# Patient Record
Sex: FEMALE | Race: WHITE | Employment: OTHER | URBAN - METROPOLITAN AREA
[De-identification: names, ages, dates, MRNs, and addresses within clinical notes are randomized per-mention and may not be internally consistent; named-entity substitution may affect disease eponyms.]

---

## 2017-01-03 ENCOUNTER — HOSPITAL ENCOUNTER (OUTPATIENT)
Dept: PHYSICAL THERAPY | Age: 57
Discharge: HOME | End: 2017-01-03
Payer: COMMERCIAL

## 2017-01-03 PROCEDURE — 97110 THERAPEUTIC EXERCISES: CPT

## 2017-01-03 PROCEDURE — 97033 APP MDLTY 1+IONTPHRSIS EA 15: CPT

## 2017-01-03 NOTE — PROGRESS NOTES
PT DAILY TREATMENT NOTE - [facility] 316    Patient Name: Kaye Market  Date:1/3/2017  : 1960  [x]  Patient  Verified  Payor: Addie Salinas / Plan: [address] PPO / Product Type: PPO /    In time: 9:34 Out time:10:18  Total Treatment Time (min): 38  Visit #: 5 of     Treatment Area: Pain in right hip [M25.551]  Pain in left hip [M25.552]    SUBJECTIVE  Pain Level (0-10 scale): 5  Any medication changes, allergies to medications, adverse drug reactions, diagnosis change, or new procedure performed?: [x] No    [] Yes (see summary sheet for update)  Subjective functional status/changes:   [] No changes reported  I  Took  [place]  Things down.     OBJECTIVE    Modality rationale: decrease edema, decrease inflammation, decrease pain and increase tissue extensibility to improve the patients ability to perform ADL    Min Type Additional Details    [] Estim:  []Unatt       []IFC  []Premod                        []Other:  []w/ice   []w/heat  Position:  Location:    [] Estim: []Att    []TENS instruct  []NMES                    []Other:  []w/US   []w/ice   []w/heat  Position:  Location:    []  Traction: [] Cervical       []Lumbar                       [] Prone          []Supine                       []Intermittent   []Continuous Lbs:  [] before manual  [] after manual    []  Ultrasound: []Continuous   [] Pulsed                           []1MHz   []3MHz Location:  W/cm2:   10 [x]  Iontophoresis with dexamethasone         Location: [x] Take home patch   [] In clinic    []  Ice     []  heat  []  Ice massage  []  Laser   []  Anodyne Position:  Location:    []  Laser with stim  []  Other: Position:  Location:    []  Vasopneumatic Device Pressure:       [] lo [] med [] hi   Temperature: [] lo [] med [] hi   [x] Skin assessment post-treatment:  [x]intact []redness- no adverse reaction    []redness - adverse reaction:      min []Eval                  []Re-Eval       28 min Therapeutic Exercise:  [x] See flow sheet : Rationale: increase ROM, increase strength and improve coordination to improve the patients ability to perform ADL        min Therapeutic Activity:  []  See flow sheet :   Rationale:   to improve the patients ability to       min Neuromuscular Re-education:  []  See flow sheet :   Rationale:   to improve the patients ability to      min Manual Therapy:     Rationale: decrease pain, increase ROM, increase tissue extensibility and decrease edema  to perform ADL     min Gait Training:  ___ feet with ___ device on level surfaces with ___ level of assist   Rationale: With   [x] TE   [] TA   [] neuro   [] other: Patient Education: [x] Review HEP    [] Progressed/Changed HEP based on:   [] positioning   [] body mechanics   [] transfers   [] heat/ice application    [] other:      Other Objective/Functional Measures:  Mild  Skin  Irritation  On  (L) hip  FOTO:44    Pain Level (0-10 scale) post treatment: 4-5    ASSESSMENT/Changes in Function: discussed  With pt  On  Indication  Of  Irritation  With ionto. Pt  Requested  To try  It  Again. Fair  Response  To each there ex. Instruct  Pt  To ice  At home. FOTO  Remained  The same. Patient will continue to benefit from skilled PT services to address functional mobility deficits, address ROM deficits, address strength deficits and analyze and address soft tissue restrictions to attain remaining goals. [x]  See Plan of Care  []  See progress note/recertification  []  See Discharge Summary         Progress towards goals / Updated goals:  Short Term Goals:  To be accomplished in 5 treatments:  1 patient will have established and be independent with HEP to aid with progression of skilled PT program  EVAL issued  CURRENT progressing 12/23/16  2 patient Will have Pain R 2 L 1 to aid with increase tolerance to ADLs and activities  EVAL R 4 L 2  CURRENT L 4 R 5 12/27/16  3 patient will have FOTO improved to 50 to show improved function And tolerance to ADLs  EVAL 44  CURRENT  44 1/3/16  Long Term Goals:  To be accomplished in 12-24 treatments:  1 patient Will have Pain R 1 L 2 to aid with increase tolerance to ADLs and activities  EVAL R 4 L 2  CURRENT L 4 R 5 12/27/16  2 patient will have FOTO improved to 59 to show improved function And tolerance to ADLs  EVAL 44  CURRENT  3 patient will report 50% improvement to aid with carryover to resuming her walking program  EVAL UA to tolerate her walking program  CURRENT    PLAN  []  Upgrade activities as tolerated     [x]  Continue plan of care  []  Update interventions per flow sheet       []  Discharge due to:_  []  Other:_      Maria L Carroll, SANDRA 1/3/2017  9:53 AM

## 2017-01-05 ENCOUNTER — HOSPITAL ENCOUNTER (OUTPATIENT)
Dept: PHYSICAL THERAPY | Age: 57
Discharge: HOME | End: 2017-01-05
Payer: COMMERCIAL

## 2017-01-05 PROCEDURE — 97035 APP MDLTY 1+ULTRASOUND EA 15: CPT

## 2017-01-05 PROCEDURE — 97110 THERAPEUTIC EXERCISES: CPT

## 2017-01-05 NOTE — PROGRESS NOTES
PT DAILY TREATMENT NOTE - [facility] 3-16    Patient Name: Pasha Elder  Date:2017  : 1960  [x]  Patient  Verified  Payor: Miguel Angel Cruz / Plan: Roy Ormond PPO / Product Type: PPO /    In time:4:25  Out time:5:14  Total Treatment Time (min): 41  Visit #: 6 of     Treatment Area: Pain in right hip [M25.551]  Pain in left hip [M25.552]    SUBJECTIVE  Pain Level (0-10 scale): 3  (L)  4  (R)  Any medication changes, allergies to medications, adverse drug reactions, diagnosis change, or new procedure performed?: [x] No    [] Yes (see summary sheet for update)  Subjective functional status/changes:   [] No changes reported  Went  Back to work on  [employer]  Part time.     OBJECTIVE    Modality rationale: decrease edema, decrease inflammation, decrease pain and increase tissue extensibility to improve the patients ability to perform ADL   Min Type Additional Details    [] Estim:  []Unatt       []IFC  []Premod                        []Other:  []w/ice   []w/heat  Position:  Location:    [] Estim: []Att    []TENS instruct  []NMES                    []Other:  []w/US   []w/ice   []w/heat  Position:  Location:    []  Traction: [] Cervical       []Lumbar                       [] Prone          []Supine                       []Intermittent   []Continuous Lbs:  [] before manual  [] after manual    [x]  Ultrasound: [x]Continuous   [] Pulsed             10              [x]1MHz   []3MHz Location:(B) hip  W/cm2:1.3    []  Iontophoresis with dexamethasone         Location: [] Take home patch   [] In clinic    []  Ice     []  heat  []  Ice massage  []  Laser   []  Anodyne Position:  Location:    []  Laser with stim  []  Other: Position:  Location:    []  Vasopneumatic Device Pressure:       [] lo [] med [] hi   Temperature: [] lo [] med [] hi   [x] Skin assessment post-treatment:  [x]intact []redness- no adverse reaction    []redness - adverse reaction:      min []Eval                  []Re-Eval       31 min Therapeutic Exercise:  [x] See flow sheet :   Rationale: increase ROM and increase strength to improve the patients ability to perform ADL     min Therapeutic Activity:  []  See flow sheet :   Rationale:   to improve the patients ability to      min Neuromuscular Re-education:  []  See flow sheet :   Rationale:   to improve the patients ability to      min Manual Therapy:     Rationale: decrease pain, increase ROM, increase tissue extensibility and decrease edema  to perform ADL     min Gait Training:  ___ feet with ___ device on level surfaces with ___ level of assist   Rationale: With   [x] TE   [] TA   [] neuro   [] other: Patient Education: [x] Review HEP    [] Progressed/Changed HEP based on:   [] positioning   [] body mechanics   [] transfers   [] heat/ice application    [] other:      Other Objective/Functional Measures:  Increased  Rep  Per flow  sheet    Pain Level (0-10 scale) post treatment: 0    ASSESSMENT/Changes in Function: Completed  Each there ex  Fairly  Well. Benefited  With US. Patient will continue to benefit from skilled PT services to address functional mobility deficits, address ROM deficits, address strength deficits, analyze and address soft tissue restrictions, analyze and cue movement patterns and instruct in home and community integration to attain remaining goals. [x]  See Plan of Care  []  See progress note/recertification  []  See Discharge Summary         Progress towards goals / Updated goals:  Short Term Goals: To be accomplished in 5 treatments:  1 patient will have established and be independent with HEP to aid with progression of skilled PT program  EVAL issued  CURRENT progressing 12/23/16  2 patient Will have Pain R 2 L 1 to aid with increase tolerance to ADLs and activities  EVAL R 4 L 2  CURRENT L 4 R 5 12/27/16  3 patient will have FOTO improved to 50 to show improved function And tolerance to ADLs  EVAL 44  CURRENT 44 1/3/16  Long Term Goals:  To be accomplished in 12-24 treatments:  1 patient Will have Pain R 1 L 2 to aid with increase tolerance to ADLs and activities  EVAL R 4 L 2  CURRENT L 4 R 5 12/27/16  2 patient will have FOTO improved to 59 to show improved function And tolerance to ADLs  EVAL 44  CURRENT  3 patient will report 50% improvement to aid with carryover to resuming her walking program  EVAL UA to tolerate her walking program  CURRENT  PLAN  []  Upgrade activities as tolerated     [x]  Continue plan of care  []  Update interventions per flow sheet       []  Discharge due to:_  []  Other:_      Maria L Carroll, SANDRA 1/5/2017  4:34 PM

## 2017-01-09 ENCOUNTER — APPOINTMENT (OUTPATIENT)
Dept: PHYSICAL THERAPY | Age: 57
End: 2017-01-09
Payer: COMMERCIAL

## 2017-01-10 ENCOUNTER — APPOINTMENT (OUTPATIENT)
Dept: PHYSICAL THERAPY | Age: 57
End: 2017-01-10
Payer: COMMERCIAL

## 2017-01-11 ENCOUNTER — HOSPITAL ENCOUNTER (OUTPATIENT)
Dept: PHYSICAL THERAPY | Age: 57
Discharge: HOME | End: 2017-01-11
Payer: COMMERCIAL

## 2017-01-11 PROCEDURE — 97035 APP MDLTY 1+ULTRASOUND EA 15: CPT

## 2017-01-11 PROCEDURE — 97110 THERAPEUTIC EXERCISES: CPT

## 2017-01-11 NOTE — PROGRESS NOTES
[facility]  [address] [facility] [facility], [address] [address],[place]  [phone] fax    Physician Update  [x] Progress Note  [] Discharge Summary  Patient name: Jazmine Clark Start of Care: 16   Referral source: Aneesh Tillman MD : 1960   Medical/Treatment Diagnosis: Pain in right hip [M25.551]  Pain in left hip [M25.552] Onset Date:May 2016     Prior Hospitalization: see medical history Provider#: [id number]   Medications: Verified on Patient Summary List    Comorbidities:fibromyalgia, depression ,arthritis, thyroid nodules, HTN, similar hip involvement 10 years ago  Prior Level of Function:I all areas of ADLs and acitivities, no AD use today  Visits from Start of Care: 7    Missed Visits: 0    Status at Evaluation/Last Progress Note: initial evaluation and plan of care  Progress towards Goals: Pain B hips 4/10 today (eval R 4 L 2 ), FOTO has remained at 44. She has been compliant and cooperative with her exercises and HEP. She initially was noting benefit from the iontophoresis intervention however developed an irritation to the patch and we discontinued this. We initiated US and she has been noting benefit from this recent change. We are awaiting a prescription to be called in to her pharmacy for the 10% hydrocortisone cream to initiate phonophoresis. Patient demonstrates the potential to make further gains with improving ROM, strength, endurance/activity tolerance, functional FOTO survey score and all within a reasonable time frame so as to further increase their functional independence with ADLs and activities for carryover to  improved sleeping tolerance , tolerance to household chores. Patient requires skilled Physical Therapy so as to monitor their response to and modify their treatment plan accordingly. Patient appears to be an appropriate candidate for skilled outpatient Physical Therapy.     Goals: to be achieved in 5-7 treatments:    ASSESSMENT/RECOMMENDATIONS:  [x]Continue therapy per initial plan/protocol at a frequency of  2-3 x per week for 5-7 treatments from original plan  []Continue therapy with the following recommended changes:_____________________      _____________________________________________________________________  []Discontinue therapy progressing towards or have reached established goals  []Discontinue therapy due to lack of appreciable progress towards goals  []Discontinue therapy due to lack of attendance or compliance  []Await Physician's recommendations/decisions regarding therapy  []Other:________________________________________________________________    Thank you for this referral. Marisabel Campa, PT 1/11/2017 11:11 AM  NOTE TO PHYSICIAN:  PLEASE COMPLETE THE ORDERS BELOW AND   [facility]: [phone]  If you are unable to process this request in 24 hours please contact our office: [phone]    ? I have read the above report and request that my patient continue as recommended. ? I have read the above report and request that my patient continue therapy with the following changes/special instructions:_____________________________________  ? I have read the above report and request that my patient be discharged from therapy.     Physicians signature: __________________________Date: ________Time:________

## 2017-01-11 NOTE — PROGRESS NOTES
PT DAILY TREATMENT NOTE     Patient Name: Vincent Stringer  Date:2017  : 1960  [x]  Patient  Verified  Payor: Alex Fisher / Plan: Elisa Pabon PPO / Product Type: PPO /    In time:1002  Out time:1103  Total Treatment Time (min): 47  Visit #: 7 of     Treatment Area: Pain in right hip [M25.551]  Pain in left hip [M25.552]    SUBJECTIVE  Pain Level (0-10 scale): B 4  Any medication changes, allergies to medications, adverse drug reactions, diagnosis change, or new procedure performed?: [x] No    [] Yes (see summary sheet for update)  Subjective functional status/changes:   [] No changes reported  I really noticed a difference from the US she did.  I am still trying to get the medicated cream.    OBJECTIVE    Modality rationale: decrease edema, decrease inflammation, decrease pain and increase tissue extensibility to improve the patients ability to aid with increase tolerance to ADLs and activities   Min Type Additional Details    [] Estim:  []Unatt       []IFC  []Premod                        []Other:  []w/ice   []w/heat  Position:  Location:    [] Estim: []Att    []TENS instruct  []NMES                    []Other:  []w/US   []w/ice   []w/heat  Position:  Location:    []  Traction: [] Cervical       []Lumbar                       [] Prone          []Supine                       []Intermittent   []Continuous Lbs:  [] before manual  [] after manual   16 [x]  Ultrasound: [x]Continuous   [] Pulsed                           [x]1MHz   []3MHz W/cm2:1.3  Location:B hips, greater trochanteric regions    []  Iontophoresis with dexamethasone         Location: [] Take home patch   [] In clinic    []  Ice     []  heat  []  Ice massage  []  Laser   []  Anodyne Position:  Location:    []  Laser with stim  []  Other:  Position:  Location:    []  Vasopneumatic Device Pressure:       [] lo [] med [] hi   Temperature: [] lo [] med [] hi   [] Skin assessment post-treatment:  []intact []redness- no adverse reaction []redness - adverse reaction:      min []Eval                  []Re-Eval       31 min Therapeutic Exercise:  [x] See flow sheet :   Rationale: increase ROM, increase strength, improve coordination, improve balance and increase proprioception to improve the patients ability to aid with increase tolerance to ADLs and activities     min Therapeutic Activity:  []  See flow sheet :   Rationale:   to improve the patients ability to       min Neuromuscular Re-education:  []  See flow sheet :   Rationale:   to improve the patients ability to      min Manual Therapy:     Rationale:  to      min Gait Training:  ___ feet with ___ device on level surfaces with ___ level of assist   Rationale: With   [] TE   [] TA   [] neuro   [] other: Patient Education: [x] Review HEP    [] Progressed/Changed HEP based on:   [] positioning   [] body mechanics   [] transfers   [] heat/ice application    [] other:      Other Objective/Functional Measures:VC exercises and stretches    Pain Level (0-10 scale) post treatment: 4    ASSESSMENT/Changes in Function: patient notes benefit from [address] last session to B hips. Patient will continue to benefit from skilled PT services to modify and progress therapeutic interventions, address functional mobility deficits, address ROM deficits, address strength deficits, analyze and address soft tissue restrictions, analyze and cue movement patterns, analyze and modify body mechanics/ergonomics, assess and modify postural abnormalities and instruct in home and community integration to attain remaining goals. [x]  See Plan of Care  []  See progress note/recertification  []  See Discharge Summary         Progress towards goals / Updated goals:  Short Term Goals:  To be accomplished in 5 treatments:  1 patient will have established and be independent with HEP to aid with progression of skilled PT program  EVAL issued  CURRENT progressing 12/23/16, met 1/11/17  2 patient Will have Pain R 2 L 1 to aid with increase tolerance to ADLs and activities  EVAL R 4 L 2  CURRENT B hips 4/10 1/11/17  3 patient will have FOTO improved to 50 to show improved function And tolerance to ADLs  EVAL 44  CURRENT 44 1/3/16  Long Term Goals:  To be accomplished in 12-24 treatments:  1 patient Will have Pain R 1 L 2 to aid with increase tolerance to ADLs and activities  EVAL R 4 L 2  CURRENT B hips 4 1/11/17  2 patient will have FOTO improved to 59 to show improved function And tolerance to ADLs  EVAL 44  CURRENT 44   1/3/17  3 patient will report 50% improvement to aid with carryover to resuming her walking program  EVAL UA to tolerate her walking program  CURRENT    PLAN  [x]  Upgrade activities as tolerated     [x]  Continue plan of care  []  Update interventions per flow sheet       []  Discharge due to:_  []  Other:_      Jeronimo Henry, PT 1/11/2017  10:09 AM    Future Appointments  Date Time Provider Lian Bustamante   1/11/2017 10:30 AM Jeronimo Henry PT [facility]   1/17/2017 9:30 AM Wiliam Shore PTA [facility]   1/19/2017 4:30 PM Maria L Carroll PTA [facility]   10/16/2017 8:30 AM Jordana Valencia MD [facility]

## 2017-01-12 ENCOUNTER — APPOINTMENT (OUTPATIENT)
Dept: PHYSICAL THERAPY | Age: 57
End: 2017-01-12
Payer: COMMERCIAL

## 2017-01-17 ENCOUNTER — HOSPITAL ENCOUNTER (OUTPATIENT)
Dept: PHYSICAL THERAPY | Age: 57
Discharge: HOME | End: 2017-01-17
Payer: COMMERCIAL

## 2017-01-17 PROCEDURE — 97035 APP MDLTY 1+ULTRASOUND EA 15: CPT

## 2017-01-17 PROCEDURE — 97110 THERAPEUTIC EXERCISES: CPT

## 2017-01-17 NOTE — PROGRESS NOTES
PT DAILY TREATMENT NOTE - [facility] 3-16    Patient Name: Heydi Mclean  Date:2017  : 1960  [x]  Patient  Verified  Payor: Yessi Life / Plan: DOROTHYJORDAN JOSIANERENATO PPO / Product Type: PPO /    In time:9:28  Out time:10:12  Total Treatment Time (min):33  Visit #: 8 of     Treatment Area: Pain in right hip [M25.551]  Pain in left hip [M25.552]    SUBJECTIVE  Pain Level (0-10 scale): 3  Any medication changes, allergies to medications, adverse drug reactions, diagnosis change, or new procedure performed?: [x] No    [] Yes (see summary sheet for update)  Subjective functional status/changes:   [] No changes reported  It  Ached  A lot  Ion yesterday  Due  To  That I was on  My  Feet  For  ~ 5 hours  Cooking.     OBJECTIVE    Modality rationale: decrease edema, decrease inflammation, decrease pain and increase tissue extensibility to improve the patients ability to perform ADL   Min Type Additional Details    [] Estim:  []Unatt       []IFC  []Premod                        []Other:  []w/ice   []w/heat  Position:  Location:    [] Estim: []Att    []TENS instruct  []NMES                    []Other:  []w/US   []w/ice   []w/heat  Position:  Location:    []  Traction: [] Cervical       []Lumbar                       [] Prone          []Supine                       []Intermittent   []Continuous Lbs:  [] before manual  [] after manual   1 [x]  Ultrasound: [x]Continuous   [] Pulsed       10     [employer]ono               [x]1MHz   []3MHz Location: (B) hip  W/cm2:1.3    []  Iontophoresis with dexamethasone         Location: [] Take home patch   [] In clinic    []  Ice     []  heat  []  Ice massage  []  Laser   []  Anodyne Position:  Location:    []  Laser with stim  []  Other: Position:  Location:    []  Vasopneumatic Device Pressure:       [] lo [] med [] hi   Temperature: [] lo [] med [] hi   [x] Skin assessment post-treatment:  [x]intact []redness- no adverse reaction    []redness - adverse reaction:      min []Eval []Re-Eval       23 min Therapeutic Exercise:  [x] See flow sheet :   Rationale: increase ROM and increase strength to improve the patients ability to perform ADL     min Therapeutic Activity:  []  See flow sheet :   Rationale:   to improve the patients ability to       min Neuromuscular Re-education:  []  See flow sheet :   Rationale:   to improve the patients ability to      min Manual Therapy:     Rationale: decrease pain, increase ROM, increase tissue extensibility and decrease edema  to perform ADL      min Gait Training:  ___ feet with ___ device on level surfaces with ___ level of assist   Rationale: With   [x] TE   [] TA   [] neuro   [] other: Patient Education: [x] Review HEP    [] Progressed/Changed HEP based on:   [] positioning   [] body mechanics   [] transfers   [] heat/ice application    [] other:      Other Objective/Functional Measures:  Increased  Seconds  For  stretches    Pain Level (0-10 scale) post treatment: <3    ASSESSMENT/Changes in Function: Responded  Fairly  Well  To each there ex. Patient will continue to benefit from skilled PT services to address functional mobility deficits, address ROM deficits, address strength deficits, analyze and address soft tissue restrictions, analyze and cue movement patterns and instruct in home and community integration to attain remaining goals. [x]  See Plan of Care  []  See progress note/recertification  []  See Discharge Summary         Progress towards goals / Updated goals:  Short Term Goals:  To be accomplished in 5 treatments:  1 patient will have established and be independent with HEP to aid with progression of skilled PT program  EVAL issued  CURRENT progressing 12/23/16, met 1/11/17  2 patient Will have Pain R 2 L 1 to aid with increase tolerance to ADLs and activities  EVAL R 4 L 2  CURRENT B hips 4/10 1/11/17  3 patient will have FOTO improved to 50 to show improved function And tolerance to ADLs  EVAL 44  CURRENT 44 1/3/16  Long Term Goals:  To be accomplished in 12-24 treatments:  1 patient Will have Pain R 1 L 2 to aid with increase tolerance to ADLs and activities  EVAL R 4 L 2  CURRENT B hips 4 1/11/17  2 patient will have FOTO improved to 59 to show improved function And tolerance to ADLs  EVAL 44  CURRENT 44 1/3/17  3 patient will report 50% improvement to aid with carryover to resuming her walking program  EVAL UA to tolerate her walking program  CURRENT    PLAN  []  Upgrade activities as tolerated     [x]  Continue plan of care  []  Update interventions per flow sheet       []  Discharge due to:_  []  Other:_      Maria L Carroll, SANDRA 1/17/2017  9:28 AM

## 2017-01-19 ENCOUNTER — HOSPITAL ENCOUNTER (OUTPATIENT)
Dept: PHYSICAL THERAPY | Age: 57
Discharge: HOME | End: 2017-01-19
Payer: COMMERCIAL

## 2017-01-19 PROCEDURE — 97110 THERAPEUTIC EXERCISES: CPT

## 2017-01-19 PROCEDURE — 97035 APP MDLTY 1+ULTRASOUND EA 15: CPT

## 2017-01-19 NOTE — PROGRESS NOTES
PT DAILY TREATMENT NOTE - [facility] 3-16    Patient Name: Heydi Mclean  Date:2017  : 1960  [x]  Patient  Verified  Payor: Yessi Life / Plan: [employer] PPO / Product Type: PPO /    In time:4:26  Out time:5:16  Total Treatment Time (min): 45  Visit #: 9 of     Treatment Area: Pain in right hip [M25.551]  Pain in left hip [M25.552]    SUBJECTIVE  Pain Level (0-10 scale): 3  Any medication changes, allergies to medications, adverse drug reactions, diagnosis change, or new procedure performed?: [x] No    [] Yes (see summary sheet for update)  Subjective functional status/changes:   [] No changes reported  Feeling  Better.     OBJECTIVE    Modality rationale: decrease edema, decrease inflammation, decrease pain and increase tissue extensibility to improve the patients ability to perform ADL   Min Type Additional Details    [] Estim:  []Unatt       []IFC  []Premod                        []Other:  []w/ice   []w/heat  Position:  Location:    [] Estim: []Att    []TENS instruct  []NMES                    []Other:  []w/US   []w/ice   []w/heat  Position:  Location:    []  Traction: [] Cervical       []Lumbar                       [] Prone          []Supine                       []Intermittent   []Continuous Lbs:  [] before manual  [] after manual    [x]  Ultrasound: [x]Continuous   [] Pulsed       10        phono            [x]1MHz   []3MHz Location:(B) hip  W/cm2:1.3    []  Iontophoresis with dexamethasone         Location: [] Take home patch   [] In clinic    []  Ice     []  heat  []  Ice massage  []  Laser   []  Anodyne Position:  Location:    []  Laser with stim  []  Other: Position:  Location:    []  Vasopneumatic Device Pressure:       [] lo [] med [] hi   Temperature: [] lo [] med [] hi   [x] Skin assessment post-treatment:  [x]intact []redness- no adverse reaction    []redness - adverse reaction:      min []Eval                  []Re-Eval       35 min Therapeutic Exercise:  [x] See flow sheet : Rationale: increase ROM and increase strength to improve the patients ability to perform ADL     min Therapeutic Activity:  []  See flow sheet :   Rationale:   to improve the patients ability to       min Neuromuscular Re-education:  []  See flow sheet :   Rationale:   to improve the patients ability to      min Manual Therapy:     Rationale:  to      min Gait Training:  ___ feet with ___ device on level surfaces with ___ level of assist   Rationale: With   [x] TE   [] TA   [] neuro   [] other: Patient Education: [x] Review HEP    [] Progressed/Changed HEP based on:   [] positioning   [] body mechanics   [] transfers   [] heat/ice application    [] other:      Other Objective/Functional Measures:  Completed  Each there ex  Fairly  Well. Pain Level (0-10 scale) post treatment: 0    ASSESSMENT/Changes in Function: Benefited  With treatment. Patient will continue to benefit from skilled PT services to address functional mobility deficits, address ROM deficits, address strength deficits, analyze and address soft tissue restrictions and instruct in home and community integration to attain remaining goals. [x]  See Plan of Care  []  See progress note/recertification  []  See Discharge Summary         Progress towards goals / Updated goals:  Short Term Goals: To be accomplished in 5 treatments:  1 patient will have established and be independent with HEP to aid with progression of skilled PT program  EVAL issued  CURRENT progressing 12/23/16, met 1/11/17  2 patient Will have Pain R 2 L 1 to aid with increase tolerance to ADLs and activities  EVAL R 4 L 2  CURRENT B hips 4/10 1/11/17  3 patient will have FOTO improved to 50 to show improved function And tolerance to ADLs  EVAL 44  CURRENT 44 1/3/16  Long Term Goals:  To be accomplished in 12-24 treatments:  1 patient Will have Pain R 1 L 2 to aid with increase tolerance to ADLs and activities  EVAL R 4 L 2  CURRENT B hips 4 1/11/17  2 patient will have FOTO improved to 59 to show improved function And tolerance to ADLs  EVAL 44  CURRENT 44 1/3/17  3 patient will report 50% improvement to aid with carryover to resuming her walking program  EVAL UA to tolerate her walking program  CURRENT       PLAN  []  Upgrade activities as tolerated     [x]  Continue plan of care  []  Update interventions per flow sheet       []  Discharge due to:_  [x]  Other:_ REASSESS GOALS  MD NOTE  BRIAN Carroll, PTA 1/19/2017  4:59 PM

## 2017-01-23 ENCOUNTER — HOSPITAL ENCOUNTER (OUTPATIENT)
Dept: PHYSICAL THERAPY | Age: 57
Discharge: HOME | End: 2017-01-23
Payer: COMMERCIAL

## 2017-01-23 PROCEDURE — 97110 THERAPEUTIC EXERCISES: CPT

## 2017-01-23 PROCEDURE — 97035 APP MDLTY 1+ULTRASOUND EA 15: CPT

## 2017-01-23 NOTE — PROGRESS NOTES
PT DAILY TREATMENT NOTE     Patient Name: Pasha Elder  Date:2017  : 1960  [x]  Patient  Verified  Payor: Miguel Angel Cruz / Plan: Roy Ormond PPO / Product Type: PPO /    In time:10:06  Out time:11:02  Total Treatment Time (min): 64  Visit #: 10 of     Treatment Area: Pain in right hip [M25.551]  Pain in left hip [M25.552]    SUBJECTIVE  Pain Level (0-10 scale): R-3,L-1  Any medication changes, allergies to medications, adverse drug reactions, diagnosis change, or new procedure performed?: [x] No    [] Yes (see summary sheet for update)  Subjective functional status/changes:   [] No changes reported  This will be the 3rd treatment with the phono. OBJECTIVE    Modality rationale: decrease edema and decrease inflammation to improve the patients ability to improve pts ability to sit for long flights as she enjoys travelling.    Min Type Additional Details    [] Estim:  []Unatt       []IFC  []Premod                        []Other:  []w/ice   []w/heat  Position:  Location:    [] Estim: []Att    []TENS instruct  []NMES                    []Other:  []w/US   []w/ice   []w/heat  Position:  Location:    []  Traction: [] Cervical       []Lumbar                       [] Prone          []Supine                       []Intermittent   []Continuous Lbs:  [] before manual  [] after manual   10 [x]  Ultrasound: [x]Continuous   [] Pulsed                           []1MHz   [x]3MHz W/cm2:1.0  Location:B hips    []  Iontophoresis with dexamethasone         Location: [] Take home patch   [] In clinic    []  Ice     []  heat  []  Ice massage  []  Laser   []  Anodyne Position:  Location:    []  Laser with stim  []  Other:  Position:  Location:    []  Vasopneumatic Device Pressure:       [] lo [] med [] hi   Temperature: [] lo [] med [] hi   [x] Skin assessment post-treatment:  [x]intact [x]redness- no adverse reaction    []redness - adverse reaction:       46 min Therapeutic Exercise:  [] See flow sheet :   Rationale: increase ROM and increase strength to improve the patients ability to return to normal daily activities. With   [x] TE   [] TA   [] neuro   [] other: Patient Education: [x] Review HEP    [x] Progressed/Changed HEP based on:   [] positioning   [] body mechanics   [] transfers   [] heat/ice application    [] other:      Other Objective/Functional Measures: Improvement noted during the phono treatment with decreased pain and after treatment pt reports improved ROM. Pain Level (0-10 scale) post treatment: 0/10    ASSESSMENT/Changes in Function: Pt to continue with current POC to achieve current goals. Patient will continue to benefit from skilled PT services to address functional mobility deficits to attain remaining goals. [x]  See Plan of Care  []  See progress note/recertification  []  See Discharge Summary         Progress towards goals / Updated goals:  Short Term Goals: To be accomplished in 5 treatments:  1 patient will have established and be independent with HEP to aid with progression of skilled PT program  EVAL issued  CURRENT progressing 12/23/16, met 1/11/17  2 patient Will have Pain R 2 L 1 to aid with increase tolerance to ADLs and activities  EVAL R 4 L 2  CURRENT B hips 4/10 1/11/17  3 patient will have FOTO improved to 50 to show improved function And tolerance to ADLs  EVAL 44  CURRENT 44 1/3/16  Long Term Goals:  To be accomplished in 12-24 treatments:  1 patient Will have Pain R 1 L 2 to aid with increase tolerance to ADLs and activities  EVAL R 4 L 2  CURRENT B hips 4 1/11/17  2 patient will have FOTO improved to 59 to show improved function And tolerance to ADLs  EVAL 44  CURRENT 44 1/3/17  3 patient will report 50% improvement to aid with carryover to resuming her walking program  EVAL UA to tolerate her walking program  CURRENT    PLAN  [x]  Upgrade activities as tolerated     []  Continue plan of care  []  Update interventions per flow sheet       []  Discharge due to:_  [] Other:_      Nayeli Sharp 1/23/2017  11:44 AM    Future Appointments  Date Time Provider Lian Vegaisti   1/25/2017 10:30 AM Rosalind Edmond [facility]   1/30/2017 9:30 AM Chun Ramos PT [facility]   2/1/2017 4:30 PM Maria L Carroll PTA [facility]   10/16/2017 8:30 AM Robby Okeefe MD [facility]

## 2017-01-25 ENCOUNTER — HOSPITAL ENCOUNTER (OUTPATIENT)
Dept: PHYSICAL THERAPY | Age: 57
Discharge: HOME | End: 2017-01-25
Payer: COMMERCIAL

## 2017-01-25 PROCEDURE — 97035 APP MDLTY 1+ULTRASOUND EA 15: CPT

## 2017-01-25 PROCEDURE — 97110 THERAPEUTIC EXERCISES: CPT

## 2017-01-25 NOTE — PROGRESS NOTES
PT DAILY TREATMENT NOTE - [facility] 3-16    Patient Name: Vincent Stringer  Date:2017  : 1960  [x]  Patient  Verified  Payor: Alex Fisher / Plan: Elisa Pabon PPO / Product Type: PPO /    In time:10:31  Out time:11:22  Total Treatment Time (min): 41  Visit #: 11 of -    Treatment Area: Pain in right hip [M25.551]  Pain in left hip [M25.552]    SUBJECTIVE  Pain Level (0-10 scale): (L)  1  (R)  2  Any medication changes, allergies to medications, adverse drug reactions, diagnosis change, or new procedure performed?: [x] No    [] Yes (see summary sheet for update)  Subjective functional status/changes:   [] No changes reported  Getting  Better.     OBJECTIVE    Modality rationale: decrease edema, decrease inflammation, decrease pain and increase tissue extensibility to improve the patients ability to perform  ADL   Min Type Additional Details    [] Estim:  []Unatt       []IFC  []Premod                        []Other:  []w/ice   []w/heat  Position:  Location:    [] Estim: []Att    []TENS instruct  []NMES                    []Other:  []w/US   []w/ice   []w/heat  Position:  Location:    []  Traction: [] Cervical       []Lumbar                       [] Prone          []Supine                       []Intermittent   []Continuous Lbs:  [] before manual  [] after manual    [x]  Ultrasound: [x]Continuous   [] Pulsed         10      PHONO            [x]1MHz   []3MHz Location: (B)  hip  W/cm2:1.3    []  Iontophoresis with dexamethasone         Location: [] Take home patch   [] In clinic    []  Ice     []  heat  []  Ice massage  []  Laser   []  Anodyne Position:  Location:    []  Laser with stim  []  Other: Position:  Location:    []  Vasopneumatic Device Pressure:       [] lo [] med [] hi   Temperature: [] lo [] med [] hi   [x] Skin assessment post-treatment:  [x]intact []redness- no adverse reaction    []redness - adverse reaction:      min []Eval                  []Re-Eval       31 min Therapeutic Exercise:  [x] See flow sheet :   Rationale: increase ROM and increase strength to improve the patients ability to perform ADL     min Therapeutic Activity:  []  See flow sheet :   Rationale:   to improve the patients ability to       min Neuromuscular Re-education:  []  See flow sheet :   Rationale:   to improve the patients ability to     min Manual Therapy:     Rationale:  to      min Gait Training:  ___ feet with ___ device on level surfaces with ___ level of assist   Rationale: With   [x] TE   [] TA   [] neuro   [] other: Patient Education: [x] Review HEP    [] Progressed/Changed HEP based on:   [] positioning   [] body mechanics   [] transfers   [] heat/ice application    [] other:      Other Objective/Functional Measures:  Completed  Each there ex  Fairly  Well. Pain Level (0-10 scale) post treatment: 0    ASSESSMENT/Changes in Function: Benefited  With treatment  Today. Patient will continue to benefit from skilled PT services to address functional mobility deficits, address ROM deficits, address strength deficits, analyze and address soft tissue restrictions and instruct in home and community integration to attain remaining goals. [x]  See Plan of Care  []  See progress note/recertification  []  See Discharge Summary         Progress towards goals / Updated goals:  Short Term Goals: To be accomplished in 5 treatments:  1 patient will have established and be independent with HEP to aid with progression of skilled PT program  EVAL issued  CURRENT progressing 12/23/16, met 1/11/17  2 patient Will have Pain R 2 L 1 to aid with increase tolerance to ADLs and activities  EVAL R 4 L 2  CURRENT B hips 4/10 1/11/17  3 patient will have FOTO improved to 50 to show improved function And tolerance to ADLs  EVAL 44  CURRENT 44 1/3/16  Long Term Goals:  To be accomplished in 12-24 treatments:  1 patient Will have Pain R 1 L 2 to aid with increase tolerance to ADLs and activities  EVAL R 4 L 2  CURRENT B hips 4 1/11/17  2 patient will have FOTO improved to 59 to show improved function And tolerance to ADLs  EVAL 44  CURRENT 44 1/3/17  3 patient will report 50% improvement to aid with carryover to resuming her walking program  EVAL UA to tolerate her walking program  CURRENT       PLAN  []  Upgrade activities as tolerated     [x]  Continue plan of care  []  Update interventions per flow sheet       []  Discharge due to:_  [x]  Other:_ Trial  TM  BRIAN Carroll, SANDRA 1/25/2017  11:02 AM

## 2017-01-30 ENCOUNTER — APPOINTMENT (OUTPATIENT)
Dept: PHYSICAL THERAPY | Age: 57
End: 2017-01-30
Payer: COMMERCIAL

## 2017-02-01 ENCOUNTER — APPOINTMENT (OUTPATIENT)
Dept: PHYSICAL THERAPY | Age: 57
End: 2017-02-01

## 2017-03-27 RX ORDER — ESCITALOPRAM OXALATE 20 MG/1
TABLET ORAL
Qty: 90 TAB | Refills: 3 | Status: SHIPPED | OUTPATIENT
Start: 2017-03-27 | End: 2017-03-28 | Stop reason: SDUPTHER

## 2017-03-29 RX ORDER — ESCITALOPRAM OXALATE 20 MG/1
TABLET ORAL
Qty: 90 TAB | Refills: 3 | Status: SHIPPED | OUTPATIENT
Start: 2017-03-29 | End: 2018-03-17 | Stop reason: SDUPTHER

## 2017-05-02 RX ORDER — CYCLOBENZAPRINE HCL 10 MG
TABLET ORAL
Qty: 270 TAB | Refills: 3 | Status: SHIPPED | OUTPATIENT
Start: 2017-05-02 | End: 2019-02-25

## 2017-06-28 ENCOUNTER — APPOINTMENT (OUTPATIENT)
Dept: GENERAL RADIOLOGY | Age: 57
End: 2017-06-28
Attending: EMERGENCY MEDICINE
Payer: COMMERCIAL

## 2017-06-28 ENCOUNTER — HOSPITAL ENCOUNTER (EMERGENCY)
Age: 57
Discharge: HOME | End: 2017-06-28
Attending: EMERGENCY MEDICINE
Payer: COMMERCIAL

## 2017-06-28 VITALS
OXYGEN SATURATION: 95 % | BODY MASS INDEX: 28.47 KG/M2 | HEIGHT: 60 IN | WEIGHT: 145 LBS | TEMPERATURE: 98.2 F | SYSTOLIC BLOOD PRESSURE: 115 MMHG | HEART RATE: 96 BPM | DIASTOLIC BLOOD PRESSURE: 73 MMHG | RESPIRATION RATE: 16 BRPM

## 2017-06-28 DIAGNOSIS — S93.401A SPRAIN OF RIGHT ANKLE, UNSPECIFIED LIGAMENT, INITIAL ENCOUNTER: Primary | ICD-10-CM

## 2017-06-28 PROCEDURE — 73610 X-RAY EXAM OF ANKLE: CPT

## 2017-06-28 PROCEDURE — 99283 EMERGENCY DEPT VISIT LOW MDM: CPT

## 2017-06-28 NOTE — ED PROVIDER NOTES
HPI 61 YOF here for traumatic right ankle injury. She says about 30 min ago, she twisted her right ankle walking down steps. She denies numbness, tingling, or other complaints.       Past Medical History:   Diagnosis Date    Anxiety 4/14    KIMBERLEY-7 was 19/21    Calculus of kidney 1980s    Depression     has tried paxil, zoloft, wellbutrin, cymbalta, trazodone    Dyslipidemia     calculated 10 year risk score was 2.5% (4/15)    Endometriosis     Fibromyalgia     Dr. Radha Mohan in past    GERD (gastroesophageal reflux disease)     Hypertension     Multiple thyroid nodules 7/09    neg FNA Dr. Demarcus Early Osteopenia     DEXA t score -0.8 spine, -1.6 hip (10/07);  -0.4 spine, -1.1 hip w FRAX 5.2/0.5 (10/12); -1.1 spine, -1.0 hip (4/15); neg w/u secondary causes    Overweight (BMI 25.0-29.9)     peak weight 149 lbs, bmi 29.1 rom 9/16; failed qsymia; 24h U [place] elev but LDST neg 9/16    Rosacea     Seasonal allergic rhinitis     Trochanteric bursitis     Vertical diplopia 1/14    Vitamin D deficiency        Past Surgical History:   Procedure Laterality Date    COLONOSCOPY N/A 6/1/2016    COLONOSCOPY performed by Aracelis Leon MD at [facility] EGD  2005    negative Dr. Franc Salmeron, biopsy neg for sprue also    HX APPENDECTOMY      HX CHOLECYSTECTOMY  9/15    Dr Jaxon Salmeron 2005; Dr Kian Viveros 2016 negative   [place] Davila 2003    HX HEENT  2/14    MRI negative    HX SEPTOPLASTY      HX JENNY AND BSO      NEUROLOGICAL PROCEDURE UNLISTED  6/14    MRA neck neg, MRA brain negative    NEUROLOGICAL PROCEDURE UNLISTED  6/14    MRI brain neg outside scattered minimal wm change         Family History:   Problem Relation Age of Onset    Cancer Mother     Asthma Mother     Thyroid Disease Mother     Elevated Lipids Mother     Hypertension Father     Alcohol abuse Brother        Social History     Social History    Marital status:      Spouse name: N/A   [address] Number of children: 1    Years of education: N/A     Occupational History    ophth [employer]      Social History Main Topics    Smoking status: Never Smoker    Smokeless tobacco: Never Used    Alcohol use Yes      Comment: occasionally    Drug use: No    Sexual activity: Not on file     Other Topics Concern    Not on file     Social History Narrative         ALLERGIES: Augmentin [amoxicillin-pot clavulanate]; Avelox [moxifloxacin]; Cymbalta [duloxetine]; Elavil; Lexapro [escitalopram]; Lyrica [pregabalin]; Paxil [paroxetine hcl]; Pcn [penicillins]; and Trazodone    Review of Systems   Constitutional: Negative. HENT: Negative. Eyes: Negative. Respiratory: Negative. Gastrointestinal: Negative. Genitourinary: Negative for flank pain. Musculoskeletal: Positive for joint swelling. Negative for back pain, neck pain and neck stiffness. Skin: Negative. Neurological: Negative. Psychiatric/Behavioral: Negative for confusion. All other systems reviewed and are negative. Vitals:    06/28/17 1342   BP: 115/73   Pulse: 96   Resp: 16   Temp: 98.2 °F (36.8 °C)   SpO2: 95%   Weight: 65.8 kg (145 lb)   Height: 5' (1.524 m)            Physical Exam   Constitutional: She is oriented to person, place, and time. She appears well-developed and well-nourished. No distress. HENT:   Head: Normocephalic and atraumatic. Right Ear: External ear normal.   Left Ear: External ear normal.   Nose: Nose normal.   Mouth/Throat: Oropharynx is clear and moist.   Eyes: Conjunctivae and EOM are normal. Pupils are equal, round, and reactive to light. Neck: Normal range of motion. Neck supple. Cardiovascular: Normal rate, regular rhythm, normal heart sounds and intact distal pulses. Pulmonary/Chest: Effort normal and breath sounds normal.   Musculoskeletal: Normal range of motion. She exhibits edema and tenderness. Moderate swelling and tender lateral right ankle, NROM, no proximal limb tenderness. Neurological: She is alert and oriented to person, place, and time. Skin: Skin is warm and dry. No rash noted. She is not diaphoretic. No erythema. No pallor. Psychiatric: She has a normal mood and affect. Her behavior is normal.   Nursing note and vitals reviewed. MDM  Number of Diagnoses or Management Options     Amount and/or Complexity of Data Reviewed  Tests in the radiology section of CPT®: ordered and reviewed (No fx. )    Risk of Complications, Morbidity, and/or Mortality  Presenting problems: low  Diagnostic procedures: low  Management options: low  General comments: Ace wrap and d/c to home. Patient Progress  Patient progress: stable    ED Course       Procedures        ICD-10-CM ICD-9-CM   1. Sprain of right ankle, unspecified ligament, initial encounter S93.401A 845.00     Plan: Discharge to home stable, rest, ice, elevate otc tylenol/motrin, see pcp in 2 days, return here for any concerns.

## 2017-06-28 NOTE — ED TRIAGE NOTES
Patient states injury to right ankle approximately 30 minutes ago. Patient noted to have right lateral ankle swelling.

## 2017-06-28 NOTE — ED NOTES
Kelsea Roland is a 61 y.o. female that was discharged in stable. Pt was accompanied by self. Pt is driving. The patients diagnosis, condition and treatment were explained to  patient and aftercare instructions were given. The patient verbalized understanding. Patient armband removed and shredded.

## 2017-07-03 RX ORDER — ERGOCALCIFEROL 1.25 MG/1
50000 CAPSULE ORAL
Qty: 13 CAP | Refills: 3 | Status: SHIPPED | OUTPATIENT
Start: 2017-07-03 | End: 2019-07-17

## 2017-07-27 DIAGNOSIS — R60.9 EDEMA, UNSPECIFIED TYPE: ICD-10-CM

## 2017-07-28 RX ORDER — POTASSIUM CHLORIDE 750 MG/1
10 TABLET, EXTENDED RELEASE ORAL DAILY
Qty: 90 TAB | Refills: 3 | Status: SHIPPED | OUTPATIENT
Start: 2017-07-28 | End: 2019-02-25

## 2017-07-28 RX ORDER — FUROSEMIDE 20 MG/1
TABLET ORAL
Qty: 90 TAB | Refills: 3 | Status: SHIPPED | OUTPATIENT
Start: 2017-07-28 | End: 2019-02-25

## 2017-07-28 NOTE — TELEPHONE ENCOUNTER
From: Zulema Beach  To: Nadine Chavis MD  Sent: 7/27/2017 6:13 PM EDT  Subject: Medication Renewal Request    Original authorizing provider: MD Zulema Haines would like a refill of the following medications:  furosemide (LASIX) 20 mg tablet Nadine Chavis MD]  potassium chloride (K-DUR, KLOR-CON) 10 mEq tablet Nadine Chavis MD]    [address]    Comment:  Please send refills for Furosemide 20 mg and KLOR-CON to [facility] order with 90 days each

## 2017-09-05 DIAGNOSIS — K21.9 GASTROESOPHAGEAL REFLUX DISEASE WITHOUT ESOPHAGITIS: ICD-10-CM

## 2017-09-05 RX ORDER — PANTOPRAZOLE SODIUM 40 MG/1
40 TABLET, DELAYED RELEASE ORAL DAILY
Qty: 90 TAB | Refills: 3 | Status: SHIPPED | OUTPATIENT
Start: 2017-09-05 | End: 2021-12-07 | Stop reason: SDUPTHER

## 2017-09-18 PROBLEM — M35.3 POLYMYALGIA RHEUMATICA (HCC): Status: ACTIVE | Noted: 2017-09-18

## 2017-09-27 ENCOUNTER — TELEPHONE (OUTPATIENT)
Dept: INTERNAL MEDICINE CLINIC | Age: 57
End: 2017-09-27

## 2017-09-27 DIAGNOSIS — Z00.00 ROUTINE GENERAL MEDICAL EXAMINATION AT A HEALTH CARE FACILITY: Primary | ICD-10-CM

## 2017-09-27 DIAGNOSIS — I10 ESSENTIAL HYPERTENSION: ICD-10-CM

## 2017-09-27 DIAGNOSIS — Z11.59 NEED FOR HEPATITIS C SCREENING TEST: ICD-10-CM

## 2017-09-27 DIAGNOSIS — E78.5 DYSLIPIDEMIA: ICD-10-CM

## 2017-09-27 NOTE — TELEPHONE ENCOUNTER
----- Message from Domo Lorenzo sent at 9/27/2017 10:53 AM EDT -----  Regarding: Non-Urgent Medical Question  Contact: [phone]  Please order my labs to be performed at \[facility]\"" before my appt with Dr. David Ko on Nov 13th.   I'd also like the Hep-C test if possible :)  Thanks

## 2017-10-28 ENCOUNTER — HOSPITAL ENCOUNTER (OUTPATIENT)
Dept: LAB | Age: 57
Discharge: HOME | End: 2017-10-28

## 2017-10-28 PROCEDURE — 99001 SPECIMEN HANDLING PT-LAB: CPT | Performed by: INTERNAL MEDICINE

## 2017-10-29 LAB
ALBUMIN SERPL-MCNC: 4 G/DL (ref 3.5–5.5)
ALBUMIN/GLOB SERPL: 1.5 {RATIO} (ref 1.2–2.2)
ALP SERPL-CCNC: 72 IU/L (ref 39–117)
ALT SERPL-CCNC: 25 IU/L (ref 0–32)
AST SERPL-CCNC: 22 IU/L (ref 0–40)
BASOPHILS # BLD AUTO: 0.1 X10E3/UL (ref 0–0.2)
BASOPHILS NFR BLD AUTO: 1 %
BILIRUB SERPL-MCNC: <0.2 MG/DL (ref 0–1.2)
BUN SERPL-MCNC: 22 MG/DL (ref 6–24)
BUN/CREAT SERPL: 25 (ref 9–23)
CALCIUM SERPL-MCNC: 8.9 MG/DL (ref 8.7–10.2)
CHLORIDE SERPL-SCNC: 101 MMOL/L (ref 96–106)
CHOLEST SERPL-MCNC: 200 MG/DL (ref 100–199)
CO2 SERPL-SCNC: 25 MMOL/L (ref 18–29)
CREAT SERPL-MCNC: 0.88 MG/DL (ref 0.57–1)
EOSINOPHIL # BLD AUTO: 0.2 X10E3/UL (ref 0–0.4)
EOSINOPHIL NFR BLD AUTO: 3 %
ERYTHROCYTE [DISTWIDTH] IN BLOOD BY AUTOMATED COUNT: 13.8 % (ref 12.3–15.4)
GFR SERPLBLD CREATININE-BSD FMLA CKD-EPI: 73 ML/MIN/1.73
GFR SERPLBLD CREATININE-BSD FMLA CKD-EPI: 84 ML/MIN/1.73
GLOBULIN SER CALC-MCNC: 2.7 G/DL (ref 1.5–4.5)
GLUCOSE SERPL-MCNC: 92 MG/DL (ref 65–99)
HCT VFR BLD AUTO: 40.1 % (ref 34–46.6)
HCV AB S/CO SERPL IA: <0.1 S/CO RATIO (ref 0–0.9)
HDLC SERPL-MCNC: 61 MG/DL
HGB BLD-MCNC: 13.3 G/DL (ref 11.1–15.9)
IMM GRANULOCYTES # BLD: 0 X10E3/UL (ref 0–0.1)
IMM GRANULOCYTES NFR BLD: 0 %
INTERPRETATION, 910389: NORMAL
LDLC SERPL CALC-MCNC: 104 MG/DL (ref 0–99)
LYMPHOCYTES # BLD AUTO: 1.5 X10E3/UL (ref 0.7–3.1)
LYMPHOCYTES NFR BLD AUTO: 23 %
MCH RBC QN AUTO: 31.1 PG (ref 26.6–33)
MCHC RBC AUTO-ENTMCNC: 33.2 G/DL (ref 31.5–35.7)
MCV RBC AUTO: 94 FL (ref 79–97)
MONOCYTES # BLD AUTO: 0.6 X10E3/UL (ref 0.1–0.9)
MONOCYTES NFR BLD AUTO: 9 %
NEUTROPHILS # BLD AUTO: 4.2 X10E3/UL (ref 1.4–7)
NEUTROPHILS NFR BLD AUTO: 64 %
PLATELET # BLD AUTO: 305 X10E3/UL (ref 150–379)
POTASSIUM SERPL-SCNC: 4 MMOL/L (ref 3.5–5.2)
PROT SERPL-MCNC: 6.7 G/DL (ref 6–8.5)
RBC # BLD AUTO: 4.27 X10E6/UL (ref 3.77–5.28)
SODIUM SERPL-SCNC: 141 MMOL/L (ref 134–144)
TRIGL SERPL-MCNC: 174 MG/DL (ref 0–149)
VLDLC SERPL CALC-MCNC: 35 MG/DL (ref 5–40)
WBC # BLD AUTO: 6.5 X10E3/UL (ref 3.4–10.8)

## 2017-11-02 RX ORDER — BENAZEPRIL HYDROCHLORIDE 10 MG/1
TABLET ORAL
Qty: 90 TAB | Refills: 3 | Status: SHIPPED | OUTPATIENT
Start: 2017-11-02 | End: 2018-10-17 | Stop reason: SDUPTHER

## 2017-11-11 NOTE — PROGRESS NOTES
61 y.o. WHITE OR  female who presents for RPE    She followed Dr Erna Lou to  so she could continue to follow the thyroid,no new sx to report    She remains on lexapro and wants to continue    She's not been using the miralax and has found that the linzess is not working as helped. She drinks maybe 60 os fluid and not using any fiber. She has seen Dr Carlos Perez but not GI. Last thydoid testing ok    She was dx'ed w PMR by Dr Barbra Rangel and is currently on pred 10 although trying to wean down    Continues to have issues w being unable to lose weight. qsymia ineffective, we discussed the other meds available and also med supervised wt loss today. She's been trying w weight watchers. Admits that she has not been exercising much due bilat ankle issues since the beginning of 2017 for which she has been seeing Dr Christiano Clements. Only recently has she been able to start walking comfortably. The steroid above has not helped    Vitals 11/13/2017 6/28/2017 11/8/2016 10/10/2016 9/2/2016   Weight 151 lb 3.2 oz 145 lb 150 lb 147 lb 6.4 oz 149 lb      Admits to YOSELIN. She has not seen Dr Paula Rojas of late. Remains on HRT, not really doing kegel's.   No uti sx     Past Medical History:   Diagnosis Date    Anxiety 4/14    KIMBERLEY-7 was 19/21    Calculus of kidney 1980s    Depression     has tried paxil, zoloft, wellbutrin, cymbalta, trazodone    Dyslipidemia     calculated 10 year risk score was 2.5% (4/15)    Endometriosis     Fibromyalgia     Dr. Barbra Rangel in past    GERD (gastroesophageal reflux disease)     Hypertension     Multiple thyroid nodules 7/09    neg FNA Dr. Salmeron Remedies Osteopenia     DEXA t score -0.8 spine, -1.6 hip (10/07);  -0.4 spine, -1.1 hip w FRAX 5.2/0.5 (10/12); -1.1 spine, -1.0 hip (4/15); neg w/u secondary causes    Overweight (BMI 25.0-29.9)     peak weight 149 lbs, bmi 29.1 rom 9/16; failed qsymia; 24h U patria elev but LDST neg 9/16    Polymyalgia rheumatica (HCC)     Dr Wilfrid Salter Seasonal allergic rhinitis     YOSELIN (stress urinary incontinence, female) 11/13/2017    Trochanteric bursitis     Vertical diplopia 1/14    Vitamin D deficiency      Past Surgical History:   Procedure Laterality Date    COLONOSCOPY N/A 6/1/2016    Dr Clovis Malcolm 2005 neg; Dr Augustus Carter 2016 neg    EGD  2005    negative Dr. Clovis Malcolm, biopsy neg for sprue also    HX APPENDECTOMY      HX CHOLECYSTECTOMY  9/15    Dr Ventura Nida      Dr. Mitchell Parents 2003    HX HEENT  2/14    MRI negative    HX ORTHOPAEDIC  2017    Dr Marylu Epstein; avulsion fx left ankle    HX SEPTOPLASTY      HX JENNY AND BSO      NEUROLOGICAL PROCEDURE UNLISTED  6/14    MRA neck neg, MRA brain negative    NEUROLOGICAL PROCEDURE UNLISTED  6/14    MRI brain neg outside scattered minimal wm change     Social History     Social History    Marital status:      Spouse name: N/A    Number of children: 1    Years of education: N/A     Occupational History    [employer] [address]      Social History Main Topics    Smoking status: Never Smoker    Smokeless tobacco: Never Used    Alcohol use Yes      Comment: occasionally    Drug use: No    Sexual activity: Not on file     Other Topics Concern    Not on file     Social History Narrative     Family History   Problem Relation Age of Onset    Cancer Mother     Asthma Mother     Thyroid Disease Mother     Elevated Lipids Mother     Hypertension Father     Alcohol abuse Brother      Immunization History   Administered Date(s) Administered    Influenza Vaccine 10/01/2012, 10/01/2013, 10/01/2014, 10/01/2015, 10/01/2017    Influenza Vaccine Nasal 10/01/2011    Influenza Vaccine Whole 12/01/2000    TDAP Vaccine 06/19/2012     Current Outpatient Prescriptions   Medication Sig    estradiol (ESTRACE) 0.5 mg tablet Take  by mouth daily.  predniSONE (DELTASONE) 10 mg tablet Take  by mouth daily (with breakfast).  glucosamine-chondroitin (ARTHX) 500-400 mg cap Take 1 Cap by mouth daily.     lorcaserin (BELVIQ) 10 mg tab Take 1 Tab by mouth two (2) times a day. Max Daily Amount: 2 Tabs.  benazepril (LOTENSIN) 10 mg tablet TAKE 1 TABLET DAILY (SOLCO MFR)    pantoprazole (PROTONIX) 40 mg tablet Take 1 Tab by mouth daily.  furosemide (LASIX) 20 mg tablet One daily as needed for swelling    potassium chloride (KLOR-CON) 10 mEq tablet Take 1 Tab by mouth daily. w furosemide    ergocalciferol (ERGOCALCIFEROL) 50,000 unit capsule Take 1 Cap by mouth every seven (7) days.  cyclobenzaprine (FLEXERIL) 10 mg tablet TAKE 1 TABLET 3 TIMES A DAYAS NEEDED FOR MUSCLE SPASM    escitalopram oxalate (LEXAPRO) 20 mg tablet TAKE 1 TABLET DAILY    multivitamin (ONE A DAY) tablet Take 1 Tab by mouth daily.  estrogens, conjugated,-methylTESTOSTERone (ESTRATEST) 1.25-2.5 mg per tablet Take 1 Tab by mouth daily. Max Daily Amount: 1 Tab. No current facility-administered medications for this visit.       Allergies   Allergen Reactions    Augmentin [Amoxicillin-Pot Clavulanate] Rash    Avelox [Moxifloxacin] Rash    Cymbalta [Duloxetine] Unknown (comments)     Pt unsure      Elavil Unknown (comments)     Pt unsure      Lexapro [Escitalopram] Other (comments)     Wt gain    Lyrica [Pregabalin] Other (comments)     Wt gain and double vision    Paxil [Paroxetine Hcl] Other (comments)     Weight gain    Pcn [Penicillins] Rash    Trazodone Unknown (comments)     Pt unsure       REVIEW OF SYSTEMS: mammo 11/16, gyn Dr Kale Vaughan 2014/hy, colo 6/16 Dr Yasmin Hill  Ophtho - no vision change or eye pain  Oral - no mouth pain, tongue or tooth problems  Ears - no hearing loss, ear pain, fullness, no swallowing problems  Cardiac - no CP, PND, orthopnea, edema, palpitations or syncope  Chest - no breast masses  Resp - no wheezing, chronic coughing, dyspnea  GI - no heartburn, nausea, vomiting, bleeding, hemorrhoids  Urinary - no dysuria, hematuria, flank pain, urgency, frequency  Genitals - no genital lesions, discharge, masses, ulceration, warts  Ortho - no swelling, dec ROM, myalgias  Derm - no nail abnormalities, rashes, lesions of note, hair loss    Visit Vitals    /72 (BP 1 Location: Right arm, BP Patient Position: Sitting)    Pulse 88    Temp 98.8 °F (37.1 °C) (Oral)    Resp 14    Ht 5' (1.524 m)    Wt 151 lb 3.2 oz (68.6 kg)    SpO2 99%    BMI 29.53 kg/m2     A&O x3  Affect is appropriate. Mood stable  No apparent distress  Anicteric, no JVD, adenopathy. Palpable fullness in the thyroid  No carotid bruits or radiated murmur  Lungs clear to auscultation, no wheezes or rales  Heart showed regular rate and rhythm. No murmur, rubs, gallops  Abdomen soft nontender, no hepatosplenomegaly or masses. Extremities without edema.   Pulses 1-2+ symmetrically    LABS  From 7/10 showed   gluc 96, cr 0.90, gfr>60, alt 61,        chol 210, tg 179, hdl 41, ldl-c 133, wbc 6.0, hb 14.3, plt 268, ua neg  From 6/12 showed   gluc 87, cr 0.80, gfr 84,  alt 23, ldl-p 1405,                            tsh 1.46, vit d 46.6  From 1/13 showed                 ldl-p 1483, chol 200, tg 122, hdl 57, ldl-c 119  From 8/13 showed   gluc 99, cr 0.90, alt 32,          chol 190, tg 68,   hdl 47, ldl-c 129, wbc 6.3, hb 13.7, plt 310, ra neg  From 2/14 showed                 ldl-p 1423, chol 173, tg 100, hdl 47, ldl-c 106  From 4/14 showed                                     vit d 52.7, b12 538, fol>20  From 4/14 showed                      ach receptor ab neg  From 6/14 showed                      lobo neg, esr neg, vgcc ab neg  From 10/14 showed                      ace level nl, musk ab neg, lyme wb neg  From 4/15 showed   gluc 92, cr 0.92, gfr 70,         chol 202, tg 151, hdl 42, ldl-c 130, wbc 6.2, hb 13.4, plt 323  From 9/15 showed             wbc 6.2, hb 13.8, plt 302, lip 201, ck/trop neg  From 4/16 showed   gluc 90, cr 0.93, gfr 68 alt 23,         chol 204, tg 187, hdl 54, ldl-c 113, wbc 6.7, hb 14.4, plt 347, tsh 2.08, vit d 40.9, ft4 0.85, 24 hr U ca 123, 24h U patria 53, spep neg  From 5/16 showed                      pth 29, tTgAb neg  From 9/16 showed                      LDST neg, ua neg pro    Results for orders placed or performed in visit on 09/27/17   CBC WITH AUTOMATED DIFF   Result Value Ref Range    WBC 6.5 3.4 - 10.8 x10E3/uL    RBC 4.27 3.77 - 5.28 x10E6/uL    HGB 13.3 11.1 - 15.9 g/dL    HCT 40.1 34.0 - 46.6 %    MCV 94 79 - 97 fL    MCH 31.1 26.6 - 33.0 pg    MCHC 33.2 31.5 - 35.7 g/dL    RDW 13.8 12.3 - 15.4 %    PLATELET 332 325 - 348 x10E3/uL    NEUTROPHILS 64 Not Estab. %    Lymphocytes 23 Not Estab. %    MONOCYTES 9 Not Estab. %    EOSINOPHILS 3 Not Estab. %    BASOPHILS 1 Not Estab. %    ABS. NEUTROPHILS 4.2 1.4 - 7.0 x10E3/uL    Abs Lymphocytes 1.5 0.7 - 3.1 x10E3/uL    ABS. MONOCYTES 0.6 0.1 - 0.9 x10E3/uL    ABS. EOSINOPHILS 0.2 0.0 - 0.4 x10E3/uL    ABS. BASOPHILS 0.1 0.0 - 0.2 x10E3/uL    IMMATURE GRANULOCYTES 0 Not Estab. %    ABS. IMM. GRANS. 0.0 0.0 - 0.1 [id number]/YN   METABOLIC PANEL, COMPREHENSIVE   Result Value Ref Range    Glucose 92 65 - 99 mg/dL    BUN 22 6 - 24 mg/dL    Creatinine 0.88 0.57 - 1.00 mg/dL    GFR est non-AA 73 >59 mL/min/1.73    GFR est AA 84 >59 mL/min/1.73    BUN/Creatinine ratio 25 (H) 9 - 23    Sodium 141 134 - 144 mmol/L    Potassium 4.0 3.5 - 5.2 mmol/L    Chloride 101 96 - 106 mmol/L    CO2 25 18 - 29 mmol/L    Calcium 8.9 8.7 - 10.2 mg/dL    Protein, total 6.7 6.0 - 8.5 g/dL    Albumin 4.0 3.5 - 5.5 g/dL    GLOBULIN, TOTAL 2.7 1.5 - 4.5 g/dL    A-G Ratio 1.5 1.2 - 2.2    Bilirubin, total <0.2 0.0 - 1.2 mg/dL    Alk.  phosphatase 72 39 - 117 IU/L    AST (SGOT) 22 0 - 40 IU/L    ALT (SGPT) 25 0 - 32 IU/L   LIPID PANEL   Result Value Ref Range    Cholesterol, total 200 (H) 100 - 199 mg/dL    Triglyceride 174 (H) 0 - 149 mg/dL    HDL Cholesterol 61 >39 mg/dL    VLDL, calculated 35 5 - 40 mg/dL    LDL, calculated 104 (H) 0 - 99 mg/dL   HEPATITIS C AB   Result Value Ref Range    Hep C Virus Ab <0.1 0.0 - 0.9 s/co ratio   CVD REPORT   Result Value Ref Range    INTERPRETATION Note      Patient Active Problem List   Diagnosis Code    Depression and anxiety F32.9    Osteopenia 2007 Dr. Adelita Hatchet, FRAX 5.2/0.5 10/12 M85.80    Thyroid nodules neg bx Dr. Pura Brown 2009 E04.1    Dyslipidemia E78.5    Essential hypertension I10    Gastroesophageal reflux disease without esophagitis K21.9    Overweight (BMI 25.0-29. 9) E66.3    Chronic constipation K59.09    Polymyalgia rheumatica (HCC) Dr Adelita Hatchet M35.3    YOSELIN (stress urinary incontinence, female) N39.3     Assessment and plan:  1. Allergic rhinitis. Continue current  2. GERD. PPI and avoidance measures  3. Hypertension. Continue current  4. Lipids. Lifestyle and dietary measures  5. Thyroid nodules. F/U Dr. Pura Brown  6. Osteopenia. Continue current and f/u Dr Adelita Hatchet, check vit d today  7. Neuro  F/u Dr. Yassine Soto  8. Depression and anxiety. Continue current  9. Constipation. She will stay off linzess, suggested the miralax this time paying attention to the hydration status (bun/cr>20 as above). Declined gi consult for motility eval  10. Overweight. She wants to try belviq, sfx discussed at length, f/u 4 mos  11. Gyn. F/U Dr Yulia Augustine  12. YOSELIN. As #11 above, kelsye's      RTC 11/18    Above conditions discussed at length and patient vocalized understanding.   All questions answered to patient satifaction

## 2017-11-13 ENCOUNTER — HOSPITAL ENCOUNTER (OUTPATIENT)
Dept: LAB | Age: 57
Discharge: HOME | End: 2017-11-13
Payer: COMMERCIAL

## 2017-11-13 ENCOUNTER — OFFICE VISIT (OUTPATIENT)
Dept: INTERNAL MEDICINE CLINIC | Age: 57
End: 2017-11-13

## 2017-11-13 VITALS
DIASTOLIC BLOOD PRESSURE: 72 MMHG | RESPIRATION RATE: 14 BRPM | TEMPERATURE: 98.8 F | HEART RATE: 88 BPM | SYSTOLIC BLOOD PRESSURE: 100 MMHG | BODY MASS INDEX: 29.68 KG/M2 | OXYGEN SATURATION: 99 % | HEIGHT: 60 IN | WEIGHT: 151.2 LBS

## 2017-11-13 DIAGNOSIS — K21.9 GASTROESOPHAGEAL REFLUX DISEASE WITHOUT ESOPHAGITIS: ICD-10-CM

## 2017-11-13 DIAGNOSIS — F32.A DEPRESSION, UNSPECIFIED DEPRESSION TYPE: ICD-10-CM

## 2017-11-13 DIAGNOSIS — E04.1 THYROID NODULE: ICD-10-CM

## 2017-11-13 DIAGNOSIS — Z00.00 PHYSICAL EXAM: Primary | ICD-10-CM

## 2017-11-13 DIAGNOSIS — N39.3 SUI (STRESS URINARY INCONTINENCE, FEMALE): ICD-10-CM

## 2017-11-13 DIAGNOSIS — E55.9 VITAMIN D DEFICIENCY: ICD-10-CM

## 2017-11-13 DIAGNOSIS — I10 ESSENTIAL HYPERTENSION: ICD-10-CM

## 2017-11-13 DIAGNOSIS — M35.3 POLYMYALGIA RHEUMATICA (HCC): ICD-10-CM

## 2017-11-13 DIAGNOSIS — K59.09 CHRONIC CONSTIPATION: ICD-10-CM

## 2017-11-13 DIAGNOSIS — M85.80 OSTEOPENIA, UNSPECIFIED LOCATION: ICD-10-CM

## 2017-11-13 DIAGNOSIS — E78.5 DYSLIPIDEMIA: ICD-10-CM

## 2017-11-13 DIAGNOSIS — E66.3 OVERWEIGHT (BMI 25.0-29.9): ICD-10-CM

## 2017-11-13 PROCEDURE — 82306 VITAMIN D 25 HYDROXY: CPT | Performed by: INTERNAL MEDICINE

## 2017-11-13 RX ORDER — LANOLIN ALCOHOL/MO/W.PET/CERES
1 CREAM (GRAM) TOPICAL DAILY
COMMUNITY
End: 2022-01-20

## 2017-11-13 RX ORDER — ESTRADIOL 0.5 MG/1
TABLET ORAL DAILY
COMMUNITY

## 2017-11-13 RX ORDER — PREDNISONE 10 MG/1
TABLET ORAL
COMMUNITY
End: 2019-02-25

## 2017-11-13 NOTE — MR AVS SNAPSHOT
Visit Information Date & Time Provider Department Dept. Phone Encounter #  
 11/13/2017  2:00 PM Kylah Brown MD [facility] [phone] [id number] Your Appointments 11/19/2018 10:00 AM  
PHYSICAL with Kylah Brown MD  
[facility]) Appt Note: 1 year [address], [place] [id number] [place]  
  
   
 [address] [facility] Upcoming Health Maintenance Date Due Influenza Age 5 to Adult 8/1/2017 BREAST CANCER SCRN MAMMOGRAM 11/1/2018 DTaP/Tdap/Td series (2 - Td) 6/19/2022 COLONOSCOPY 6/1/2026 Allergies as of 11/13/2017  Review Complete On: 11/13/2017 By: Sapna Lim Severity Noted Reaction Type Reactions Augmentin [Amoxicillin-pot Clavulanate]  10/28/2011    Rash Avelox [Moxifloxacin]  10/28/2011    Rash Cymbalta [Duloxetine]  10/28/2011    Unknown (comments) Pt unsure Elavil  10/28/2011    Unknown (comments) Pt unsure Lexapro [Escitalopram]  04/30/2015    Other (comments) Wt gain Lyrica [Pregabalin]  01/21/2013    Other (comments) Wt gain and double vision Paxil [Paroxetine Hcl]  10/31/2014    Other (comments) Weight gain Pcn [Penicillins]  10/28/2011    Rash Trazodone  10/28/2011    Unknown (comments) Pt unsure Current Immunizations  Reviewed on 4/4/2016 Name Date Influenza Vaccine 10/1/2015, 10/1/2014, 10/1/2013, 10/1/2012 Influenza Vaccine Nasal 10/1/2011 Influenza Vaccine Whole 12/1/2000 TDAP Vaccine 6/19/2012 Not reviewed this visit You Were Diagnosed With   
  
 Codes Comments Vitamin D deficiency    -  Primary ICD-10-CM: E55.9 ICD-9-CM: 268.9 Vitals BP Pulse Temp Resp Height(growth percentile) Weight(growth percentile)  100/72 (BP 1 Location: Right arm, BP Patient Position: Sitting) 88 98.8 °F (37.1 °C) (Oral) 14 5' (1.524 m) 151 lb 3.2 oz (68.6 kg) SpO2 BMI OB Status Smoking Status 99% 29.53 kg/m2 Hysterectomy Never Smoker Vitals History BMI and BSA Data Body Mass Index Body Surface Area  
 29.53 kg/m 2 1.7 m 2 Preferred Pharmacy Pharmacy Name Phone  [address] [phone] Your Updated Medication List  
  
   
This list is accurate as of: 11/13/17  2:52 PM.  Always use your most recent med list.  
  
  
  
  
 benazepril 10 mg tablet Commonly known as:  LOTENSIN  
TAKE 1 TABLET DAILY (SOLCO MFR) cyclobenzaprine 10 mg tablet Commonly known as:  FLEXERIL  
TAKE 1 TABLET 3 TIMES A DAYAS NEEDED FOR MUSCLE SPASM  
  
 ergocalciferol 50,000 unit capsule Commonly known as:  ERGOCALCIFEROL Take 1 Cap by mouth every seven (7) days. escitalopram oxalate 20 mg tablet Commonly known as:  Alice Summer TAKE 1 TABLET DAILY  
  
 estradiol 0.5 mg tablet Commonly known as:  ESTRACE Take  by mouth daily. estrogens (conjugated)-methylTESTOSTERone 1.25-2.5 mg per tablet Commonly known as:  ESTRATEST Take 1 Tab by mouth daily. Max Daily Amount: 1 Tab.  
  
 furosemide 20 mg tablet Commonly known as:  LASIX One daily as needed for swelling  
  
 glucosamine-chondroitin 500-400 mg Cap Commonly known as:  [address] Take 1 Cap by mouth daily. LORazepam 0.5 mg tablet Commonly known as:  ATIVAN Take 1 Tab by mouth every eight (8) hours as needed for Anxiety. meloxicam 15 mg tablet Commonly known as:  MOBIC  
TAKE 1 TABLET BY MOUTH EVERY DAY AS NEEDED FOR PAIN TAKE WITH MEALS  
  
 multivitamin tablet Commonly known as:  ONE A DAY Take 1 Tab by mouth daily. pantoprazole 40 mg tablet Commonly known as:  PROTONIX Take 1 Tab by mouth daily. potassium chloride 10 mEq tablet Commonly known as:  KLOR-CON Take 1 Tab by mouth daily. w furosemide predniSONE 10 mg tablet Commonly known as:  [place] Take  by mouth daily (with breakfast). To-Do List   
 11/13/2017 Lab:  VITAMIN D, 25 HYDROXY   
  
 11/24/2017 10:00 AM  
  Appointment with LIBIA AMADOR at [address] [place] [address] ([phone]) PAYMENT  For Non-Medicare patients - $15.00 will be collected from you at the time of your exam.  You will be billed $35.00 from the reading [facility]. OUTSIDE FILMS  - Any outside films related to the study being scheduled should be brought with you on the day of the exam.  If this cannot be done there may be a delay in the reading of the study. MEDICATIONS  - Patient must bring a complete list of all medications currently taking to include prescriptions, over-the-counter meds, herbals, vitamins & any dietary supplements  GENERAL INSTRUCTIONS  - On the day of your exam do not use any bath powder, deodorant or lotions on the armpit area. -Tenderness of breasts may cause an increase of discomfort during procedure. If you are experiencing breast tenderness on the day of your appointment and would like to reschedule, please call [phone]. Introducing [facility]! Dear Arielle Reynolds: 
Thank you for requesting a [employer] account. Our records indicate that you already have an active [employer] account. You can access your account anytime at https://[employer]. [employer] Did you know that you can access your hospital and ER discharge instructions at any time in [employer]? You can also review all of your test results from your hospital stay or ER visit. Additional Information If you have questions, please visit the Frequently Asked Questions section of the [employer] website at [email or web address]. [employer]/. Remember, [employer] is NOT to be used for urgent needs. For medical emergencies, dial 911. Now available from your iPhone and Android! Please provide this summary of care documentation to your next provider. Your primary care clinician is listed as Jeferson Barkley. If you have any questions after today's visit, please call [phone].

## 2017-11-14 LAB — 25(OH)D3 SERPL-MCNC: 81.9 NG/ML (ref 30–100)

## 2017-11-16 ENCOUNTER — TELEPHONE (OUTPATIENT)
Dept: INTERNAL MEDICINE CLINIC | Age: 57
End: 2017-11-16

## 2017-11-16 NOTE — TELEPHONE ENCOUNTER
pls call    Results for orders placed or performed during the hospital encounter of 11/13/17   VITAMIN D, 25 HYDROXY   Result Value Ref Range    Vitamin D 25-Hydroxy 81.9 30 - 100 ng/mL     Dec vit d supplementation to every 3 weeks

## 2017-11-24 ENCOUNTER — HOSPITAL ENCOUNTER (OUTPATIENT)
Dept: MAMMOGRAPHY | Age: 57
Discharge: HOME | End: 2017-11-24
Attending: OBSTETRICS & GYNECOLOGY
Payer: COMMERCIAL

## 2017-11-24 DIAGNOSIS — Z12.39 BREAST CANCER SCREENING: ICD-10-CM

## 2017-11-24 PROCEDURE — 77063 BREAST TOMOSYNTHESIS BI: CPT

## 2017-12-06 ENCOUNTER — DOCUMENTATION ONLY (OUTPATIENT)
Dept: SURGERY | Age: 57
End: 2017-12-06

## 2017-12-13 ENCOUNTER — TELEPHONE (OUTPATIENT)
Dept: INTERNAL MEDICINE CLINIC | Age: 57
End: 2017-12-13

## 2017-12-13 DIAGNOSIS — J11.1 INFLUENZA: Primary | ICD-10-CM

## 2017-12-13 RX ORDER — OSELTAMIVIR PHOSPHATE 75 MG/1
75 CAPSULE ORAL 2 TIMES DAILY
Qty: 10 CAP | Refills: 0 | Status: SHIPPED | OUTPATIENT
Start: 2017-12-13 | End: 2017-12-18

## 2017-12-13 NOTE — TELEPHONE ENCOUNTER
Pt wants to get a rx for tamiflu sent to [facility] in [employer], she said she has body aches, chills, fever, etc and her  recently was diagnosed

## 2018-01-24 DIAGNOSIS — M06.09 RHEUMATOID ARTHRITIS OF MULTIPLE SITES WITH NEGATIVE RHEUMATOID FACTOR (HCC): ICD-10-CM

## 2018-01-24 DIAGNOSIS — M79.7 FIBROMYALGIA: ICD-10-CM

## 2018-02-20 ENCOUNTER — TELEPHONE (OUTPATIENT)
Dept: INTERNAL MEDICINE CLINIC | Age: 58
End: 2018-02-20

## 2018-02-20 DIAGNOSIS — J01.10 ACUTE NON-RECURRENT FRONTAL SINUSITIS: Primary | ICD-10-CM

## 2018-02-20 RX ORDER — CEFDINIR 300 MG/1
300 CAPSULE ORAL 2 TIMES DAILY
Qty: 20 CAP | Refills: 0 | Status: SHIPPED | OUTPATIENT
Start: 2018-02-20 | End: 2018-03-02

## 2018-02-20 NOTE — TELEPHONE ENCOUNTER
I feel that my sinusitis is acting up. For about 3 months I have had:   major nasal congestion with pain and pressure   not relieved with any over the counter meds   no improved with humidifiers   Waking up every morning unable to breath   Dry crusted mucos in my nose each am.   Nose dry and crusty all day.      If you decide on RX: please send to [employer] [facility]   Contact number is [phone] thanks

## 2018-03-19 RX ORDER — ESCITALOPRAM OXALATE 20 MG/1
TABLET ORAL
Qty: 90 TAB | Refills: 3 | Status: SHIPPED | OUTPATIENT
Start: 2018-03-19 | End: 2020-01-22 | Stop reason: SDUPTHER

## 2018-05-21 ENCOUNTER — HOSPITAL ENCOUNTER (OUTPATIENT)
Dept: GENERAL RADIOLOGY | Age: 58
Discharge: HOME | End: 2018-05-21
Payer: COMMERCIAL

## 2018-05-21 DIAGNOSIS — R07.81 PAIN IN RIB: ICD-10-CM

## 2018-05-21 PROCEDURE — 71100 X-RAY EXAM RIBS UNI 2 VIEWS: CPT

## 2018-05-21 PROCEDURE — 71046 X-RAY EXAM CHEST 2 VIEWS: CPT

## 2018-07-09 ENCOUNTER — TELEPHONE (OUTPATIENT)
Dept: INTERNAL MEDICINE CLINIC | Age: 58
End: 2018-07-09

## 2018-07-09 DIAGNOSIS — K59.09 CHRONIC CONSTIPATION: Primary | ICD-10-CM

## 2018-07-09 NOTE — TELEPHONE ENCOUNTER
I have been having worsening constipation. I stopped the Linzess because it only seemed to increase the abdominal pain. I have tried numerous over the counter items that either didnt help at all or increased abdominal pain. I have also tried eating more fiber and prunes. Those only cause increased mucous and gas. Current symptoms:   Stools that are usually hard small balls. I go frequently but cannot eliminate much each time. I have mucous when I am straining and do not feel like I have been emptied each time. I have gas and bloating. I am also currently being treated for Rheumatoid Arthritis, Polymyalgia Rheumatica and insomnia. Please let me know if you feel that this could be IBS and can we try another treatment.

## 2018-08-07 ENCOUNTER — DOCUMENTATION ONLY (OUTPATIENT)
Dept: INTERNAL MEDICINE CLINIC | Age: 58
End: 2018-08-07

## 2018-08-07 NOTE — PROGRESS NOTES
Pt has transferred care to [facility], records request received which has been forwarded to Ci for processing

## 2018-09-04 ENCOUNTER — HOSPITAL ENCOUNTER (OUTPATIENT)
Dept: GENERAL RADIOLOGY | Age: 58
Discharge: HOME | End: 2018-09-04
Payer: COMMERCIAL

## 2018-09-04 DIAGNOSIS — M25.552 BILATERAL HIP PAIN: ICD-10-CM

## 2018-09-04 DIAGNOSIS — M25.551 BILATERAL HIP PAIN: ICD-10-CM

## 2018-09-04 PROCEDURE — 72110 X-RAY EXAM L-2 SPINE 4/>VWS: CPT

## 2018-09-04 PROCEDURE — 73521 X-RAY EXAM HIPS BI 2 VIEWS: CPT

## 2018-09-07 ENCOUNTER — APPOINTMENT (OUTPATIENT)
Dept: PHYSICAL THERAPY | Age: 58
End: 2018-09-07

## 2018-09-24 ENCOUNTER — HOSPITAL ENCOUNTER (OUTPATIENT)
Dept: PHYSICAL THERAPY | Age: 58
Discharge: HOME | End: 2018-09-24
Payer: COMMERCIAL

## 2018-09-24 PROCEDURE — 97161 PT EVAL LOW COMPLEX 20 MIN: CPT

## 2018-09-24 PROCEDURE — 97110 THERAPEUTIC EXERCISES: CPT

## 2018-09-24 NOTE — PROGRESS NOTES
In [address] [place] [address] Square  [facility], [address] [place] [address], [address] [place], [place] [address]  [phone] fax    Plan of Care/ Statement of Necessity for Physical Therapy Services  Patient name: Pasha Elder Start of Care: 2018   Referral source: Selin Tillman MD : 1960    Medical Diagnosis: Pain in right hip [M25.551]  Pain in left hip [M25.552]   Onset Date: most recent 4 months ago    Treatment Diagnosis: B hip pain   Prior Hospitalization: see medical history Provider#: [id number]   Medications: Verified on Patient summary List    Comorbidities: fibromyaliga, osteoporosis, arthritis, RA, polymyalgia rheumatica, HTN, hx piriformis syndrome   Prior Level of Function: Independent with ADLs, functional and work tasks with worsening pain in the hips over the past few months. The Plan of Care and following information is based on the information from the initial evaluation. Assessment/ key information:   Pt is a 60year old female who presents to therapy today with B hip pain (right=left). Pt states that her most symptoms began about 4 months ago with insidious onset and her pain has been worsening since. Pt states that her pain is mostly lateral to the hips but also has pain in the anterior thigh and glutes. Pt states she was seen for therapy for the B bursitis in [id number] with some improvement in pain. Pt reports having increased pain with walking/standing and with stairs. Pt reports improvement in pain with rest, TENS unit (states she has one at home), and heat. Pt denies numbness/tingling, pain with sitting or with B sidelying. Pt demonstrated decreased AROM, decreased strength, tenderness to touch, muscle tightness. Positive B FANNIE noted with pain in the hip region (left>right). Negative B SLR test noted. Most of the objective measurements taken today increased pain in the anterior thigh/groin region.  Pt would benefit from physical therapy to improve the above impairments to help the pt return to performing ADLs, functional and work activities. Evaluation Complexity History HIGH Complexity :3+ comorbidities / personal factors will impact the outcome/ POC ; Examination HIGH Complexity : 4+ Standardized tests and measures addressing body structure, function, activity limitation and / or participation in recreation  ;Presentation LOW Complexity : Stable, uncomplicated  ;Clinical Decision Making MEDIUM Complexity : FOTO score of 26-74  Overall Complexity Rating: LOW   Problem List: pain affecting function, decrease ROM, decrease strength, decrease ADL/ functional abilitiies, decrease activity tolerance, decrease flexibility/ joint mobility and decrease transfer abilities   Treatment Plan may include any combination of the following: Therapeutic exercise, Therapeutic activities, Neuromuscular re-education, Physical agent/modality, Gait/balance training, Manual therapy, Patient education, Self Care training, Functional mobility training, Home safety training and Stair training  Patient / Family readiness to learn indicated by: asking questions, trying to perform skills and interest  Persons(s) to be included in education: patient (P)  Barriers to Learning/Limitations: None  Patient Goal (s): decrease pain  Patient Self Reported Health Status: good  Rehabilitation Potential: good    Short Term Goals: To be accomplished in 2 weeks:  1. Pt will report compliance and independence to [facility] to help the pt manage their pain and symptoms. Long Term Goals: To be accomplished in 6 weeks:  1. Pt will increase FOTO score to 61 points to improve ability to perform ADLs. 2. Pt will increase MMT right hip flex/ABD to 4/5, B hip EXT to 4-/5, B hip IR to 4/5 to improve ability to tolerate prolonged standing and walking. 3. Pt will increase AROM right hip ABD/left hip IR to WNL, right hip ER to 35 degs, left hip ER to 20 degs to improve ability to tolerate work tasks.   4. Pt will report being able to ascend/descend the stairs in her home while holding onto a laundry basket with mild to no increased difficulty/B hip pain to improve ease of mobility at home. Frequency / Duration: Patient to be seen 2 times per week for 6 weeks. Patient/ Caregiver education and instruction: Diagnosis, prognosis, self care, activity modification and exercises   [x]  Plan of care has been reviewed with SANDRA Ibarra, PT 9/24/2018 2:55 PM  _____________________________________________________________________  I certify that the above Therapy Services are being furnished while the patient is under my care. I agree with the treatment plan and certify that this therapy is necessary.     Physician's Signature:____________________  Date:__________Time:______    Please sign and return to [address], [address] [place] [address], [address] [place] [address],[facility] [address]  [phone] fax

## 2018-09-24 NOTE — PROGRESS NOTES
PT DAILY TREATMENT NOTE - [facility]     Patient Name: Seth Estevez  Date:2018  : 1960  [x]  Patient  Verified  Payor: Aleksander Doran / Plan: 54 [employer] HMO / Product Type: HMO /    In time: 2:39  Out time:3:18  Total Treatment Time (min): 39  Total Timed Codes (min): 10  1:1 Treatment Time ( [address] only): 44   Visit #: 1 of 12    Treatment Area: Pain in right hip [M25.551]  Pain in left hip [M25.552]    SUBJECTIVE  Pain Level (0-10 scale): 3  Any medication changes, allergies to medications, adverse drug reactions, diagnosis change, or new procedure performed?: [x] No    [] Yes (see summary sheet for update)  Subjective functional status/changes:   [] No changes reported  See POC    OBJECTIVE    29 min [x]Eval                  []Re-Eval     10 min Therapeutic Exercise:  [] See flow sheet : HEP instruction and demonstration, pt education regarding anatomy and physiology of the LEs and how it relates to the pt's condition. Rationale: increase ROM and increase strength to improve the patients ability to tolerate ADLs          With   [] TE   [] TA   [] neuro   [] other: Patient Education: [x] Review HEP    [] Progressed/Changed HEP based on:   [] positioning   [] body mechanics   [] transfers   [] heat/ice application    [] other:      Other Objective/Functional Measures: See evaluation. Pain Level (0-10 scale) post treatment: 3    ASSESSMENT/Changes in Function: Pt given HEP handout to perform. Pt understood exercises in HEP handout. Educated pt that she can use heat as needed for 15-20 mins to help with the pain/symptoms. Also educated pt that she can use the home TENS unit as well for 15-20 mins to help with the pain/symptoms in her hips (pt denies any contraindications for TENS unit and states she has a TENS unit at home). Pt demonstrated decreased AROM, decreased strength, tenderness to touch, muscle tightness. Positive B FANNIE noted with pain in the hip region (left>right).  Negative B SLR test noted. Most of the objective measurements taken today increased pain in the anterior thigh/groin region. Pt would benefit from physical therapy to improve the above impairments to help the pt return to performing ADLs, functional and work activities. Patient will continue to benefit from skilled PT services to modify and progress therapeutic interventions, address functional mobility deficits, address ROM deficits, address strength deficits, analyze and address soft tissue restrictions, analyze and cue movement patterns, analyze and modify body mechanics/ergonomics, address imbalance/dizziness and instruct in home and community integration to attain remaining goals. [x]  See Plan of Care  []  See progress note/recertification  []  See Discharge Summary         Progress towards goals / Updated goals:  Short Term Goals: To be accomplished in 2 weeks:  1. Pt will report compliance and independence to [facility] to help the pt manage their pain and symptoms. Eval: established   Long Term Goals: To be accomplished in 6 weeks:  1. Pt will increase FOTO score to 61 points to improve ability to perform ADLs. Eval: 45 points  2. Pt will increase MMT right hip flex/ABD to 4/5, B hip EXT to 4-/5, B hip IR to 4/5 to improve ability to tolerate prolonged standing and walking. Eval: right hip flex/ABD 4-/5, B hip EXT 3+/5, B hip IR 4-/5 (all with increased hip pain)  3. Pt will increase AROM right hip ABD/left hip IR to WNL, right hip ER to 35 degs, left hip ER to 20 degs to improve ability to tolerate work tasks. Eval: right hip ABD 39 degs with hip pain, left hip IR 40 degs, right hip ER 28 degs with hip pain, left hip ER 12 degs with hip pain  4. Pt will report being able to ascend/descend the stairs in her home while holding onto a laundry basket with mild to no increased difficulty/B hip pain to improve ease of mobility at home.   Eval: Reports having significantly increased B hip pain with ascending/descending her stairs at home while holding onto something     PLAN  [x]  Upgrade activities as tolerated     [x]  Continue plan of care  [x]  Update interventions per flow sheet       []  Discharge due to:_  []  Other:_      Kendall Edmondson PT 9/24/2018  2:59 PM    Future Appointments  Date Time Provider Lian Bustamante   9/24/2018 2:30 PM [facility]

## 2018-09-28 ENCOUNTER — HOSPITAL ENCOUNTER (OUTPATIENT)
Dept: PHYSICAL THERAPY | Age: 58
Discharge: HOME | End: 2018-09-28
Payer: COMMERCIAL

## 2018-09-28 PROCEDURE — 97110 THERAPEUTIC EXERCISES: CPT

## 2018-09-28 NOTE — PROGRESS NOTES
PT DAILY TREATMENT NOTE - [facility]     Patient Name: Ainsley Frazier  Date:2018  : 1960  [x]  Patient  Verified  Payor: Braydonshalini Jj / Plan: Jayla [employer] Se HMO / Product Type: HMO /    In time: 3:35   Out time: 4:23  Total Treatment Time (min): 48  Visit #: 2 of 12    Treatment Area: Pain in right hip [M25.551]  Pain in left hip [M25.552]    SUBJECTIVE  Pain Level (0-10 scale): 3  Any medication changes, allergies to medications, adverse drug reactions, diagnosis change, or new procedure performed?: [x] No    [] Yes (see summary sheet for update)  Subjective functional status/changes:   [] No changes reported  Reports compliance with HEP.      OBJECTIVE    Modality rationale: decrease pain and increase tissue extensibility to improve the patients ability to tolerate functional tasks   Min Type Additional Details    [] Estim:  []Unatt       []IFC  []Premod                        []Other:  []w/ice   []w/heat  Position:  Location:    [] Estim: []Att    []TENS instruct  []NMES                    []Other:  []w/US   []w/ice   []w/heat  Position:  Location:    []  Traction: [] Cervical       []Lumbar                       [] Prone          []Supine                       []Intermittent   []Continuous Lbs:  [] before manual  [] after manual    []  Ultrasound: []Continuous   [] Pulsed                           []1MHz   []3MHz Location:  W/cm2:    []  Iontophoresis with dexamethasone         Location: [] Take home patch   [] In clinic   15 []  Ice     [x]  heat  []  Ice massage  []  Laser   []  Anodyne Position: supine with LEs on wedge  Location: B hips    []  Laser with stim  []  Other: Position:  Location:    []  Vasopneumatic Device Pressure:       [] lo [] med [] hi   Temperature: [] lo [] med [] hi   [] Skin assessment post-treatment:  []intact []redness- no adverse reaction    []redness - adverse reaction:     33 min Therapeutic Exercise:  [x] See flow sheet :    Rationale: increase ROM and increase strength to improve the patients ability to tolerate ADLs          With   [] TE   [] TA   [] neuro   [] other: Patient Education: [x] Review HEP    [] Progressed/Changed HEP based on:   [] positioning   [] body mechanics   [] transfers   [] heat/ice application    [] other:      Other Objective/Functional Measures: Unable to perform clams with yellow tband secondary to pain. Pain Level (0-10 scale) post treatment: 2    ASSESSMENT/Changes in Function: Mild decrease in pain reported post session. Pt reports having pain with piriformis ER stretch and educated pt to perform to tolerance. Continue POC as tolerated. Patient will continue to benefit from skilled PT services to modify and progress therapeutic interventions, address functional mobility deficits, address ROM deficits, address strength deficits, analyze and address soft tissue restrictions, analyze and cue movement patterns, analyze and modify body mechanics/ergonomics, address imbalance/dizziness and instruct in home and community integration to attain remaining goals. []  See Plan of Care  []  See progress note/recertification  []  See Discharge Summary         Progress towards goals / Updated goals:  Short Term Goals: To be accomplished in 2 weeks:  1. Pt will report compliance and independence to HEP to help the pt manage their pain and symptoms. Eval: established   Long Term Goals: To be accomplished in 6 weeks:  1. Pt will increase FOTO score to 61 points to improve ability to perform ADLs. Eval: 45 points  2. Pt will increase MMT right hip flex/ABD to 4/5, B hip EXT to 4-/5, B hip IR to 4/5 to improve ability to tolerate prolonged standing and walking. Eval: right hip flex/ABD 4-/5, B hip EXT 3+/5, B hip IR 4-/5 (all with increased hip pain)  3. Pt will increase AROM right hip ABD/left hip IR to WNL, right hip ER to 35 degs, left hip ER to 20 degs to improve ability to tolerate work tasks.   Eval: right hip ABD 39 degs with hip pain, left hip IR 40 degs, right hip ER 28 degs with hip pain, left hip ER 12 degs with hip pain  4. Pt will report being able to ascend/descend the stairs in her home while holding onto a laundry basket with mild to no increased difficulty/B hip pain to improve ease of mobility at home.   Eval: Reports having significantly increased B hip pain with ascending/descending her stairs at home while holding onto something     PLAN  [x]  Upgrade activities as tolerated     [x]  Continue plan of care  [x]  Update interventions per flow sheet       []  Discharge due to:_  []  Other:_      Orvis Neighbor, PT 9/28/2018  3:55 PM    Future Appointments  Date Time Provider Lian Bustamante   10/1/2018 12:30 PM Orvis Neighbor, PT [facility]   10/3/2018 10:30 AM Orvis Neighbor, PT [facility]   10/8/2018 12:00 PM Orvis Neighbor, PT [facility]   10/10/2018 5:30 PM Orvis Neighbor, PT [facility]   10/15/2018 12:00 PM Orvis Neighbor, PT [facility]   10/17/2018 11:30 AM Orvis Neighbor, PT [facility]   10/22/2018 11:30 AM Orvis Neighbor, PT [facility]   10/24/2018 5:00 PM Orvis Neighbor, PT [facility]   10/29/2018 3:30 PM Orvis Neighbor, PT [facility]   10/31/2018 10:30 AM Orvis Neighbor, PT [facility]

## 2018-10-01 ENCOUNTER — HOSPITAL ENCOUNTER (OUTPATIENT)
Dept: PHYSICAL THERAPY | Age: 58
Discharge: HOME | End: 2018-10-01
Payer: COMMERCIAL

## 2018-10-01 PROCEDURE — 97110 THERAPEUTIC EXERCISES: CPT

## 2018-10-01 NOTE — PROGRESS NOTES
PT DAILY TREATMENT NOTE - [facility]     Patient Name: Yusra Joseph  Date:10/1/2018  : 1960  [x]  Patient  Verified  Payor: López Lyon / Plan: Jayla [employer] Se HMO / Product Type: HMO /    In time: 12:27    Out time: 1:03  Total Treatment Time (min): 36  Visit #: 3 of 12    Treatment Area: Pain in right hip [M25.551]  Pain in left hip [M25.552]    SUBJECTIVE  Pain Level (0-10 scale): 3  Any medication changes, allergies to medications, adverse drug reactions, diagnosis change, or new procedure performed?: [x] No    [] Yes (see summary sheet for update)  Subjective functional status/changes:   [] No changes reported  Pt reports no change since the last session and has been doing the HEP. Pt reports she thinks the heat post session after last session helped with the pain. OBJECTIVE    36 min Therapeutic Exercise:  [x] See flow sheet :    Rationale: increase ROM and increase strength to improve the patients ability to tolerate ADLs          With   [] TE   [] TA   [] neuro   [] other: Patient Education: [x] Review HEP    [] Progressed/Changed HEP based on:   [] positioning   [] body mechanics   [] transfers   [] heat/ice application    [] other:      Other Objective/Functional Measures: Needed cueing to avoid excessive trunk rotation with prone hip EXT. Pain Level (0-10 scale) post treatment: 3    ASSESSMENT/Changes in Function: Reported no change in pain post session. Pt reported having less pain/tightness with piriformis stretch today on each LE compared to last session. Pt reported having less discomfort with clams today as well compared to last session. Pt denied MHP post session today and educated pt that she can use a hot pack at home for 15-20 mins at a time. Continue POC as tolerated.      Patient will continue to benefit from skilled PT services to modify and progress therapeutic interventions, address functional mobility deficits, address ROM deficits, address strength deficits, analyze and address soft tissue restrictions, analyze and cue movement patterns, analyze and modify body mechanics/ergonomics, address imbalance/dizziness and instruct in home and community integration to attain remaining goals. []  See Plan of Care  []  See progress note/recertification  []  See Discharge Summary         Progress towards goals / Updated goals:  Short Term Goals: To be accomplished in 2 weeks:  1. Pt will report compliance and independence to [facility] to help the pt manage their pain and symptoms. Eval: established   Current: reports compliance with HEP 10/1/2018  Long Term Goals: To be accomplished in 6 weeks:  1. Pt will increase FOTO score to 61 points to improve ability to perform ADLs. Eval: 45 points  2. Pt will increase MMT right hip flex/ABD to 4/5, B hip EXT to 4-/5, B hip IR to 4/5 to improve ability to tolerate prolonged standing and walking. Eval: right hip flex/ABD 4-/5, B hip EXT 3+/5, B hip IR 4-/5 (all with increased hip pain)  3. Pt will increase AROM right hip ABD/left hip IR to WNL, right hip ER to 35 degs, left hip ER to 20 degs to improve ability to tolerate work tasks. Eval: right hip ABD 39 degs with hip pain, left hip IR 40 degs, right hip ER 28 degs with hip pain, left hip ER 12 degs with hip pain  4. Pt will report being able to ascend/descend the stairs in her home while holding onto a laundry basket with mild to no increased difficulty/B hip pain to improve ease of mobility at home.   Eval: Reports having significantly increased B hip pain with ascending/descending her stairs at home while holding onto something     PLAN  [x]  Upgrade activities as tolerated     [x]  Continue plan of care  [x]  Update interventions per flow sheet       []  Discharge due to:_  []  Other:_      Trev Frausto PT 10/1/2018  12:42 PM    Future Appointments  Date Time Provider Lian Bustamante   10/3/2018 10:30 [facility]   10/8/2018 12:00 PM Trev Frausto PT [facility] [address]   10/10/2018 5:30 PM Marce Clinton, PT [facility] [address]   10/18/2018 5:00 PM Jimmy Herman, PT [facility] [address]   10/22/2018 11:30 AM Marce Clinton, PT [facility] [address]   10/24/2018 5:00 PM Marce Clinton, PT [facility] [address]   10/29/2018 3:30 PM Marce Clinton, PT [facility] [address]   10/31/2018 10:30 AM Marce Clinton, PT [facility] [address]

## 2018-10-03 ENCOUNTER — HOSPITAL ENCOUNTER (OUTPATIENT)
Dept: PHYSICAL THERAPY | Age: 58
Discharge: HOME | End: 2018-10-03
Payer: COMMERCIAL

## 2018-10-03 PROCEDURE — 97110 THERAPEUTIC EXERCISES: CPT

## 2018-10-03 NOTE — PROGRESS NOTES
PT DAILY TREATMENT NOTE 10-18    Patient Name: Tracy Fuller  Date:10/3/2018  : 1960  [x]  Patient  Verified  Payor: Girma Singh / Plan: Jayla Capps Se HMO / Product Type: HMO /    In time:10:30   Out time:11:10  Total Treatment Time (min): 23  Visit #: 4 of 12    Medicare/BCBS Only   Total Timed Codes (min):   1:1 Treatment Time:         Treatment Area: Pain in right hip [M25.551]  Pain in left hip [M25.552]    SUBJECTIVE  Pain Level (0-10 scale): 2  Any medication changes, allergies to medications, adverse drug reactions, diagnosis change, or new procedure performed?: [x] No    [] Yes (see summary sheet for update)  Subjective functional status/changes:   [] No changes reported  Soreness.     OBJECTIVE    Modality rationale: decrease edema, decrease inflammation, decrease pain and increase tissue extensibility to improve the patients ability to PERFORM ADL   Min Type Additional Details    [] Estim:  []Unatt       []IFC  []Premod                        []Other:  []w/ice   []w/heat  Position:  Location:    [] Estim: []Att    []TENS instruct  []NMES                    []Other:  []w/US   []w/ice   []w/heat  Position:  Location:    []  Traction: [] Cervical       []Lumbar                       [] Prone          []Supine                       []Intermittent   []Continuous Lbs:  [] before manual  [] after manual    []  Ultrasound: []Continuous   [] Pulsed                           []1MHz   []3MHz W/cm2:  Location:    []  Iontophoresis with dexamethasone         Location: [] Take home patch   [] In clinic    []  Ice     []  heat  []  Ice massage  []  Laser   []  Anodyne Position:  Location:    []  Laser with stim  []  Other:  Position:  Location:    []  Vasopneumatic Device Pressure:       [] lo [] med [] hi   Temperature: [] lo [] med [] hi   [x] Skin assessment post-treatment:  [x]intact []redness- no adverse reaction    []redness - adverse reaction:      min []Eval                  []Re-Eval       23 min Therapeutic Exercise:  [x] See flow sheet :   Rationale: increase ROM and increase strength to improve the patients ability to perform ADL     min Therapeutic Activity:  []  See flow sheet :   Rationale:   to improve the patients ability to       min Neuromuscular Re-education:  []  See flow sheet :   Rationale:   to improve the patients ability to      min Manual Therapy:     Rationale: decrease pain, increase ROM, increase tissue extensibility and decrease edema  to perform ADL     min Gait Training:  ___ feet with ___ device on level surfaces with ___ level of assist   Rationale: With   [x] TE   [] TA   [] neuro   [] other: Patient Education: [x] Review HEP    [] Progressed/Changed HEP based on:   [] positioning   [] body mechanics   [] transfers   [] heat/ice application    [] other:      Other Objective/Functional Measures:  Completed  Each there ex  Fairly  Well. Pain Level (0-10 scale) post treatment: 2    ASSESSMENT/Changes in Function: continued  With residual soreness  Following therex. Pt  Declined  MH  Due  To that will do it  At home. Patient will continue to benefit from skilled PT services to address functional mobility deficits, address ROM deficits, address strength deficits, analyze and address soft tissue restrictions, analyze and cue movement patterns and instruct in home and community integration to attain remaining goals. [x]  See Plan of Care  []  See progress note/recertification  []  See Discharge Summary         Progress towards goals / Updated goals:  Short Term Goals: To be accomplished in 2 weeks:  1. Pt will report compliance and independence to HEP to help the pt manage their pain and symptoms.                       Eval: established   Current: reports compliance with HEP 10/1/2018  Long Term Goals: To be accomplished in 6 weeks:  1. Pt will increase FOTO score to 61 points to improve ability to perform ADLs. Eval: 45 points  2.  Pt will increase MMT right hip flex/ABD to 4/5, B hip EXT to 4-/5, B hip IR to 4/5 to improve ability to tolerate prolonged standing and walking. Eval: right hip flex/ABD 4-/5, B hip EXT 3+/5, B hip IR 4-/5 (all with increased hip pain)  3. Pt will increase AROM right hip ABD/left hip IR to WNL, right hip ER to 35 degs, left hip ER to 20 degs to improve ability to tolerate work tasks. Eval: right hip ABD 39 degs with hip pain, left hip IR 40 degs, right hip ER 28 degs with hip pain, left hip ER 12 degs with hip pain  4. Pt will report being able to ascend/descend the stairs in her home while holding onto a laundry basket with mild to no increased difficulty/B hip pain to improve ease of mobility at home.   Eval: Reports having significantly increased B hip pain with ascending/descending her stairs at home while holding onto something        PLAN  []  Upgrade activities as tolerated     [x]  Continue plan of care  []  Update interventions per flow sheet       []  Discharge due to:_  []  Other:_      Maria L Carroll PTA 10/3/2018  11:05 AM    Future Appointments  Date Time Provider Lian Bustamante   10/8/2018 12:00 PM Bisi Christianson PT [facility]   10/10/2018 5:30 PM Bisi Christianson PT [facility]   10/18/2018 5:00 PM Maria L Vega PTA [facility]   10/22/2018 11:30 AM Bisi Christianson PT [facility]   10/24/2018 5:00 PM Bisi Christianson PT [facility]   10/29/2018 3:30 PM Bisi Christianson PT [facility]   10/31/2018 10:30 AM Bisi Christianson PT [facility]

## 2018-10-08 ENCOUNTER — HOSPITAL ENCOUNTER (OUTPATIENT)
Dept: PHYSICAL THERAPY | Age: 58
Discharge: HOME | End: 2018-10-08
Payer: COMMERCIAL

## 2018-10-08 PROCEDURE — 97110 THERAPEUTIC EXERCISES: CPT

## 2018-10-08 NOTE — PROGRESS NOTES
PT DAILY TREATMENT NOTE 10-18    Patient Name: Lissette Daily  Date:10/8/2018  : 1960  [x]  Patient  Verified  Payor: Edna Machadolg / Plan: Jayla Capps Se HMO / Product Type: HMO /    In time: 11:59    Out time: 12:40  Total Treatment Time (min): 41  Visit #: 5 of 12    Medicare/BCBS Only   Total Timed Codes (min): n/a  1:1 Treatment Time: n/a       Treatment Area: Pain in right hip [M25.551]  Pain in left hip [M25.552]    SUBJECTIVE  Pain Level (0-10 scale): 4  Any medication changes, allergies to medications, adverse drug reactions, diagnosis change, or new procedure performed?: [x] No    [] Yes (see summary sheet for update)  Subjective functional status/changes:   [] No changes reported  Pt reports having increased pain the entire weekend and it was the worst last night with insidious onset. Pt reports she saw the rheumatologist on Thursday and he said that the inflammation throughout the body is high and they increased the prednisone to help with the inflammation. OBJECTIVE    41 min Therapeutic Exercise:  [x] See flow sheet :   Rationale: increase ROM and increase strength to improve the patients ability to perform ADLs          With   [x] TE   [] TA   [] neuro   [] other: Patient Education: [x] Review HEP    [] Progressed/Changed HEP based on:   [] positioning   [] body mechanics   [] transfers   [] heat/ice application    [] other:      Other Objective/Functional Measures: Increased reps per flow sheet and added orange tband to hipx3 exercise to improve strength in the LEs. Pain Level (0-10 scale) post treatment: 4    ASSESSMENT/Changes in Function: Reported no change in pain post session. Pt denied MHP post session and stated she will use heat at home. Educated pt to continue to stretch and encouraged mobility to avoid stiffness in the B LEs and hips. Pt reported having no increased pain with exercises today. Continue POC as tolerated.      Patient will continue to benefit from skilled PT services to modify and progress therapeutic interventions, address functional mobility deficits, address ROM deficits, address strength deficits, analyze and address soft tissue restrictions, analyze and cue movement patterns, analyze and modify body mechanics/ergonomics, address imbalance/dizziness and instruct in home and community integration to attain remaining goals. []  See Plan of Care  []  See progress note/recertification  []  See Discharge Summary         Progress towards goals / Updated goals:  Short Term Goals: To be accomplished in 2 weeks:  1. Pt will report compliance and independence to HEP to help the pt manage their pain and symptoms.                       Eval: established   Current: reports compliance with HEP 10/1/2018  Long Term Goals: To be accomplished in 6 weeks:  1. Pt will increase FOTO score to 61 points to improve ability to perform ADLs. Eval: 45 points  2. Pt will increase MMT right hip flex/ABD to 4/5, B hip EXT to 4-/5, B hip IR to 4/5 to improve ability to tolerate prolonged standing and walking. Eval: right hip flex/ABD 4-/5, B hip EXT 3+/5, B hip IR 4-/5 (all with increased hip pain)  3. Pt will increase AROM right hip ABD/left hip IR to WNL, right hip ER to 35 degs, left hip ER to 20 degs to improve ability to tolerate work tasks. Eval: right hip ABD 39 degs with hip pain, left hip IR 40 degs, right hip ER 28 degs with hip pain, left hip ER 12 degs with hip pain  4. Pt will report being able to ascend/descend the stairs in her home while holding onto a laundry basket with mild to no increased difficulty/B hip pain to improve ease of mobility at home.   Eval: Reports having significantly increased B hip pain with ascending/descending her stairs at home while holding onto something      PLAN  [x]  Upgrade activities as tolerated     [x]  Continue plan of care  [x]  Update interventions per flow sheet       []  Discharge due to:_  []  Other:_      April Foote PT 10/8/2018  12:27 PM    Future Appointments  Date Time Provider Lian Mci   10/8/2018 12:00 PM Catheryn Check, PT [facility]   10/10/2018 5:30 PM Catheryn Check, PT [facility]   10/18/2018 5:00 PM Jesús Carrizales, PTA [facility]   10/22/2018 11:30 AM Catheryn Check, PT [facility]   10/24/2018 5:00 PM Catheryn Check, PT [facility]   10/29/2018 3:30 PM Catheryn Check, PT [facility]   10/31/2018 10:30 AM Catheryn Check, PT [facility]

## 2018-10-10 ENCOUNTER — HOSPITAL ENCOUNTER (OUTPATIENT)
Dept: PHYSICAL THERAPY | Age: 58
End: 2018-10-10
Payer: COMMERCIAL

## 2018-10-15 ENCOUNTER — APPOINTMENT (OUTPATIENT)
Dept: PHYSICAL THERAPY | Age: 58
End: 2018-10-15
Payer: COMMERCIAL

## 2018-10-17 ENCOUNTER — APPOINTMENT (OUTPATIENT)
Dept: PHYSICAL THERAPY | Age: 58
End: 2018-10-17
Payer: COMMERCIAL

## 2018-10-17 RX ORDER — BENAZEPRIL HYDROCHLORIDE 10 MG/1
TABLET ORAL
Qty: 90 TAB | Refills: 3 | Status: SHIPPED | OUTPATIENT
Start: 2018-10-17 | End: 2019-12-30

## 2018-10-18 ENCOUNTER — HOSPITAL ENCOUNTER (OUTPATIENT)
Dept: PHYSICAL THERAPY | Age: 58
Discharge: HOME | End: 2018-10-18
Payer: COMMERCIAL

## 2018-10-18 PROCEDURE — 97110 THERAPEUTIC EXERCISES: CPT

## 2018-10-22 ENCOUNTER — HOSPITAL ENCOUNTER (OUTPATIENT)
Dept: PHYSICAL THERAPY | Age: 58
Discharge: HOME | End: 2018-10-22
Payer: COMMERCIAL

## 2018-10-22 PROCEDURE — 97110 THERAPEUTIC EXERCISES: CPT

## 2018-10-22 NOTE — PROGRESS NOTES
PT DISCHARGE DAILY NOTE AND [id number]    Date:10/22/2018  Patient name: Carolyn Godfrey Cogan Start of Care: 2018   Referral source: Alexandra Tillman MD : 1960               Medical Diagnosis: Pain in right hip [M25.551]  Pain in left hip [M25.552]    Onset Date: most recent 4 months ago               Treatment Diagnosis: B hip pain   Prior Hospitalization: see medical history Provider#: [id number]   Medications: Verified on Patient summary List    Comorbidities: fibromyaliga, osteoporosis, arthritis, RA, polymyalgia rheumatica, HTN, hx piriformis syndrome   Prior Level of Function: Independent with ADLs, functional and work tasks with worsening pain in the hips over the past few months.   Visits from Start of Care: 7    Missed Visits: 1  Reporting Period : 2018 to 10/22/2018    Date:10/22/2018  : 1960  [x]  Patient  Ver[facility]  [facility] / Plan: Jayla Capps Se HMO / Product Type: HMO /    In time:11:25  Out time:12:12  Total Treatment Time (min): 52  Visit #: 7 of 12    Medicare/BCBS Only   Total Timed Codes (min):  n/a 1:1 Treatment Time:  n/a       SUBJECTIVE  Pain Level (0-10 scale): 3 left, 2 right  Any medication changes, allergies to medications, adverse drug reactions, diagnosis change, or new procedure performed?: [x] No    [] Yes (see summary sheet for update)  Subjective functional status/changes:   [] No changes reported  Pt reports improvement in strength since starting therapy but continues to have pain in the B hips.     OBJECTIVE    47 min Therapeutic Exercise:  [x] See flow sheet : exercises, goal reassessment, HEP instructions and demonstration   Rationale: increase ROM and increase strength to improve the patients ability to tolerate ADLs          With   [] TE   [] TA   [] neuro   [] other: Patient Education: [x] Review HEP    [] Progressed/Changed HEP based on:   [] positioning   [] body mechanics   [] transfers   [] heat/ice application    [] other:      Other Objective/Functional Measures: See goals below. Functional Gains: ROM, strength  Functional Deficits: pain, pain with lifting, prolonged standing (30 mins before she has to sit)  Pain         Best: 2/10                Worst: 5/10    Pain Level (0-10 scale) post treatment: 3 left, 2 right    Summary of Care:  Goal: Pt will report compliance and independence to [facility] to help the pt manage their pain and symptoms. Status at last note/certification: MET reports compliance  Status at discharge: met    Goal: Pt will increase FOTO score to 61 points to improve ability to perform ADLs. Status at last note/certification: not met, 44 points  Status at discharge: not met    Goal: Pt will increase MMT right hip flex/ABD to 4/5, B hip EXT to 4-/5, B hip IR to 4/5 to improve ability to tolerate prolonged standing and walking. Status at last note/certification: not met, right hip flex 4/5 with minimal hip pain, right hip ABD 4/5, right hip EXT 4-/5 with minimal hip pain, left hip EXT 4-/5 with mild hip pain, B hip IR 4-/5   Status at discharge: not met    Goal: Pt will increase AROM right hip ABD/left hip IR to WNL, right hip ER to 35 degs, left hip ER to 20 degs to improve ability to tolerate work tasks. Status at last note/certification: not met, right hip ABD 41 degs with no pain, left hip IR WNL with no pain, right hip ER 34 degs, left hip ER 37 degs with mild hip pain  Status at discharge: not met    Goal: Pt will report being able to ascend/descend the stairs in her home while holding onto a laundry basket with mild to no increased difficulty/B hip pain to improve ease of mobility at home. Status at last note/certification: not met, reports improvement in pain with stairs but they still increase her pain   Status at discharge: not met    ASSESSMENT/Changes in Function:   Pt was seen for 7 therapy sessions. Pt demonstrated improvements in ROM and strength since starting therapy.  Pt reports that she continues to have elevated pain levels in her left>right hips despite the improvements in strength and ROM. Educated pt to follow up with the MD regarding her pain levels (states she has an appointment in November). Pt reports she also has a TENS unit at home and educated pt that she can use this for 15-20 mins as needed to help with her symptoms/pain. Pt given updated HEP to perform and understood exercises in HEP handout (also gave pt orange and green tbands for the HEP exercises). Pt is d/c'ed from therapy at this time to HEP secondary to pt continuing to have elevated pain levels in the B hips and ability to independently perform HEP to manage current ROM and strength deficits. Thank you for this referral!      PLAN  [x]Discontinue therapy: []Patient has reached or is progressing toward set goals      []Patient is non-compliant or has abdicated      [x]Due to lack of appreciable progress towards set goals. Continues to have elevated pain levels despite improvements with strength and ROM.      Tiffanie Hankins, PT 10/22/2018  2:00 PM

## 2018-10-24 ENCOUNTER — APPOINTMENT (OUTPATIENT)
Dept: PHYSICAL THERAPY | Age: 58
End: 2018-10-24
Payer: COMMERCIAL

## 2018-10-29 ENCOUNTER — APPOINTMENT (OUTPATIENT)
Dept: PHYSICAL THERAPY | Age: 58
End: 2018-10-29
Payer: COMMERCIAL

## 2018-10-31 ENCOUNTER — APPOINTMENT (OUTPATIENT)
Dept: PHYSICAL THERAPY | Age: 58
End: 2018-10-31
Payer: COMMERCIAL

## 2018-11-26 ENCOUNTER — HOSPITAL ENCOUNTER (OUTPATIENT)
Dept: MAMMOGRAPHY | Age: 58
Discharge: HOME | End: 2018-11-26
Attending: FAMILY MEDICINE
Payer: COMMERCIAL

## 2018-11-26 DIAGNOSIS — Z12.31 VISIT FOR SCREENING MAMMOGRAM: ICD-10-CM

## 2018-11-26 PROCEDURE — 77063 BREAST TOMOSYNTHESIS BI: CPT

## 2019-02-13 ENCOUNTER — TELEPHONE (OUTPATIENT)
Dept: INTERNAL MEDICINE CLINIC | Age: 59
End: 2019-02-13

## 2019-02-13 NOTE — TELEPHONE ENCOUNTER
Pt calling asking to see RD. We have her listed ans transferring care to Dr. Licha Cardenas, [facility]. She says she only say the doctor for an ulcer. Wants to know if RD will still continue to see her as her pcp?

## 2019-02-25 ENCOUNTER — OFFICE VISIT (OUTPATIENT)
Dept: INTERNAL MEDICINE CLINIC | Age: 59
End: 2019-02-25

## 2019-02-25 VITALS
DIASTOLIC BLOOD PRESSURE: 61 MMHG | TEMPERATURE: 98.1 F | OXYGEN SATURATION: 98 % | SYSTOLIC BLOOD PRESSURE: 121 MMHG | HEIGHT: 60 IN | WEIGHT: 136 LBS | HEART RATE: 70 BPM | BODY MASS INDEX: 26.7 KG/M2 | RESPIRATION RATE: 14 BRPM

## 2019-02-25 DIAGNOSIS — F51.04 CHRONIC INSOMNIA: Primary | ICD-10-CM

## 2019-02-25 DIAGNOSIS — F41.9 ANXIETY: ICD-10-CM

## 2019-02-25 DIAGNOSIS — M35.3 POLYMYALGIA RHEUMATICA (HCC): ICD-10-CM

## 2019-02-25 RX ORDER — ZOLPIDEM TARTRATE 5 MG/1
TABLET ORAL
COMMUNITY
End: 2019-02-25 | Stop reason: ALTCHOICE

## 2019-02-25 RX ORDER — DOXEPIN HYDROCHLORIDE 6 MG/1
6 TABLET ORAL
Qty: 30 TAB | Refills: 5 | Status: SHIPPED | OUTPATIENT
Start: 2019-02-25 | End: 2019-03-19 | Stop reason: SDUPTHER

## 2019-02-25 RX ORDER — LORAZEPAM 0.5 MG/1
0.5 TABLET ORAL
Qty: 40 TAB | Refills: 1 | OUTPATIENT
Start: 2019-02-25 | End: 2019-03-01 | Stop reason: SDUPTHER

## 2019-02-25 NOTE — PROGRESS NOTES
1. Have you been to the ER, urgent care clinic or hospitalized since your last visit? NO.     2. Have you seen or consulted any other health care providers outside of the [address] [place] [address] since your last visit (Include any pap smears or colon screening)? NO    Chief Complaint   Patient presents with    Insomnia     gradually worsen in last year. ambien not as effective.  only sleeping about 3 hours a night

## 2019-02-25 NOTE — PROGRESS NOTES
60 y.o. WHITE OR  female who presents for evaluation. We have not seen her since late 2017. She transiently saw Dr. Diana Sage to try to help with her insomnia but is back to us. She is been having issues with insomnia for a while, Ambien used to work but currently is only letting her sleep about 3 hours. She reports that her energy level is okay even with decreased sleep as above. She is on Lexapro which is been helping her depression issues. She was apparently diagnosed with rheumatoid arthritis by Dr. Perry Thacker, currently on Plaquenil. She was on Flexeril and gabapentin but these were DC'd. She has seen Dr. Frank Beard also. She is tried trazodone before, she is requesting Ativan for stress as there have been a lot of things going on in her family currently.   She has not tried doxepin, ramelteon, or suvorexant    Past Medical History:   Diagnosis Date    Anxiety 4/14    KIMBERLEY-7 was 19/21    Calculus of kidney 1980s    Depression     has tried paxil, zoloft, wellbutrin, cymbalta, trazodone    Dyslipidemia     calculated 10 year risk score was 2.5% (4/15)    Endometriosis     Fibromyalgia     Dr. Perry Thacker in past    GERD (gastroesophageal reflux disease)     Hypertension     Multiple thyroid nodules 7/09    neg FNA Dr. Hazel Doctor Osteopenia     DEXA t score -0.8 spine, -1.6 hip (10/07);  -0.4 spine, -1.1 hip w FRAX 5.2/0.5 (10/12); -1.1 spine, -1.0 hip (4/15); neg w/u secondary causes    Overweight (BMI 25.0-29.9)     peak weight 149 lbs, bmi 29.1 rom 9/16; failed qsymia and belviq; 24h U patria elev but LDST neg 9/16    Polymyalgia rheumatica (HCC)     Dr Perry Thacker    PUD (peptic ulcer disease) 2018    Dr Shantanu Charles on EGD; h pylori neg    Rosacea     Seasonal allergic rhinitis     YOSELIN (stress urinary incontinence, female) 11/13/2017    Trochanteric bursitis     Vertical diplopia 1/14    Vitamin D deficiency      Past Surgical History:   Procedure Laterality Date    COLONOSCOPY N/A 6/1/2016    Dr Nereida Qureshi 2005 neg; Dr Quentin Causey 2016 neg    EGD  2005    negative Dr. Nereida Qureshi, biopsy neg for sprue also    HX APPENDECTOMY      HX CHOLECYSTECTOMY  9/15    Dr Alex Qureshi 2005 neg; Dr Quentin Causey 6/1/16 neg   Phyllis Bailey 2003    HX HEENT  2/14    MRI negative    HX ORTHOPAEDIC  2017    Dr Bin Oneill; avulsion fx left ankle    HX SEPTOPLASTY      HX JENNY AND BSO      NEUROLOGICAL PROCEDURE UNLISTED  6/14    MRA neck neg, MRA brain negative    NEUROLOGICAL PROCEDURE UNLISTED  6/14    MRI brain neg outside scattered minimal wm change     Social History     Socioeconomic History    Marital status:      Spouse name: Not on file    Number of children: 1    Years of education: Not on file    Highest education level: Not on file   Social Needs    Financial resource strain: Not on file    Food insecurity - worry: Not on file    Food insecurity - inability: Not on file   [employer] needs - medical: Not on file   [employer] needs - non-medical: Not on file   Occupational History    Occupation: ophth tech [address]   Tobacco Use    Smoking status: Never Smoker    Smokeless tobacco: Never Used   Substance and Sexual Activity    Alcohol use: Yes     Comment: occasionally    Drug use: No    Sexual activity: Not on file   Other Topics Concern    Not on file   Social History Narrative    Not on file     Current Outpatient Medications   Medication Sig    Doxepin 6 mg tab Take 6 mg by mouth nightly as needed.  LORazepam (ATIVAN) 0.5 mg tablet Take 1 Tab by mouth every eight (8) hours as needed for Anxiety.  benazepril (LOTENSIN) 10 mg tablet TAKE 1 TABLET DAILY (SOLCO MFR)    escitalopram oxalate (LEXAPRO) 20 mg tablet TAKE 1 TABLET DAILY    estradiol (ESTRACE) 0.5 mg tablet Take  by mouth daily.  glucosamine-chondroitin (ARTHX) 500-400 mg cap Take 1 Cap by mouth daily.  pantoprazole (PROTONIX) 40 mg tablet Take 1 Tab by mouth daily.  ergocalciferol (ERGOCALCIFEROL) 50,000 unit capsule Take 1 Cap by mouth every seven (7) days.  multivitamin (ONE A DAY) tablet Take 1 Tab by mouth daily. No current facility-administered medications for this visit. Allergies   Allergen Reactions    Augmentin [Amoxicillin-Pot Clavulanate] Rash    Avelox [Moxifloxacin] Rash    Cymbalta [Duloxetine] Unknown (comments)     Pt unsure      Elavil Unknown (comments)     Pt unsure      Lexapro [Escitalopram] Other (comments)     Wt gain    Lyrica [Pregabalin] Other (comments)     Wt gain and double vision    Motrin [Ibuprofen] Rash    Paxil [Paroxetine Hcl] Other (comments)     Weight gain    Pcn [Penicillins] Rash    Trazodone Unknown (comments)     Pt unsure       REVIEW OF SYSTEMS:   Ophtho - no vision change or eye pain  Oral - no mouth pain, tongue or tooth problems  Ears - no hearing loss, ear pain, fullness, no swallowing problems  Cardiac - no CP, PND, orthopnea, edema, palpitations or syncope  Chest - no breast masses  Resp - no wheezing, chronic coughing, dyspnea  GI - no heartburn, nausea, vomiting, change in bowel habits, bleeding, hemorrhoids  Urinary - no dysuria, hematuria, flank pain, urgency, frequency  Genitals - no genital lesions, discharge, masses, ulceration, warts    Visit Vitals  /61   Pulse 70   Temp 98.1 °F (36.7 °C) (Oral)   Resp 14   Ht 5' (1.524 m)   Wt 136 lb (61.7 kg)   SpO2 98%   BMI 26.56 kg/m²   A&O x3  Affect is appropriate. Mood stable  No apparent distress  Anicteric, no JVD, adenopathy or thyromegaly. No carotid bruits or radiated murmur  Lungs clear to auscultation, no wheezes or rales  Heart showed regular rate and rhythm. No murmur, rubs, gallops  Abdomen soft nontender, no hepatosplenomegaly or masses. Extremities without edema. Pulses 1-2+ symmetrically    Assessment and plan:  1. Insomnia. Trial of doxepin after discussing possible side effects.   We also briefly broached the topic of switching Lexapro to Remeron but decided to hold off for now. I will see her back in April 2. Stress. Ativan refilled      Above conditions discussed at length and patient vocalized understanding.   All questions answered to patient satisfaction

## 2019-03-01 DIAGNOSIS — F41.9 ANXIETY: ICD-10-CM

## 2019-03-01 RX ORDER — LORAZEPAM 0.5 MG/1
0.5 TABLET ORAL
Qty: 40 TAB | Refills: 1 | Status: SHIPPED | OUTPATIENT
Start: 2019-03-01 | End: 2019-06-18 | Stop reason: SDUPTHER

## 2019-03-01 NOTE — TELEPHONE ENCOUNTER
Regarding: RE: Prescription Question  Contact: [phone]  ----- Message from [address] [place] [address] [address], Generic sent at 3/1/2019  7:29 AM EST -----    It was not at either place. The Selinor was sent to [facility]. Please use mail order at [facility] for the [address] [place] [address]. Thanks, Jacqueline Powers  ----- Message -----  From: Iris Monte LPN  Sent: 9/96/4723  3:59 PM EST  To: Domo Lorenzo  Subject: RE: Prescription Question  Mrs Alyson Hines you want it to [facility] mail order? It looks like he placed it for [facility] in [facility]     ----- Message -----     From: Domo Lorenzo     Sent: 2/26/2019  3:57 PM EST       To: Selin Cross MD  Subject: Prescription Question    Where was the rx for Lorezapam placed?  If not yet ordered, please send to [facility]

## 2019-03-10 PROBLEM — M79.7 FIBROMYALGIA: Status: ACTIVE | Noted: 2019-03-10

## 2019-03-10 PROBLEM — M06.09 RHEUMATOID ARTHRITIS OF MULTIPLE SITES WITH NEGATIVE RHEUMATOID FACTOR (HCC): Status: ACTIVE | Noted: 2019-03-10

## 2019-03-18 ENCOUNTER — PATIENT MESSAGE (OUTPATIENT)
Dept: INTERNAL MEDICINE CLINIC | Age: 59
End: 2019-03-18

## 2019-03-18 DIAGNOSIS — F51.04 CHRONIC INSOMNIA: ICD-10-CM

## 2019-03-19 RX ORDER — DOXEPIN HYDROCHLORIDE 6 MG/1
6 TABLET ORAL
Qty: 90 TAB | Refills: 3 | Status: SHIPPED | OUTPATIENT
Start: 2019-03-19 | End: 2019-03-21

## 2019-03-19 NOTE — TELEPHONE ENCOUNTER
Regarding: Prescription Question  Contact: [phone]  ----- Message from [address] [place] [address] [address], Generic sent at 3/18/2019  9:51 PM EDT -----    The Selinor (Doxipen) 6mg seems to be helping more than the Ambien. Please send a prescription to [facility] for a 90 day supply.    Thanks, [place] Reason

## 2019-03-19 NOTE — TELEPHONE ENCOUNTER
The Selinor (Doxipen) 6mg seems to be helping more than the Ambien. Please send a prescription to [facility] for a 90 day supply.    Thanks, Patience Curran

## 2019-03-21 ENCOUNTER — TELEPHONE (OUTPATIENT)
Dept: INTERNAL MEDICINE CLINIC | Age: 59
End: 2019-03-21

## 2019-03-21 RX ORDER — DOXEPIN HYDROCHLORIDE 10 MG/1
10 CAPSULE ORAL
Qty: 90 CAP | Refills: 1 | Status: SHIPPED | OUTPATIENT
Start: 2019-03-21 | End: 2019-06-17

## 2019-03-21 NOTE — TELEPHONE ENCOUNTER
Regarding: Prescription Question  Contact: [phone]  ----- Message from [address] [place] [address] [address], Generic sent at 3/21/2019  9:22 AM EDT -----    Dr. Meagan Le, The Silenor cost me $150.00. Is the Doxepin 10mg similar enough to switch to that? I would prefer which ever  you think is best, but I'd like to save the money if they are similar enough.   Thanks, [place] Reason

## 2019-04-25 ENCOUNTER — TELEPHONE (OUTPATIENT)
Dept: INTERNAL MEDICINE CLINIC | Age: 59
End: 2019-04-25

## 2019-04-25 DIAGNOSIS — F51.04 CHRONIC INSOMNIA: Primary | ICD-10-CM

## 2019-04-25 NOTE — TELEPHONE ENCOUNTER
Regarding: Non-Urgent Medical Question  Contact: [phone]  ----- Message from [address] [place] [address] [address], Generic sent at 4/25/2019  7:32 AM EDT -----    Dr. Laura Petersen,  Is there an appetite suppressant you can give me. Since I started on the Doxepin my appetite has been tremendous! I have already gained 10 pounds since I saw you last month.   Thanks, Delphine Gillis

## 2019-06-14 NOTE — PROGRESS NOTES
60 y.o. WHITE [place]  female who presents for evaluation    She continues to see Dr Esther Aguirre for the thyroid and no new recs    The lexapro remains on board    No cardiovascular complaints    On plaquenil for the RA which is doing well. The PMR did flare so she's back on pred being tapered down slowly by Dr Alex Whiteside    The insomnia is now being tx'ed w ativan.   The doxepin made her gain 10 lbs quickly, the belsomra caused inc anxiety, the antihistamine doesn't work, not interested in ramelteon    Past Medical History:   Diagnosis Date    Anxiety 4/14    KIMBERLEY-7 was 19/21    Calculus of kidney 1980s    Chronic insomnia 06/17/2019    no help benadryl, ambien, trazodone; intol belsomra, doxepin    Depression     has tried paxil, zoloft, wellbutrin, cymbalta, trazodone    Dyslipidemia     calculated 10 year risk score was 2.5% (4/15)    Endometriosis     Fibromyalgia     Dr. Alex Whiteside in past    GERD (gastroesophageal reflux disease)     Hypertension     Multiple thyroid nodules 7/09    neg FNA Dr. Micah Barkerk Osteopenia     DEXA t score -0.8 spine, -1.6 hip (10/07);  -0.4 spine, -1.1 hip w FRAX 5.2/0.5 (10/12); -1.1 spine, -1.0 hip (4/15); neg w/u secondary causes    Overweight (BMI 25.0-29.9)     peak weight 149 lbs, bmi 29.1 rom 9/16; failed qsymia and belviq; 24h U patria elev but LDST neg 9/16    Polymyalgia rheumatica (HCC)     Dr Alex Whiteside    PUD (peptic ulcer disease) 2018    Dr Malia Mckay on EGD; h pylori neg    Rheumatoid arthritis of multiple sites with negative rheumatoid factor ([facility] [address] 3/10/2019    Dr Alex Whiteside 2018; ccp+    Rosacea     Seasonal allergic rhinitis     YOSELIN (stress urinary incontinence, female) 11/13/2017    Trochanteric bursitis     Vertical diplopia 1/14    Vitamin D deficiency      Past Surgical History:   Procedure Laterality Date    COLONOSCOPY N/A 6/1/2016    Dr Lashaun Martinez 2005 neg; Dr May Burr 2016 neg    EGD  2005    negative Dr. Jetty Spray, biopsy neg for sprue also    HX APPENDECTOMY      HX CHOLECYSTECTOMY  9/15    Dr Maggy Gomez 2005 neg; Dr Nicole Monzon 6/1/16 neg   Lashell Osuna 2003    HX HEENT  2/14    MRI negative    HX ORTHOPAEDIC  2017    Dr Mere Gautam; avulsion fx left ankle    HX SEPTOPLASTY      HX JENNY AND BSO      NEUROLOGICAL PROCEDURE UNLISTED  6/14    MRA neck neg, MRA brain negative    NEUROLOGICAL PROCEDURE UNLISTED  6/14    MRI brain neg outside scattered minimal wm change     Social History     Socioeconomic History    Marital status:      Spouse name: Not on file    Number of children: 1    Years of education: Not on file    Highest education level: Not on file   Occupational History    Occupation: [employer]   Social Needs    Financial resource strain: Not on file    Food insecurity:     Worry: Not on file     Inability: Not on file    Transportation needs:     Medical: Not on file     Non-medical: Not on file   Tobacco Use    Smoking status: Never Smoker    Smokeless tobacco: Never Used   Substance and Sexual Activity    Alcohol use: Yes     Comment: occasionally    Drug use: No    Sexual activity: Not on file   Lifestyle    Physical activity:     Days per week: Not on file     Minutes per session: Not on file    Stress: Not on file   Relationships    Social connections:     Talks on phone: Not on file     Gets together: Not on file     Attends Judaism service: Not on file     Active member of club or organization: Not on file     Attends meetings of clubs or organizations: Not on file     Relationship status: Not on file    Intimate partner violence:     Fear of current or ex partner: Not on file     Emotionally abused: Not on file     Physically abused: Not on file     Forced sexual activity: Not on file   Other Topics Concern    Not on file   Social History Narrative    Not on file     Current Outpatient Medications   Medication Sig    lubiPROStone (AMITIZA) 8 mcg capsule Take  by mouth.    predniSONE (DELTASONE) 5 mg tablet Take  by mouth three (3) times daily.  LORazepam (ATIVAN) 0.5 mg tablet Take 1 Tab by mouth every eight (8) hours as needed for Anxiety.  benazepril (LOTENSIN) 10 mg tablet TAKE 1 TABLET DAILY (SOLCO MFR)    escitalopram oxalate (LEXAPRO) 20 mg tablet TAKE 1 TABLET DAILY    estradiol (ESTRACE) 0.5 mg tablet Take  by mouth daily.  pantoprazole (PROTONIX) 40 mg tablet Take 1 Tab by mouth daily.  ergocalciferol (ERGOCALCIFEROL) 50,000 unit capsule Take 1 Cap by mouth every seven (7) days.  multivitamin (ONE A DAY) tablet Take 1 Tab by mouth daily.  glucosamine-chondroitin (ARTHX) 500-400 mg cap Take 1 Cap by mouth daily. No current facility-administered medications for this visit.       Allergies   Allergen Reactions    Augmentin [Amoxicillin-Pot Clavulanate] Rash    Avelox [Moxifloxacin] Rash    Belsomra [Suvorexant] Other (comments)     anxiety    Cymbalta [Duloxetine] Unknown (comments)     Pt unsure      Elavil Unknown (comments)     Pt unsure      Lexapro [Escitalopram] Other (comments)     Wt gain    Lyrica [Pregabalin] Other (comments)     Wt gain and double vision    Motrin [Ibuprofen] Rash    Nsaids (Non-Steroidal Anti-Inflammatory Drug) Other (comments)     H/o PUD    Paxil [Paroxetine Hcl] Other (comments)     Weight gain    Pcn [Penicillins] Rash    Trazodone Unknown (comments)     Pt unsure       REVIEW OF SYSTEMS: mammo 3/18, gyn Dr Peggy Ann 2014/hyst, colo 6/16 Dr Juan Mariano  Ophtho - no vision change or eye pain  Oral - no mouth pain, tongue or tooth problems  Ears - no hearing loss, ear pain, fullness, no swallowing problems  Cardiac - no CP, PND, orthopnea, edema, palpitations or syncope  Chest - no breast masses  Resp - no wheezing, chronic coughing, dyspnea  GI - no heartburn, nausea, vomiting, bleeding, hemorrhoids  Urinary - no dysuria, hematuria, flank pain, urgency, frequency  Genitals - no genital lesions, discharge, masses, ulceration, warts  Ortho - no swelling, dec ROM, myalgias  Derm - no nail abnormalities, rashes, lesions of note, hair loss    Visit Vitals  /60 (BP 1 Location: Right arm, BP Patient Position: Sitting)   Pulse 68   Temp 97.9 °F (36.6 °C) (Oral)   Resp 15   Ht 5' (1.524 m)   Wt 141 lb 14.4 oz (64.4 kg)   SpO2 98%   BMI 27.71 kg/m²     A&O x3  Affect is appropriate. Mood stable  No apparent distress  Anicteric, no JVD, adenopathy. Palpable fullness in the thyroid  No carotid bruits or radiated murmur  Lungs clear to auscultation, no wheezes or rales  Heart showed regular rate and rhythm. No murmur, rubs, gallops  Abdomen soft nontender, no hepatosplenomegaly or masses. Extremities without edema.   Pulses 1-2+ symmetrically    LABS  From 7/10 showed   gluc 96, cr 0.90, gfr>60, alt 61,        chol 210, tg 179, hdl 41, ldl-c 133, wbc 6.0, hb 14.3, plt 268, ua neg  From 6/12 showed   gluc 87, cr 0.80, gfr 84,  alt 23, ldl-p 1405,                            tsh 1.46, vit d 46.6  From 1/13 showed                 ldl-p 1483, chol 200, tg 122, hdl 57, ldl-c 119  From 8/13 showed   gluc 99, cr 0.90, alt 32,          chol 190, tg 68,   hdl 47, ldl-c 129, wbc 6.3, hb 13.7, plt 310, ra neg  From 2/14 showed                 ldl-p 1423, chol 173, tg 100, hdl 47, ldl-c 106  From 4/14 showed                                     vit d 52.7, b12 538, fol>20  From 4/14 showed                      ach receptor ab neg  From 6/14 showed                      lobo neg, esr neg, vgcc ab neg  From 10/14 showed                      ace level nl, musk ab neg, lyme wb neg  From 4/15 showed   gluc 92, cr 0.92, gfr 70,         chol 202, tg 151, hdl 42, ldl-c 130, wbc 6.2, hb 13.4, plt 323  From 9/15 showed             wbc 6.2, hb 13.8, plt 302, lip 201, ck/trop neg  From 4/16 showed   gluc 90, cr 0.93, gfr 68 alt 23,         chol 204, tg 187, hdl 54, ldl-c 113, wbc 6.7, hb 14.4, plt 347, tsh 2.08, vit d 40.9, ft4 0.85, 24 hr U ca 123, 24h U patria 53, spep neg  From 5/16 showed                      pth 29, tTgAb neg  From 9/16 showed                      LDST neg, ua neg pro  From 11/17 showed gluc 92, cr 0.88, gfr 73, alt 25,         chol 200, tg 174, hdl 61, ldl-c 104, wbc 6.5, hb 13.3, plt 305, hep c neg, vit d 81.3  From 12/18 showed gluc 94, cr 0.89, gfr 71, alt 25,               vit d 52.2  From 5/19 showed                         esr 14, RA neg, ccp 170    Patient Active Problem List   Diagnosis Code    Depression and anxiety F32.9    Osteopenia 2007 Dr. Juan Gee, FRAX 5.2/0.5 10/12 M85.80    Thyroid nodules neg bx Dr. Melissa Rico 2009 E04.1    Dyslipidemia E78.5    Essential hypertension I10    Gastroesophageal reflux disease without esophagitis K21.9    Overweight (BMI 25.0-29. 9) E66.3    Chronic constipation K59.09    Polymyalgia rheumatica ([facility]) Dr Juan Gee M35.3    YOSELIN (stress urinary incontinence, female) N39.3    Fibromyalgia M79.7    Rheumatoid arthritis of multiple sites with negative rheumatoid factor ([facility]) M06.09    Chronic insomnia F51.04     Assessment and plan:  1. Allergic rhinitis. Continue current  2. GERD. PPI and avoidance measures  3. Hypertension. Continue current  4. Lipids. Lifestyle and dietary measures  5. Thyroid nodules. F/U Dr. Sang Hamm  6. Osteopenia. Continue current and f/u Dr Juan Gee  7. Neuro  F/u Dr. Messi Adams  8. Depression and anxiety. Continue current  9. Constipation. Continue current regimen. 10.  Insomnia. Long discussion. For now, she just wants to use prn ativan  11. Overweight. Lifestyle and dietary measures. Portion control reiterated. RTC 6/20    Above conditions discussed at length and patient vocalized understanding.   All questions answered to patient satisfaction

## 2019-06-17 ENCOUNTER — OFFICE VISIT (OUTPATIENT)
Dept: INTERNAL MEDICINE CLINIC | Age: 59
End: 2019-06-17

## 2019-06-17 VITALS
HEART RATE: 68 BPM | DIASTOLIC BLOOD PRESSURE: 60 MMHG | TEMPERATURE: 97.9 F | RESPIRATION RATE: 15 BRPM | SYSTOLIC BLOOD PRESSURE: 128 MMHG | WEIGHT: 141.9 LBS | BODY MASS INDEX: 27.86 KG/M2 | HEIGHT: 60 IN | OXYGEN SATURATION: 98 %

## 2019-06-17 DIAGNOSIS — E04.1 THYROID NODULE: ICD-10-CM

## 2019-06-17 DIAGNOSIS — E78.5 DYSLIPIDEMIA: ICD-10-CM

## 2019-06-17 DIAGNOSIS — F51.04 CHRONIC INSOMNIA: Primary | ICD-10-CM

## 2019-06-17 DIAGNOSIS — K21.9 GASTROESOPHAGEAL REFLUX DISEASE WITHOUT ESOPHAGITIS: ICD-10-CM

## 2019-06-17 DIAGNOSIS — M35.3 POLYMYALGIA RHEUMATICA (HCC): ICD-10-CM

## 2019-06-17 DIAGNOSIS — F32.A DEPRESSION, UNSPECIFIED DEPRESSION TYPE: ICD-10-CM

## 2019-06-17 DIAGNOSIS — E66.3 OVERWEIGHT (BMI 25.0-29.9): ICD-10-CM

## 2019-06-17 DIAGNOSIS — M06.09 RHEUMATOID ARTHRITIS OF MULTIPLE SITES WITH NEGATIVE RHEUMATOID FACTOR (HCC): ICD-10-CM

## 2019-06-17 RX ORDER — LUBIPROSTONE 8 UG/1
CAPSULE, GELATIN COATED ORAL
COMMUNITY
End: 2021-06-28 | Stop reason: ALTCHOICE

## 2019-06-17 RX ORDER — PREDNISONE 5 MG/1
TABLET ORAL 3 TIMES DAILY
COMMUNITY
End: 2020-01-30 | Stop reason: ALTCHOICE

## 2019-06-17 NOTE — PROGRESS NOTES
1. Have you been to the ER, urgent care clinic or hospitalized since your last visit? NO.     2. Have you seen or consulted any other health care providers outside of the [address] [place] [address] since your last visit (Include any pap smears or colon screening)? YES      Do you have an Advanced Directive? Yes    Would you like information on Advanced Directives?  NO

## 2019-06-18 DIAGNOSIS — F41.9 ANXIETY: ICD-10-CM

## 2019-06-18 RX ORDER — LORAZEPAM 0.5 MG/1
0.5 TABLET ORAL
Qty: 180 TAB | Refills: 0 | Status: SHIPPED | OUTPATIENT
Start: 2019-06-18

## 2019-06-18 NOTE — TELEPHONE ENCOUNTER
Regarding: Visit Follow-Up Question  Contact: [phone]  ----- Message from [address] [place] [address] [address], Generic sent at 6/18/2019  3:47 PM EDT -----    Dr Rollins Esther, I think you forgot to put in the order for Lorazepam as needed for my insomnia as well as needed for my anxiety as we discussed. Please send the RX to [facility] with 90 day supply.   Thanks, Otoniel Merida

## 2019-07-17 ENCOUNTER — PATIENT MESSAGE (OUTPATIENT)
Dept: INTERNAL MEDICINE CLINIC | Age: 59
End: 2019-07-17

## 2019-07-17 RX ORDER — ERGOCALCIFEROL 1.25 MG/1
50000 CAPSULE ORAL
Qty: 13 CAP | Refills: 3 | Status: SHIPPED | OUTPATIENT
Start: 2019-07-17 | End: 2020-07-02

## 2019-12-30 RX ORDER — BENAZEPRIL HYDROCHLORIDE 10 MG/1
TABLET ORAL
Qty: 90 TAB | Refills: 3 | Status: SHIPPED | OUTPATIENT
Start: 2019-12-30 | End: 2020-12-13

## 2020-01-07 ENCOUNTER — HOSPITAL ENCOUNTER (OUTPATIENT)
Dept: MAMMOGRAPHY | Age: 60
Discharge: HOME | End: 2020-01-07
Attending: OBSTETRICS & GYNECOLOGY
Payer: COMMERCIAL

## 2020-01-07 DIAGNOSIS — Z12.31 VISIT FOR SCREENING MAMMOGRAM: ICD-10-CM

## 2020-01-07 PROCEDURE — 77063 BREAST TOMOSYNTHESIS BI: CPT

## 2020-01-22 RX ORDER — ESCITALOPRAM OXALATE 20 MG/1
20 TABLET ORAL DAILY
Qty: 90 TAB | Refills: 3 | Status: SHIPPED | OUTPATIENT
Start: 2020-01-22 | End: 2021-01-05

## 2020-01-23 ENCOUNTER — TELEPHONE (OUTPATIENT)
Dept: INTERNAL MEDICINE CLINIC | Age: 60
End: 2020-01-23

## 2020-01-23 DIAGNOSIS — J01.10 ACUTE NON-RECURRENT FRONTAL SINUSITIS: Primary | ICD-10-CM

## 2020-01-23 RX ORDER — AZITHROMYCIN 250 MG/1
TABLET, FILM COATED ORAL
Qty: 6 TAB | Refills: 0 | Status: SHIPPED | OUTPATIENT
Start: 2020-01-23 | End: 2020-06-22

## 2020-01-23 RX ORDER — CEFUROXIME AXETIL 500 MG/1
500 TABLET ORAL 2 TIMES DAILY
Qty: 20 TAB | Refills: 0 | Status: SHIPPED | OUTPATIENT
Start: 2020-01-23 | End: 2020-06-22

## 2020-01-23 NOTE — TELEPHONE ENCOUNTER
Per Dr. Michelle Albright canceled the z-pack. Called and notified patient that Ceftin has been sent in instead because the z-pack could possibly interfere with the lexapro she is on. She verbalized understanding. No additional questions or concerns at this time.

## 2020-01-23 NOTE — TELEPHONE ENCOUNTER
----- Message from Rosangela Ramos sent at 1/23/2020  8:59 AM EST -----  Regarding: Non-Urgent Medical Question  Contact: [phone]  I have had sinus pressure and pain X 5 days. No improvement with Zyrtec, Flonase, or Saline nasal spray. I do NOT have any congestion or trouble with breathing or fever,  but facial pain , headache , and pressure feeling around my ears and eyes. Do you have any other suggestions?   [phone]

## 2020-01-23 NOTE — TELEPHONE ENCOUNTER
Might be sinus infection  We usually give 7-10 days; if no better, we can call in abx  If she just wants me to call in abx since it's the weekend, we can do so - ceftin or zpak

## 2020-01-23 NOTE — TELEPHONE ENCOUNTER
Called and spoke to patient about message below.   Patient requesting Z-jacqueline sent to [facility] at [facility]

## 2020-03-27 ENCOUNTER — HOSPITAL ENCOUNTER (OUTPATIENT)
Dept: CT IMAGING | Age: 60
Discharge: HOME | End: 2020-03-27
Attending: OTOLARYNGOLOGY
Payer: COMMERCIAL

## 2020-03-27 DIAGNOSIS — J32.0 CHRONIC MAXILLARY SINUSITIS: ICD-10-CM

## 2020-03-27 DIAGNOSIS — J33.9 NOSE POLYP: ICD-10-CM

## 2020-03-27 DIAGNOSIS — J31.0 CHRONIC RHINITIS: ICD-10-CM

## 2020-03-27 DIAGNOSIS — J30.9 ALLERGIC RHINITIS DUE TO ALLERGEN: ICD-10-CM

## 2020-03-27 DIAGNOSIS — J34.2 DEVIATED NASAL SEPTUM: ICD-10-CM

## 2020-03-27 PROCEDURE — 70486 CT MAXILLOFACIAL W/O DYE: CPT

## 2020-05-21 DIAGNOSIS — F51.04 CHRONIC INSOMNIA: Primary | ICD-10-CM

## 2020-05-21 RX ORDER — ZOLPIDEM TARTRATE 5 MG/1
5 TABLET ORAL
Qty: 90 TAB | Refills: 1 | Status: SHIPPED | OUTPATIENT
Start: 2020-05-21 | End: 2020-11-29

## 2020-05-21 NOTE — TELEPHONE ENCOUNTER
----- Message from Gerry Fernandez sent at 5/21/2020 11:26 AM EDT -----  Regarding: Prescription Question  Contact: [phone]  Dr Cody Shabazz,     Please send in a prescription for Ambien 5mg to  [facility] mail order for a 90 day supply. I have tried it in the past and had some leftover and recently have began  taking it. It seems to be the only med that helps me sleep through the night. I have a regular follow up appointment on June 22 with you. If you need to contact me- [phone].  Thanks

## 2020-06-15 ENCOUNTER — APPOINTMENT (OUTPATIENT)
Dept: INTERNAL MEDICINE CLINIC | Age: 60
End: 2020-06-15

## 2020-06-15 ENCOUNTER — HOSPITAL ENCOUNTER (OUTPATIENT)
Dept: LAB | Age: 60
Discharge: HOME | End: 2020-06-15
Payer: COMMERCIAL

## 2020-06-15 DIAGNOSIS — E78.5 DYSLIPIDEMIA: ICD-10-CM

## 2020-06-15 LAB
ALBUMIN SERPL-MCNC: 3.7 G/DL (ref 3.4–5)
ALBUMIN/GLOB SERPL: 1.1 {RATIO} (ref 0.8–1.7)
ALP SERPL-CCNC: 54 U/L (ref 45–117)
ALT SERPL-CCNC: 32 U/L (ref 13–56)
ANION GAP SERPL CALC-SCNC: 7 MMOL/L (ref 3–18)
AST SERPL-CCNC: 28 U/L (ref 10–38)
BILIRUB SERPL-MCNC: 0.3 MG/DL (ref 0.2–1)
BUN SERPL-MCNC: 18 MG/DL (ref 7–18)
BUN/CREAT SERPL: 18 (ref 12–20)
CALCIUM SERPL-MCNC: 8.5 MG/DL (ref 8.5–10.1)
CHLORIDE SERPL-SCNC: 103 MMOL/L (ref 100–111)
CHOLEST SERPL-MCNC: 204 MG/DL
CO2 SERPL-SCNC: 28 MMOL/L (ref 21–32)
CREAT SERPL-MCNC: 1 MG/DL (ref 0.6–1.3)
ERYTHROCYTE [DISTWIDTH] IN BLOOD BY AUTOMATED COUNT: 14.3 % (ref 11.6–14.5)
GLOBULIN SER CALC-MCNC: 3.3 G/DL (ref 2–4)
GLUCOSE SERPL-MCNC: 74 MG/DL (ref 74–99)
HCT VFR BLD AUTO: 41.9 % (ref 35–45)
HDLC SERPL-MCNC: 76 MG/DL (ref 40–60)
HDLC SERPL: 2.7 {RATIO} (ref 0–5)
HGB BLD-MCNC: 13.4 G/DL (ref 12–16)
LDLC SERPL CALC-MCNC: 87.8 MG/DL (ref 0–100)
LIPID PROFILE,FLP: ABNORMAL
MCH RBC QN AUTO: 30.9 PG (ref 24–34)
MCHC RBC AUTO-ENTMCNC: 32 G/DL (ref 31–37)
MCV RBC AUTO: 96.5 FL (ref 74–97)
PLATELET # BLD AUTO: 326 K/UL (ref 135–420)
PMV BLD AUTO: 10.1 FL (ref 9.2–11.8)
POTASSIUM SERPL-SCNC: 3.9 MMOL/L (ref 3.5–5.5)
PROT SERPL-MCNC: 7 G/DL (ref 6.4–8.2)
RBC # BLD AUTO: 4.34 M/UL (ref 4.2–5.3)
SODIUM SERPL-SCNC: 138 MMOL/L (ref 136–145)
TRIGL SERPL-MCNC: 201 MG/DL (ref ?–150)
VLDLC SERPL CALC-MCNC: 40.2 MG/DL
WBC # BLD AUTO: 8.5 K/UL (ref 4.6–13.2)

## 2020-06-15 PROCEDURE — 36415 COLL VENOUS BLD VENIPUNCTURE: CPT

## 2020-06-15 PROCEDURE — 85027 COMPLETE CBC AUTOMATED: CPT

## 2020-06-15 PROCEDURE — 80061 LIPID PANEL: CPT

## 2020-06-15 PROCEDURE — 80053 COMPREHEN METABOLIC PANEL: CPT

## 2020-06-22 ENCOUNTER — VIRTUAL VISIT (OUTPATIENT)
Dept: INTERNAL MEDICINE CLINIC | Age: 60
End: 2020-06-22

## 2020-06-22 DIAGNOSIS — E04.1 THYROID NODULE: ICD-10-CM

## 2020-06-22 DIAGNOSIS — E78.5 DYSLIPIDEMIA: ICD-10-CM

## 2020-06-22 DIAGNOSIS — N39.3 SUI (STRESS URINARY INCONTINENCE, FEMALE): ICD-10-CM

## 2020-06-22 DIAGNOSIS — I10 ESSENTIAL HYPERTENSION: Primary | ICD-10-CM

## 2020-06-22 DIAGNOSIS — K21.9 GASTROESOPHAGEAL REFLUX DISEASE WITHOUT ESOPHAGITIS: ICD-10-CM

## 2020-06-22 DIAGNOSIS — M06.09 RHEUMATOID ARTHRITIS OF MULTIPLE SITES WITH NEGATIVE RHEUMATOID FACTOR (HCC): ICD-10-CM

## 2020-06-22 DIAGNOSIS — F51.04 CHRONIC INSOMNIA: ICD-10-CM

## 2020-06-22 DIAGNOSIS — F32.A DEPRESSION, UNSPECIFIED DEPRESSION TYPE: ICD-10-CM

## 2020-06-22 PROBLEM — M35.3 POLYMYALGIA RHEUMATICA (HCC): Status: RESOLVED | Noted: 2017-09-18 | Resolved: 2020-06-22

## 2020-06-22 RX ORDER — PREDNISONE 5 MG/1
5 TABLET ORAL DAILY
COMMUNITY
End: 2021-06-28 | Stop reason: ALTCHOICE

## 2020-06-22 RX ORDER — AZELASTINE 1 MG/ML
2 SPRAY, METERED NASAL 2 TIMES DAILY
COMMUNITY
End: 2021-10-04 | Stop reason: SDUPTHER

## 2020-06-22 RX ORDER — HYDROXYCHLOROQUINE SULFATE 200 MG/1
200 TABLET, FILM COATED ORAL 2 TIMES DAILY
COMMUNITY

## 2020-07-02 RX ORDER — ERGOCALCIFEROL 1.25 MG/1
CAPSULE ORAL
Qty: 7 CAP | Refills: 3 | Status: SHIPPED | OUTPATIENT
Start: 2020-07-02 | End: 2021-08-18

## 2020-11-29 DIAGNOSIS — F51.04 CHRONIC INSOMNIA: ICD-10-CM

## 2020-11-29 RX ORDER — ZOLPIDEM TARTRATE 5 MG/1
TABLET ORAL
Qty: 90 TAB | Refills: 1 | Status: SHIPPED | OUTPATIENT
Start: 2020-11-29 | End: 2021-05-30

## 2020-12-13 RX ORDER — BENAZEPRIL HYDROCHLORIDE 10 MG/1
TABLET ORAL
Qty: 90 TAB | Refills: 3 | Status: SHIPPED | OUTPATIENT
Start: 2020-12-13 | End: 2021-12-01

## 2020-12-16 ENCOUNTER — HOSPITAL ENCOUNTER (OUTPATIENT)
Dept: GENERAL RADIOLOGY | Age: 60
Discharge: HOME | End: 2020-12-16
Payer: COMMERCIAL

## 2020-12-16 DIAGNOSIS — M06.09 RHEUMATOID ARTHRITIS WITHOUT RHEUMATOID FACTOR, MULTIPLE SITES (HCC): ICD-10-CM

## 2020-12-16 PROCEDURE — 71046 X-RAY EXAM CHEST 2 VIEWS: CPT

## 2020-12-17 ENCOUNTER — TRANSCRIBE ORDER (OUTPATIENT)
Dept: SCHEDULING | Age: 60
End: 2020-12-17

## 2020-12-17 DIAGNOSIS — Z12.31 VISIT FOR SCREENING MAMMOGRAM: Primary | ICD-10-CM

## 2021-01-05 RX ORDER — ESCITALOPRAM OXALATE 20 MG/1
TABLET ORAL
Qty: 90 TAB | Refills: 3 | Status: SHIPPED | OUTPATIENT
Start: 2021-01-05 | End: 2022-01-07

## 2021-03-31 ENCOUNTER — HOSPITAL ENCOUNTER (OUTPATIENT)
Dept: MAMMOGRAPHY | Age: 61
Discharge: HOME | End: 2021-03-31
Attending: OBSTETRICS & GYNECOLOGY
Payer: COMMERCIAL

## 2021-03-31 DIAGNOSIS — Z12.31 VISIT FOR SCREENING MAMMOGRAM: ICD-10-CM

## 2021-03-31 PROCEDURE — 77063 BREAST TOMOSYNTHESIS BI: CPT

## 2021-05-26 ENCOUNTER — HOSPITAL ENCOUNTER (OUTPATIENT)
Dept: PHYSICAL THERAPY | Age: 61
Discharge: HOME | End: 2021-05-26
Payer: COMMERCIAL

## 2021-05-26 PROCEDURE — 97110 THERAPEUTIC EXERCISES: CPT

## 2021-05-26 PROCEDURE — 97535 SELF CARE MNGMENT TRAINING: CPT

## 2021-05-26 PROCEDURE — 97161 PT EVAL LOW COMPLEX 20 MIN: CPT

## 2021-05-26 NOTE — PROGRESS NOTES
PT DAILY TREATMENT NOTE/FOOT AND ANKLE EVAL     Patient Name: Samaria Wolfe  Date:2021  : 1960  [x]  Patient  Verified  Payor: Rigoberto Reece / Plan: [address] [place] [address] / Product Type: PPO /    In time:905  Out time:943  Total Treatment Time (min): 38  Visit #: 1 of 16    Medicare/BCBS Only   Total Timed Codes (min):  23 1:1 Treatment Time:  23     Treatment Area: Pain in right ankle and joints of right foot [M25.571]    SUBJECTIVE  Pain Level (0-10 scale): 2  [x]constant []intermittent []improving []worsening [x]no change since onset  Worse walking, stair climbing, activities better rest, ice, decrease activities  Any medication changes, allergies to medications, adverse drug reactions, diagnosis change, or new procedure performed?: [x] No    [] Yes (see summary sheet for update)  Subjective functional status/changes:     PLOF: I all areas of ADLS and activities, no AD , household and community activities, driving,enjoyed walking up to 2 miles  Limitations to PLOF: pain  Mechanism of Injury: 2020 , prolonged walking and hiking  Current symptoms/Complaints: 56 YO female diagnosed as above and with S/S consistent with above diagnosis presents to skilled outpatient PT [facility] right ankle pain and burning.    Previous Treatment/Compliance: MD, ice, voltaren gel, stretches, night splint, tylenol  PMHx/Surgical Hx: fibromyalgia, arthritis, HTN, RA, polymyalgia rheumatica  Work Hx: retired  Living Situation: lives 2 story house, not alone  Pt Goals: decrease pain  Barriers: [x]pain []financial []time []transportation []other  Motivation: good  Substance use: [x]Alcohol []Tobacco []other:   FABQ Score: []low []elevate  Cognition: A & O x 4    Other:    OBJECTIVE/EXAMINATION  Domestic Life: walking program as tolerated, household and community activities  Activity/Recreational Limitations: pain  Mobility: I, no AD   Self Care: I            15 min [x]Eval                  []Re-Eval       15 min Therapeutic Exercise:  [x] See flow sheet :   Rationale: increase ROM, increase strength and improve coordination to improve the patients ability to tolerate ADLS and activities    8 min Self Care:  []  See flow sheet :   Rationale: ice massage, FOTO, POC, Anatomy, GOALS, IONTO v PHONO , KT  to improve the patients ability to tolerate ADLS and activities, decrease pain and inflammation                 With   [x] TE   [] TA   [] neuro   [x] other: Patient Education: [x] Review HEP    [] Progressed/Changed HEP based on:   [] positioning   [] body mechanics   [] transfers   [] heat/ice application    [x] other: FOTO, POC, Goals, Anatomy, Ice massage     Other Objective/Functional Measures:      Physical Therapy Evaluation  - Foot and Ankle    Gait: [] Normal    [] Abnormal    [x] Antalgic    [] NWB    Device:none  Describe:reciprocal steps and stairs    ROM/Strength   Long sitting  [] Unable to assess at this time      AROM        PROM            Strength (1-5)   Left Right Left Right Left  Right   Dorsiflexion -40N - +4N -54N - -30N   5 4+   Plantarflexion 40-50 54-60   5 4-   Inversion 45 22 tight   5 4+   Eversion 20 8 tight   5 4-   Great Toe Ext         Great Toe Flex           Flexibility: [] Unable to assess at this time  Gastroc:    (L) Tightness [x] WNL   [] Min   [] Mod   [] Severe    (R) Tightness [] WNL   [] Min   [x] Mod   [] Severe  Soleus:    (L) Tightness [x] WNL   [] Min   [] Mod   [] Severe    (R) Tightness [] WNL   [] Min   [x] Mod   [] Severe  Other:      (L) Tightness [] WNL   [] Min   [] Mod   [] Severe    (R) Tightness [] WNL   [] Min   [] Mod   [] Severe    Palpation:   Location:distal right achilles tendon TTP   Patient's Pain Response: [] Min   [x] Mod   [] Severe  Location:  Patient's Pain Response: [] Min   [] Mod   [] Severe       Pain Level (0-10 scale) post treatment: 2    ASSESSMENT/Changes in Function: Patient demonstrates the potential to make gains with improved ROM, strength, endurance/activity tolerance, functional FOTO survey score   and all within a reasonable time frame so as to increase their functional independence with ADLs and activities for carryover to  Improved quality of life, tolerance to household and community activities, walking program.. Patient requires skilled Physical Therapy so as to monitor their response to and modify their treatment plan accordingly. Patient appears to be an appropriate candidate for skilled outpatient Physical Therapy. Patient will continue to benefit from skilled PT services to modify and progress therapeutic interventions, address ROM deficits, address strength deficits, analyze and address soft tissue restrictions, analyze and cue movement patterns, analyze and modify body mechanics/ergonomics, address imbalance/dizziness and instruct in home and community integration to attain remaining goals.      [x]  See Plan of Care  []  See progress note/recertification  []  See Discharge Summary         Progress towards goals / Updated goals:       PLAN  [x]  Upgrade activities as tolerated     [x]  Continue plan of care  []  Update interventions per flow sheet       []  Discharge due to:_  []  Other:_      Mita Bueno, PT 5/26/2021  9:08 AM

## 2021-05-26 NOTE — PROGRESS NOTES
[facility]  [address] [facility], [address] [facility], [address] [address] [address]  [phone] fax      Plan of Care/ Statement of Necessity for Physical Therapy Services    Patient name: Araceli Liu Start of Care: 2021   Referral source: Gibran Tillman MD : 1960    Medical Diagnosis: Pain in right ankle and joints of right foot [M25.571]  Payor: BLUE CROSS / Plan: [address] [place] [address] / Product Type: PPO /  Onset Date:2020    Treatment Diagnosis: right ankle / foot pain   Prior Hospitalization: see medical history Provider#: [id number]   Medications: Verified on Patient summary List    Comorbidities: fibromyalgia, arthritis, HTN, RA, polymyalgia rheumatica   Prior Level of Function:  I all areas of ADLS and activities, no AD , household and community activities, driving,enjoyed walking up to 2 miles     The Plan of Care and following information is based on the information from the initial evaluation. Assessment/ key information: 56 YO female diagnosed as above and with S/S consistent with above diagnosis presents to skilled outpatient PT [facility] right ankle pain and burning. she has LOM, decrease strength and flexibility of the right ankle, decrease tolerance to ADLs and activities, TTP of the distal achilles tendon, Patient demonstrates the potential to make gains with improved ROM, strength, endurance/activity tolerance, functional FOTO survey score   and all within a reasonable time frame so as to increase their functional independence with ADLs and activities for carryover to  Improved quality of life, tolerance to household and community activities, walking program.. Patient requires skilled Physical Therapy so as to monitor their response to and modify their treatment plan accordingly.  Patient appears to be an appropriate candidate for skilled outpatient Physical Therapy.     Evaluation Complexity History MEDIUM  Complexity : 1-2 comorbidities / personal factors will impact the outcome/ POC ; Examination MEDIUM Complexity : 3 Standardized tests and measures addressing body structure, function, activity limitation and / or participation in recreation  ;Presentation LOW Complexity : Stable, uncomplicated  ;Clinical Decision Making MEDIUM Complexity : FOTO score of 26-74  Overall Complexity Rating: LOW   Problem List: pain affecting function, decrease ROM, decrease strength, impaired gait/ balance, decrease ADL/ functional abilitiies, decrease activity tolerance, decrease flexibility/ joint mobility, decrease transfer abilities and other FOTO 43   Treatment Plan may include any combination of the following: Therapeutic exercise, Therapeutic activities, Neuromuscular re-education, Physical agent/modality, Gait/balance training, Manual therapy, Patient education, Self Care training, Functional mobility training, Home safety training, Stair training and Other: IONTOPHORESIS WITH DEXAMETHASONE and or PHONOPHORESIS with hydrocortisone  Patient / Family readiness to learn indicated by: asking questions, trying to perform skills and interest  Persons(s) to be included in education: patient (P)  Barriers to Learning/Limitations: None  Patient Goal (s): decrease pain  Patient Self Reported Health Status: fair  Rehabilitation Potential: good    Short Term Goals: To be accomplished in 4 treatments:   1 patient will have established and be I with HEP to aid with  I and self management at discharge. EVAL issued HEP   CURRENT   2 patient will have overall improvement 20% to aid with increase tolerance to her walking program   EVAL increased pain with walking   CURRENT    Long Term Goals:  To be accomplished in 16 treatments:   1 patient will have overall improvement 50% to aid with increase tolerance to her walking program   EVAL increased pain with walking   CURRENT   2 patient will have FOTO 58 to show significant improved function with ADLs and heavy activities at home   EVAL 43, quite a bit of difficulty   CURRENT   3 patient will have Right AROM in long sitting DF -10 to show increase flexibility for improved tolerance to walking   EVAL right DF in long sitting -54N resting angle to -30N    CURRENT   4 patient will report little to no difficulty with light activities at home   EVAL moderate difficulty   CURRENT    Frequency / Duration: Patient to be seen 2 times per week for 8 weeks. Patient/ Caregiver education and instruction: Diagnosis, prognosis, self care, activity modification and exercises   [x]  Plan of care has been reviewed with SANDRA Gilmore, PT 5/26/2021 9:31 AM  ________________________________________________________________________    I certify that the above Therapy Services are being furnished while the patient is under my care. I agree with the treatment plan and certify that this therapy is necessary.     [de-identified] Signature:____________Date:_________TIME:________     Vanda Tillman MD  ** Signature, Date and Time must be completed for valid certification **      Please sign and return to In [address] [place] [address] [address]  [address] [place] [address], [address] [place] [address], [address] [place] [address] [address]  [phone] fax

## 2021-05-28 ENCOUNTER — HOSPITAL ENCOUNTER (OUTPATIENT)
Dept: PHYSICAL THERAPY | Age: 61
Discharge: HOME | End: 2021-05-28
Payer: COMMERCIAL

## 2021-05-28 DIAGNOSIS — F51.04 CHRONIC INSOMNIA: ICD-10-CM

## 2021-05-28 PROCEDURE — 97140 MANUAL THERAPY 1/> REGIONS: CPT

## 2021-05-28 PROCEDURE — 97110 THERAPEUTIC EXERCISES: CPT

## 2021-05-28 NOTE — PROGRESS NOTES
PT DAILY TREATMENT NOTE     Patient Name: Lars Irwin  Date:2021  : 1960  [x]  Patient  Verified  Payor: Brien Lim / Plan: [address] [place] [address] / Product Type: PPO /    In time: 12:01  Out time:12:50  Total Treatment Time (min): 49  Visit #: 2 of 16    Medicare/BCBS Only   Total Timed Codes (min):  39 1:1 Treatment Time:  39       Treatment Area: Pain in right ankle and joints of right foot [M25.571]    SUBJECTIVE  Pain Level (0-10 scale): 4  Any medication changes, allergies to medications, adverse drug reactions, diagnosis change, or new procedure performed?: [x] No    [] Yes (see summary sheet for update)  Subjective functional status/changes:   [] No changes reported  \"I've been doing my exercises. \"    OBJECTIVE    Modality rationale: decrease edema, decrease inflammation, decrease pain, increase tissue extensibility and increase muscle contraction/control to improve the patients ability to perform ADL    Min Type Additional Details    [] Estim:  []Unatt       []IFC  []Premod                        []Other:  []w/ice   []w/heat  Position:  Location:    [] Estim: []Att    []TENS instruct  []NMES                    []Other:  []w/US   []w/ice   []w/heat  Position:  Location:    []  Traction: [] Cervical       []Lumbar                       [] Prone          []Supine                       []Intermittent   []Continuous Lbs:  [] before manual  [] after manual    []  Ultrasound: []Continuous   [] Pulsed                           []1MHz   []3MHz W/cm2:  Location:    []  Iontophoresis with dexamethasone         Location: [] Take home patch   [] In clinic   10 [x]  Ice     []  heat  []  Ice massage  []  Laser   []  Anodyne Position:long sit  Location:right ankle    []  Laser with stim  []  Other:  Position:  Location:    []  Vasopneumatic Device Pressure:       [] lo [] med [] hi   Temperature: [] lo [] med [] hi   [x] Skin assessment post-treatment:  [x]intact []redness- no adverse reaction    []redness - adverse reaction:      min []Eval                  []Re-Eval       29 min Therapeutic Exercise:  [x] See flow sheet :   Rationale: increase ROM and improve coordination to improve the patients ability to perform ADL      min Therapeutic Activity:  []  See flow sheet :   Rationale: increase ROM, increase strength and improve coordination  to improve the patients ability to perform ADL       min Neuromuscular Re-education:  []  See flow sheet :   Rationale: increase ROM, increase strength, improve coordination, improve balance and increase proprioception  to improve the patients ability to perform ADL     10 min Manual Therapy:  Effleurage/JANY Calderon stretch stretch all plane reclined   The manual therapy interventions were performed at a separate and distinct time from the therapeutic activities interventions. Rationale: decrease pain, increase ROM, increase tissue extensibility, decrease edema , decrease trigger points and increase postural awareness to perform ADL      min Gait Training:  ___ feet with ___ device on level surfaces with ___ level of assist   Rationale: With   [x] TE   [] TA   [] neuro   [] other: Patient Education: [x] Review HEP    [] Progressed/Changed HEP based on:   [] positioning   [] body mechanics   [] transfers   [] heat/ice application    [] other:      Other Objective/Functional Measures:      Pain Level (0-10 scale) post treatment: 2    ASSESSMENT/Changes in Function: Pt completed each there ex fairly well. Pt was questionable if she should walk. Therapist told pt she can but start of slow and gradually increase time and to stretch before and after walk.   Pt understood    Patient will continue to benefit from skilled PT services to address functional mobility deficits, address ROM deficits, address strength deficits, analyze and address soft tissue restrictions, analyze and cue movement patterns, analyze and modify body mechanics/ergonomics, assess and modify postural abnormalities and instruct in home and community integration to attain remaining goals. [x]  See Plan of Care  []  See progress note/recertification  []  See Discharge Summary         Progress towards goals / Updated goals:   1 patient will have established and be I with HEP to aid with  I and self management at discharge. EVAL issued HEP               CURRENT 2x day  5/28               2 patient will have overall improvement 20% to aid with increase tolerance to her walking program               EVAL increased pain with walking               CURRENT     Long Term Goals:  To be accomplished in 16 treatments:               1 patient will have overall improvement 50% to aid with increase tolerance to her walking program               EVAL increased pain with walking               CURRENT               2 patient will have FOTO 58 to show significant improved function with ADLs and heavy activities at home               EVAL 43, quite a bit of difficulty               CURRENT               3 patient will have Right AROM in long sitting DF -10 to show increase flexibility for improved tolerance to walking               EVAL right DF in long sitting -54N resting angle to -30N                CURRENT               4 patient will report little to no difficulty with light activities at home               EVAL moderate difficulty               CURRENT    PLAN  [x]  Upgrade activities as tolerated     []  Continue plan of care  []  Update interventions per flow sheet       []  Discharge due to:_  []  Other:_      Maria L Glen, PTA 5/28/2021  12:06 PM    Future Appointments   Date Time Provider Lian Bustamante   6/1/2021  8:15 AM Orlando [place], PT [facility]   6/3/2021 12:00 PM Orlando [place], PT [facility]   6/8/2021 12:00 PM Orlando [place] PT [facility]   6/10/2021 12:00 PM Orlando [place] PT [facility]   6/15/2021 11:15 AM Orlando [place] PT [facility]   6/17/2021 11:15 AM Pricilladenise Zhangsins, PT [facility]   6/21/2021  7:45 AM IO NURSE VISIT [facility] BS AMB   6/22/2021 12:00 PM Pricilla Cousins, PT [facility]   6/24/2021 12:00 PM Pricilladenise Zhangsins, PT [facility]   6/28/2021  8:20 AM Mercedes Cox MD [facility] BS AMB   6/29/2021 12:00 PM Pricilladenise Castellanos, PT [facility]   7/1/2021 12:00 PM Pricilladenise Castellanos, PT [facility]

## 2021-05-30 RX ORDER — ZOLPIDEM TARTRATE 5 MG/1
TABLET ORAL
Qty: 90 TABLET | Refills: 1 | Status: SHIPPED | OUTPATIENT
Start: 2021-05-30 | End: 2021-06-09 | Stop reason: SDUPTHER

## 2021-06-01 ENCOUNTER — HOSPITAL ENCOUNTER (OUTPATIENT)
Dept: PHYSICAL THERAPY | Age: 61
Discharge: HOME | End: 2021-06-01
Payer: COMMERCIAL

## 2021-06-01 PROCEDURE — 97110 THERAPEUTIC EXERCISES: CPT

## 2021-06-01 PROCEDURE — 97033 APP MDLTY 1+IONTPHRSIS EA 15: CPT

## 2021-06-01 PROCEDURE — 97530 THERAPEUTIC ACTIVITIES: CPT

## 2021-06-03 ENCOUNTER — HOSPITAL ENCOUNTER (OUTPATIENT)
Dept: PHYSICAL THERAPY | Age: 61
Discharge: HOME | End: 2021-06-03
Payer: COMMERCIAL

## 2021-06-03 PROCEDURE — 97110 THERAPEUTIC EXERCISES: CPT

## 2021-06-03 PROCEDURE — 97112 NEUROMUSCULAR REEDUCATION: CPT

## 2021-06-03 PROCEDURE — 97035 APP MDLTY 1+ULTRASOUND EA 15: CPT

## 2021-06-03 NOTE — PROGRESS NOTES
PT DAILY TREATMENT NOTE     Patient Name: Isaias Huynh  Date:6/3/2021  : 1960  [x]  Patient  Verified  Payor: BLUE CROSS / Plan: [address] [place] [address] / Product Type: PPO /    In time:1203  Out time:1243  Total Treatment Time (min): 40  Visit #: 4 of 16    Medicare/BCBS Only   Total Timed Codes (min):  40 1:1 Treatment Time:  40       Treatment Area: Pain in right ankle and joints of right foot [M25.571]    SUBJECTIVE  Pain Level (0-10 scale): 2  Any medication changes, allergies to medications, adverse drug reactions, diagnosis change, or new procedure performed?: [x] No    [] Yes (see summary sheet for update)  Subjective functional status/changes:   [] No changes reported  \"I think the patch helped a little bit, I had less pain yesterday and today- a little bit. \"    OBJECTIVE    Modality rationale: decrease edema, decrease inflammation, decrease pain and increase tissue extensibility to improve the patients ability to tolerate ADLS and activities   Min Type Additional Details    [] Estim:  []Unatt       []IFC  []Premod                        []Other:  []w/ice   []w/heat  Position:  Location:    [] Estim: []Att    []TENS instruct  []NMES                    []Other:  []w/US   []w/ice   []w/heat  Position:  Location:    []  Traction: [] Cervical       []Lumbar                       [] Prone          []Supine                       []Intermittent   []Continuous Lbs:  [] before manual  [] after manual   8 [x]  Ultrasound: [x]Continuous   [] Pulsed                           []1MHz   [x]3MHz W/cm2:1.3  Location:right distal achilles tendon in prone with stretch    []  Iontophoresis with dexamethasone         Location: [] Take home patch   [] In clinic    []  Ice     []  heat  []  Ice massage  []  Laser   []  Anodyne Position:  Location:    []  Laser with stim  []  Other:  Position:  Location:    []  Vasopneumatic Device  Pre-treatment girth:  Post-treatment girth:  Measured at (location): Pressure:       [] lo [] med [] hi   Temperature: [] lo [] med [] hi   [] Skin assessment post-treatment:  []intact []redness- no adverse reaction    []redness - adverse reaction:        16 min Therapeutic Exercise:  [x] See flow sheet :   Rationale: increase ROM, increase strength and improve coordination to improve the patients ability to tolerate ADLs and activities    8 min Therapeutic Activity:  [x]  See flow sheet :   Rationale: increase ROM, increase strength and improve coordination  to improve the patients ability to tolerate ADLs and activities      8 min Neuromuscular Re-education:  []  See flow sheet :KT I strip DPI achilles 75% stretch and I strip 100% mechanical correction posterior ankle/distal achilles tendon   Rationale: decrease pain and inflammation and improve flexibility  to improve the patients ability to tolerate ADLS and activities               With   [x] TE   [x] TA   [x] neuro   [] other: Patient Education: [x] Review HEP    [x] Progressed/Changed HEP based on:   [] positioning   [] body mechanics   [] transfers   [] heat/ice application    [] other:      Other Objective/Functional Measures: VC exercises and tech     Pain Level (0-10 scale) post treatment: 2    ASSESSMENT/Changes in Function: KT and US today due to no dexamethasone. Reports some benefit for ionto last session. Patient will continue to benefit from skilled PT services to modify and progress therapeutic interventions, address ROM deficits, address strength deficits, analyze and address soft tissue restrictions, analyze and cue movement patterns, analyze and modify body mechanics/ergonomics, assess and modify postural abnormalities and instruct in home and community integration to attain remaining goals.      [x]  See Plan of Care  []  See progress note/recertification  []  See Discharge Summary         Progress towards goals / Updated goals:   Short Term Goals: To be accomplished in 4 treatments    1 patient will have established and be I with HEP to aid with  I and self management at discharge.  Delvin Arleen issued HEP               CURRENT 2x day  6/3/21               2 patient will have overall improvement 20% to aid with increase tolerance to her walking program               EVAL increased pain with walking               [id number] ongoing and NA for %  6/3/21     Long Term Goals: To be accomplished in 16 treatments:               1 patient will have overall improvement 50% to aid with increase tolerance to her walking program               EVAL increased pain with walking               NFGEYJR ongoing and NA for % 6/3/21               2 patient will have FOTO 58 to show significant improved function with ADLs and heavy activities at home               EVAL 43, quite a bit of difficulty               KCVCRPJ ongoing and NA 6/3/21               3 patient will have Right AROM in long sitting DF -10 to show increase flexibility for improved tolerance to walking               EVAL right DF in long sitting -54N resting angle to -30N                LSJSSXG NA and ongoing 6/3/21               4 patient will report little to no difficulty with light activities at home               EVAL moderate difficulty               [id number] ongoing and NA 6/3/21   PLAN  [x]  Upgrade activities as tolerated     [x]  Continue plan of care  []  Update interventions per flow sheet       []  Discharge due to:_  []  Other:_      Mejia Davis PT 6/3/2021  12:19 PM    Future Appointments   Date Time Provider Lian Bustamante   6/8/2021 12:00 PM Silvia Argueta PT [facility]   6/10/2021 12:00 PM Silvia Argueta PT [facility]   6/15/2021 11:15 AM Silvia Argueta PT [facility]   6/17/2021 11:15 AM [facility]   6/21/2021  7:45 AM IO NURSE VISIT [facility]   6/22/2021 12:00 PM [facility]   6/24/2021 12:00 PM [facility]   6/28/2021  8:20 AM Angel Luis Farrell MD [facility] BS AMB   6/29/2021 12:00 PM Jennette Bumpers, PT [facility]   7/1/2021 12:00 PM Jennette Bumpers, PT [facility]

## 2021-06-08 ENCOUNTER — HOSPITAL ENCOUNTER (OUTPATIENT)
Dept: PHYSICAL THERAPY | Age: 61
Discharge: HOME | End: 2021-06-08
Payer: COMMERCIAL

## 2021-06-08 PROCEDURE — 97112 NEUROMUSCULAR REEDUCATION: CPT

## 2021-06-08 PROCEDURE — 97035 APP MDLTY 1+ULTRASOUND EA 15: CPT

## 2021-06-08 NOTE — PROGRESS NOTES
PT DAILY TREATMENT NOTE     Patient Name: Stephanie Medley  Date:2021  : 1960  [x]  Patient  Verified  Payor: BLUE CROSS / Plan: [address] [place] [address] / Product Type: PPO /    In time:1205  Out time:1248  Total Treatment Time (min): 43  Visit #: 5 of 16    Medicare/BCBS Only   Total Timed Codes (min):  43 1:1 Treatment Time:  30       Treatment Area: Pain in right ankle and joints of right foot [M25.571]    SUBJECTIVE  Pain Level (0-10 scale): 1  Any medication changes, allergies to medications, adverse drug reactions, diagnosis change, or new procedure performed?: [x] No    [] Yes (see summary sheet for update)  Subjective functional status/changes:   [] No changes reported  \"I don't know if it was the US or the tape but whatever it was it really helped and is feeling better. I could really tell a difference. It is not as sore. The tape stayed on about 3 days.  \"    OBJECTIVE    Modality rationale: decrease inflammation, decrease pain and increase tissue extensibility to improve the patients ability to tolerate ADLs and activities   Min Type Additional Details    [] Estim:  []Unatt       []IFC  []Premod                        []Other:  []w/ice   []w/heat  Position:  Location:    [] Estim: []Att    []TENS instruct  []NMES                    []Other:  []w/US   []w/ice   []w/heat  Position:  Location:    []  Traction: [] Cervical       []Lumbar                       [] Prone          []Supine                       []Intermittent   []Continuous Lbs:  [] before manual  [] after manual   8 [x]  Ultrasound: [x]Continuous   [] Pulsed                           []1MHz   [x]3MHz W/cm2:1.3  Location:distal right achilles    []  Iontophoresis with dexamethasone         Location: [] Take home patch   [] In clinic    []  Ice     []  heat  []  Ice massage  []  Laser   []  Anodyne Position:  Location:    []  Laser with stim  []  Other:  Position:  Location:    []  Vasopneumatic Device  Pre-treatment girth:  Post-treatment girth:  Measured at (location):  Pressure:       [] lo [] med [] hi   Temperature: [] lo [] med [] hi   [] Skin assessment post-treatment:  []intact []redness- no adverse reaction    []redness - adverse reaction:          19 min Therapeutic Exercise:  [x] See flow sheet :   Rationale: increase ROM, increase strength and improve coordination to improve the patients ability to tolerate ADLS and activities    8 min Therapeutic Activity:  [x]  See flow sheet :   Rationale: increase ROM, increase strength and improve coordination  to improve the patients ability to tolerate ADLs and activities     8 min Neuromuscular Re-education:  []  See flow sheet :KT I strip DPI achilles 75% stretch and I strip 100% mechanical correction posterior ankle/distal achilles tendon   Rationale: decrease pain , decrease inflammation, increase flexibiltiy  to improve the patients ability to tolerate ADLS and activities              With   [] TE   [] TA   [] neuro   [] other: Patient Education: [x] Review HEP    [] Progressed/Changed HEP based on:   [] positioning   [] body mechanics   [] transfers   [] heat/ice application    [] other:      Other Objective/Functional Measures: VC exercises and tech, assisted with exercises by exercise tech. Pain Level (0-10 scale) post treatment: 1    ASSESSMENT/Changes in Function: Exercises assisted by exercise tech. Slant board stretches remain most difficult for patient with C/O pain with stretching. She has decrease TTP left distal achilles. Patient pleased to report improvement with decrease warmth to the touch, decrease pain and decrease tenderness to touch of medial right ankle/distal achilles.      Patient will continue to benefit from skilled PT services to modify and progress therapeutic interventions, address functional mobility deficits, address ROM deficits, address strength deficits, analyze and address soft tissue restrictions, analyze and cue movement patterns, analyze and modify body mechanics/ergonomics, assess and modify postural abnormalities and instruct in home and community integration to attain remaining goals.      [x]  See Plan of Care  []  See progress note/recertification  []  See Discharge Summary         Progress towards goals / Updated goals:   Short Term Goals: To be accomplished in 4 treatments    1 patient will have established and be I with HEP to aid with  I and self management at discharge.  Joseph Bustillos issued HEP               CURRENT 2x day  6/8/21               2 patient will have overall improvement 20% to aid with increase tolerance to her walking program               EVAL increased pain with walking               CURRENT ongoing and NA for %  6/8/21     Long Term Goals: To be accomplished in 16 treatments:               1 patient will have overall improvement 50% to aid with increase tolerance to her walking program               EVAL increased pain with walking               CURRENT ongoing and NA for % 6/8/21               2 patient will have FOTO 58 to show significant improved function with ADLs and heavy activities at home               EVAL 43, quite a bit of difficulty               CURRENT ongoing and NA 6/8/21               3 patient will have Right AROM in long sitting DF -10 to show increase flexibility for improved tolerance to walking               EVAL right DF in long sitting -54N resting angle to -30N                CURRENT NA and ongoing 6/8/21               4 patient will report little to no difficulty with light activities at home               EVAL moderate difficulty               CURRENT ongoing and NA 6/8/21     PLAN  [x]  Upgrade activities as tolerated     [x]  Continue plan of care  []  Update interventions per flow sheet       []  Discharge due to:_  []  Other:_      Monserrat Leonard, PT 6/8/2021  12:17 PM    Future Appointments   Date Time Provider Lian Bustamante   6/10/2021 12:00 PM Christina Tello, PT [facility]   6/15/2021 11:15 AM Dm Kingfisher, PT [facility]   6/17/2021 11:15 AM Dm Mandy, PT [facility]   6/21/2021  7:45 AM IOC NURSE VISIT [facility]   6/22/2021 12:00 PM Dm Mandy, PT [facility]   6/24/2021 12:00 PM Dm Mandy, PT [facility]   6/28/2021  8:20 AM Abdon Quinn MD [facility]   6/29/2021 12:00 PM Dm Mandy, PT [facility]   7/1/2021 12:00 PM Dm Mandy, PT [facility]

## 2021-06-09 DIAGNOSIS — F51.04 CHRONIC INSOMNIA: ICD-10-CM

## 2021-06-09 NOTE — TELEPHONE ENCOUNTER
----- Message from Mark Hicks sent at 6/9/2021 10:45 AM EDT -----  Regarding: RE: Prescription Question  Contact: [phone]  Their computer says it was canceled for some reason so I cant get any until 6/16. So please send about 10 to [facility] [employer] today to get me through.   Im sorry for the inconvenience- but Im frustrated too that they messed up something

## 2021-06-09 NOTE — TELEPHONE ENCOUNTER
----- Message from Dayne Sen sent at 6/9/2021  9:41 AM EDT -----  Regarding: Prescription Question  Contact: [phone]  I need your help-[facility] somehow messed up my  Zolpidem RX. I need a short term supply of it to get me through. Please send a RX to [facility] at [address] today .   Thanks

## 2021-06-10 ENCOUNTER — TELEPHONE (OUTPATIENT)
Dept: INTERNAL MEDICINE CLINIC | Age: 61
End: 2021-06-10

## 2021-06-10 ENCOUNTER — HOSPITAL ENCOUNTER (OUTPATIENT)
Dept: PHYSICAL THERAPY | Age: 61
Discharge: HOME | End: 2021-06-10
Payer: COMMERCIAL

## 2021-06-10 PROCEDURE — 97110 THERAPEUTIC EXERCISES: CPT

## 2021-06-10 PROCEDURE — 97112 NEUROMUSCULAR REEDUCATION: CPT

## 2021-06-10 PROCEDURE — 97035 APP MDLTY 1+ULTRASOUND EA 15: CPT

## 2021-06-10 RX ORDER — ZOLPIDEM TARTRATE 5 MG/1
TABLET ORAL
Qty: 90 TABLET | Refills: 1 | Status: SHIPPED | OUTPATIENT
Start: 2021-06-10 | End: 2021-06-28 | Stop reason: SDUPTHER

## 2021-06-10 RX ORDER — ZOLPIDEM TARTRATE 5 MG/1
TABLET ORAL
Qty: 7 TABLET | Refills: 0 | Status: SHIPPED | OUTPATIENT
Start: 2021-06-10 | End: 2021-12-15

## 2021-06-10 NOTE — PROGRESS NOTES
PT DAILY TREATMENT NOTE     Patient Name: Michelle Llamas  Date:6/10/2021  : 1960  [x]  Patient  Verified  Payor: [employer] / Plan: [address] [place] [address] / Product Type: PPO /    In time:1200  Out time:1248  Total Treatment Time (min): 48  Visit #: 6 of 16    Medicare/BCBS Only   Total Timed Codes (min):  48 1:1 Treatment Time:  48       Treatment Area: Pain in right ankle and joints of right foot [M25.571]    SUBJECTIVE  Pain Level (0-10 scale): 1  Any medication changes, allergies to medications, adverse drug reactions, diagnosis change, or new procedure performed?: [x] No    [] Yes (see summary sheet for update)  Subjective functional status/changes:   [] No changes reported  \"at rest I am not having any pain which is great. \"   Reports worst pain is with slantboard gastroc and soleus stretches    OBJECTIVE    Modality rationale: decrease inflammation, decrease pain and increase tissue extensibility to improve the patients ability to tolerate ADLS and activities   Min Type Additional Details    [] Estim:  []Unatt       []IFC  []Premod                        []Other:  []w/ice   []w/heat  Position:  Location:    [] Estim: []Att    []TENS instruct  []NMES                    []Other:  []w/US   []w/ice   []w/heat  Position:  Location:    []  Traction: [] Cervical       []Lumbar                       [] Prone          []Supine                       []Intermittent   []Continuous Lbs:  [] before manual  [] after manual   8 [x]  Ultrasound: [x]Continuous   [] Pulsed                           []1MHz   [x]3MHz W/cm2:1.3  Location:distal achilles tendon right LE    []  Iontophoresis with dexamethasone         Location: [] Take home patch   [] In clinic    []  Ice     []  heat  []  Ice massage  []  Laser   []  Anodyne Position:  Location:    []  Laser with stim  []  Other:  Position:  Location:    []  Vasopneumatic Device  Pre-treatment girth:  Post-treatment girth:  Measured at (location):  Pressure: [] lo [] med [] hi   Temperature: [] lo [] med [] hi   [] Skin assessment post-treatment:  []intact []redness- no adverse reaction    []redness - adverse reaction:          22 min Therapeutic Exercise:  [x] See flow sheet :   Rationale: increase ROM, increase strength and improve coordination to improve the patients ability to tolerate ADLs and activities    10 min Therapeutic Activity:  [x]  See flow sheet :   Rationale: increase ROM, increase strength, improve coordination, improve balance and increase proprioception  to improve the patients ability to tolerate ADLS and activities     8 min Neuromuscular Re-education:  [x]  See flow sheet [de-identified]KT I strip DPI achilles 75% stretch and I strip 100% mechanical correction posterior ankle/distal achilles tend   Rationale: increase ROM, increase strength and improve coordination  to improve the patients ability to tolerate ADLS and activities      With   [x] TE   [x] TA   [] neuro   [] other: Patient Education: [x] Review HEP    [x] Progressed/Changed HEP based on:   [] positioning   [] body mechanics   [] transfers   [] heat/ice application    [] other:      Other Objective/Functional Measures: VC exercises and tech. Added mini squats 10X    Added 1 1/2# to SLR Flexion and SL abduction and increased to 2 sets 10X       Pain Level (0-10 scale) post treatment: 1    ASSESSMENT/Changes in Function: decrease pain reported overall since beginning PT. Added 1 1/2# to supine SLR Flex and SL abduction. Residual TTP distal achilles. Patient will continue to benefit from skilled PT services to modify and progress therapeutic interventions, address ROM deficits, address strength deficits, analyze and address soft tissue restrictions, analyze and cue movement patterns, analyze and modify body mechanics/ergonomics, assess and modify postural abnormalities and instruct in home and community integration to attain remaining goals.      [x]  See Plan of Care  []  See progress note/recertification  []  See Discharge Summary         Progress towards goals / Updated goals:    Short Term Goals: To be accomplished in 4 treatments    1 patient will have established and be I with HEP to aid with  I and self management at discharge.  Marycarmen Mae issued HEP               CURRENT 2x day  6/10/21               2 patient will have overall improvement 20% to aid with increase tolerance to her walking program               EVAL increased pain with walking               CURRENT ongoing and NA for %  6/10/21     Long Term Goals: To be accomplished in 16 treatments:               1 patient will have overall improvement 50% to aid with increase tolerance to her walking program               EVAL increased pain with walking               CURRENT ongoing and NA for % 6/10/21               2 patient will have FOTO 58 to show significant improved function with ADLs and heavy activities at home               EVAL 43, quite a bit of difficulty               CURRENT ongoing and NA 6/10/21               3 patient will have Right AROM in long sitting DF -10 to show increase flexibility for improved tolerance to walking               EVAL right DF in long sitting -54N resting angle to -30N                CURRENT NA and ongoing 6/10/21               4 patient will report little to no difficulty with light activities at home               EVAL moderate difficulty               CURRENT ongoing and NA 6/10/21     PLAN  [x]  Upgrade activities as tolerated     [x]  Continue plan of care  []  Update interventions per flow sheet       []  Discharge due to:_  []  Other:_      Swetha Magaña, PT 6/10/2021  12:07 PM    Future Appointments   Date Time Provider Lian Bustamante   6/15/2021 11:15 AM Veda Merlin, PT [facility]   6/17/2021 11:15 AM [facility]lin[facility]   6/21/2021  7:45 AM [facility] NURSE VISIT [facility] BS AMB   6/22/2021 12:00 PM Veda Merlin, PT [facility]   6/24/2021 12:00 PM Veda Merlin, SHANTE [facility]   6/28/2021  8:20 AM Yana Glover MD [facility] BS AMB   6/29/2021 12:00 PM [facility]   7/1/2021 12:00 PM [facility]

## 2021-06-10 NOTE — TELEPHONE ENCOUNTER
[facility] calling says the Ambien rx was never received. Looks like transmission failed on 5/30. Please resend.  It is already pended

## 2021-06-15 ENCOUNTER — HOSPITAL ENCOUNTER (OUTPATIENT)
Dept: PHYSICAL THERAPY | Age: 61
Discharge: HOME | End: 2021-06-15
Payer: COMMERCIAL

## 2021-06-15 PROCEDURE — 97033 APP MDLTY 1+IONTPHRSIS EA 15: CPT

## 2021-06-15 PROCEDURE — 97140 MANUAL THERAPY 1/> REGIONS: CPT

## 2021-06-15 PROCEDURE — 97110 THERAPEUTIC EXERCISES: CPT

## 2021-06-15 NOTE — PROGRESS NOTES
PT DAILY TREATMENT NOTE     Patient Name: Kang Jerome  Date:6/15/2021  : 1960  [x]  Patient  Verified  Payor: [employer] / Plan: [address] [place] [address] / Product Type: PPO /    In time:1118  Out time:1210  Total Treatment Time (min): 52  Visit #: 7 of 16    Medicare/BCBS Only   Total Timed Codes (min):  52 1:1 Treatment Time:  45       Treatment Area: Pain in right ankle and joints of right foot [M25.571]    SUBJECTIVE  Pain Level (0-10 scale): 1  Any medication changes, allergies to medications, adverse drug reactions, diagnosis change, or new procedure performed?: [x] No    [] Yes (see summary sheet for update)  Subjective functional status/changes:   [] No changes reported  \"the other day after I took the tape off I had increased pain along the tendon, on both sides and I am not sure why. It was weird and it felt warm. \"    OBJECTIVE    Modality rationale: decrease inflammation, decrease pain and increase tissue extensibility to improve the patients ability to tolerate ADLs and activities   Min Type Additional Details    [] Estim:  []Unatt       []IFC  []Premod                        []Other:  []w/ice   []w/heat  Position:  Location:    [] Estim: []Att    []TENS instruct  []NMES                    []Other:  []w/US   []w/ice   []w/heat  Position:  Location:    []  Traction: [] Cervical       []Lumbar                       [] Prone          []Supine                       []Intermittent   []Continuous Lbs:  [] before manual  [] after manual    []  Ultrasound: []Continuous   [] Pulsed                           []1MHz   []3MHz W/cm2:  Location:   8 [x]  Iontophoresis with dexamethasone         Location: distal right achilles tendon [x] Take home patch  4 hour, 80 miliamp patch  [] In clinic    []  Ice     []  heat  []  Ice massage  []  Laser   []  Anodyne Position:  Location:    []  Laser with stim  []  Other:  Position:  Location:    []  Vasopneumatic Device  Pre-treatment girth:  Post-treatment girth:  Measured at (location):  Pressure:       [] lo [] med [] hi   Temperature: [] lo [] med [] hi   [] Skin assessment post-treatment:  []intact []redness- no adverse reaction    []redness - adverse reaction:          24 min Therapeutic Exercise:  [] See flow sheet :   Rationale: increase ROM, increase strength and improve coordination to improve the patients ability to tolerate ADLs and activities    10 min Therapeutic Activity:  []  See flow sheet :   Rationale: increase ROM, increase strength and improve coordination  to improve the patients ability to tolerate ADLs and activities        10 min Manual Therapy:  STM DTM TPR right gastroc, CFM right distal achilles tendon. In prone   The manual therapy interventions were performed at a separate and distinct time from the therapeutic activities interventions. Rationale: decrease pain, increase ROM, increase tissue extensibility and decrease trigger points to tolerate ADLS and activities           With   [x] TE   [x] TA   [] neuro   [] other: Patient Education: [x] Review HEP    [] Progressed/Changed HEP based on:   [] positioning   [] body mechanics   [] transfers   [] heat/ice application    [] other:      Other Objective/Functional Measures: VC exercises and tech   Added orange band 4 way ankle     Pain Level (0-10 scale) post treatment: 1    ASSESSMENT/Changes in Function: residual TTP at the distal right achilles tendon. Resumed ionto today. Tolerated 4 way orange band without difficulty today.  CCO residual pain with gastroc stretch WB and NWB    Patient will continue to benefit from skilled PT services to modify and progress therapeutic interventions, address ROM deficits, address strength deficits, analyze and address soft tissue restrictions, analyze and cue movement patterns, analyze and modify body mechanics/ergonomics, assess and modify postural abnormalities and instruct in home and community integration to attain remaining goals.      [x]  See Plan of Care  []  See progress note/recertification  []  See Discharge Summary         Progress towards goals / Updated goals:  Short Term Goals: To be accomplished in 4 treatments    1 patient will have established and be I with HEP to aid with  I and self management at discharge.  Emmy Dozier issued HEP               CURRENT 2x day  6/15/21               2 patient will have overall improvement 20% to aid with increase tolerance to her walking program               EVAL increased pain with walking               CURRENT ongoing and NA for %  6/15/21     Long Term Goals: To be accomplished in 16 treatments:               1 patient will have overall improvement 50% to aid with increase tolerance to her walking program               EVAL increased pain with walking               CURRENT ongoing and NA for % 6/15/21               2 patient will have FOTO 58 to show significant improved function with ADLs and heavy activities at home               EVAL 43, quite a bit of difficulty               CURRENT ongoing and NA 6/15/21               3 patient will have Right AROM in long sitting DF -10 to show increase flexibility for improved tolerance to walking               EVAL right DF in long sitting -54N resting angle to -30N                CURRENT NA and ongoing 6/15/21               4 patient will report little to no difficulty with light activities at home               EVAL moderate difficulty               CURRENT ongoing and NA 6/15/21     PLAN  [x]  Upgrade activities as tolerated     [x]  Continue plan of care  []  Update interventions per flow sheet       []  Discharge due to:_  [x]  Other:_ recheck  AROM      Tashia Hassan, PT 6/15/2021  11:48 AM    Future Appointments   Date Time Provider Lian Bustamante   6/17/2021 11:15 AM Terell Harrison, PT [facility]   6/21/2021  7:45 AM [facility] NURSE VISIT [facility] BS AMB   6/22/2021 12:00 PM Terell Harrison, PT [facility]   6/24/2021 12:00 PM Adán Hoff Jessi Mount [facility]   6/28/2021  8:20 AM Fabi Ott MD [facility] BS AMB   6/29/2021 12:00 PM Karely Mcwilliams, PT [facility]   7/1/2021 12:00 PM Karely Mcwilliams, PT [facility]

## 2021-06-17 ENCOUNTER — HOSPITAL ENCOUNTER (OUTPATIENT)
Dept: PHYSICAL THERAPY | Age: 61
Discharge: HOME | End: 2021-06-17
Payer: COMMERCIAL

## 2021-06-17 PROCEDURE — 97110 THERAPEUTIC EXERCISES: CPT

## 2021-06-17 PROCEDURE — 97112 NEUROMUSCULAR REEDUCATION: CPT

## 2021-06-17 PROCEDURE — 97035 APP MDLTY 1+ULTRASOUND EA 15: CPT

## 2021-06-17 NOTE — PROGRESS NOTES
PT DAILY TREATMENT NOTE     Patient Name: Radha Alcala  Date:2021  : 1960  [x]  Patient  Verified  Payor: [employer] / Plan: [address] [place] [address] / Product Type: PPO /    In time:1118  Out time:1213  Total Treatment Time (min): 55  Visit #: 8 of 16    Medicare/BCBS Only   Total Timed Codes (min):  55 1:1 Treatment Time:  55       Treatment Area: Pain in right ankle and joints of right foot [M25.571]    SUBJECTIVE  Pain Level (0-10 scale): 2  Any medication changes, allergies to medications, adverse drug reactions, diagnosis change, or new procedure performed?: [x] No    [] Yes (see summary sheet for update)  Subjective functional status/changes:   [] No changes reported  \"It was really bad last night after I did a lot of yard work.  \"     OBJECTIVE    Modality rationale: decrease inflammation, decrease pain and increase tissue extensibility to improve the patients ability to tolerate ADLs and activities   Min Type Additional Details    [] Estim:  []Unatt       []IFC  []Premod                        []Other:  []w/ice   []w/heat  Position:  Location:    [] Estim: []Att    []TENS instruct  []NMES                    []Other:  []w/US   []w/ice   []w/heat  Position:  Location:    []  Traction: [] Cervical       []Lumbar                       [] Prone          []Supine                       []Intermittent   []Continuous Lbs:  [] before manual  [] after manual   8 [x]  Ultrasound: []Continuous   [x] Pulsed 50%                            []1MHz   [x]3MHz W/cm2:1.3  Location:RIGHT DISTAL ACHILLES TENDON    []  Iontophoresis with dexamethasone         Location: [] Take home patch   [] In clinic    []  Ice     []  heat  []  Ice massage  []  Laser   []  Anodyne Position:  Location:    []  Laser with stim  []  Other:  Position:  Location:    []  Vasopneumatic Device    []  Right     []  Left  Pre-treatment girth:  Post-treatment girth:  Measured at (location):  Pressure:       [] lo [] med [] hi Temperature: [] lo [] med [] hi   [] Skin assessment post-treatment:  []intact []redness- no adverse reaction    []redness - adverse reaction:          27 min Therapeutic Exercise:  [x] See flow sheet :   Rationale: increase ROM, increase strength and improve coordination to improve the patients ability to tolerate ADLS and activities    10 min Therapeutic Activity:  [x]  See flow sheet :   Rationale: increase ROM, increase strength and improve coordination  to improve the patients ability to tolerate ADLS and activities     10 min Neuromuscular Re-education:  []  See flow sheet :KT I strip DPI achilles 75% stretch and I strip 100% mechanical correction posterior ankle/distal achilles tend   Rationale: increase ROM, increase strength, improve coordination and decrease pain and increase flexibilty   to improve the patients ability to tolerate ADLS and activities              With   [] TE   [] TA   [] neuro   [] other: Patient Education: [x] Review HEP    [] Progressed/Changed HEP based on:   [] positioning   [] body mechanics   [] transfers   [] heat/ice application    [] other:      Other Objective/Functional Measures: VC exercises and tech. Pain Level (0-10 scale) post treatment: 2    ASSESSMENT/Changes in Function: increase pain today and reports due to yard work yesterday. She reports greater benefit from US and KT v. Ionto. Increased DF range of motion in long sitting and less pain with AROM measurement today. ,     Patient will continue to benefit from skilled PT services to modify and progress therapeutic interventions, address ROM deficits, address strength deficits, analyze and address soft tissue restrictions, analyze and cue movement patterns, analyze and modify body mechanics/ergonomics, assess and modify postural abnormalities and instruct in home and community integration to attain remaining goals.      [x]  See Plan of Care  []  See progress note/recertification  []  See Discharge Summary Progress towards goals / Updated goals:   Short Term Goals: To be accomplished in 4 treatments    1 patient will have established and be I with HEP to aid with  I and self management at discharge.  Misbah Martino issued HEP               CURRENT 2x day  6/17/21               2 patient will have overall improvement 20% to aid with increase tolerance to her walking program               EVAL increased pain with walking               CURRENT ongoing and NA for %  6/17/21     Long Term Goals: To be accomplished in 16 treatments:               1 patient will have overall improvement 50% to aid with increase tolerance to her walking program               EVAL increased pain with walking               CURRENT ongoing and NA for % 6/17/21               2 patient will have FOTO 58 to show significant improved function with ADLs and heavy activities at home               EVAL 43, quite a bit of difficulty               CURRENT ongoing and NA 6/17/21               3 patient will have Right AROM in long sitting DF -10 to show increase flexibility for improved tolerance to walking               EVAL right DF in long sitting -54N resting angle to -30N                CURRENT -45N - +3N 6/17.21               4 patient will report little to no difficulty with light activities at home               EVAL moderate difficulty               CURRENT ongoing and NA 6/17/21   PLAN  [x]  Upgrade activities as tolerated     [x]  Continue plan of care  []  Update interventions per flow sheet       []  Discharge due to:_  []  Other:_      Mere Swenson, PT 6/17/2021  11:41 AM    Future Appointments   Date Time Provider Lian Bustamante   6/21/2021  7:45 AM [facility] NURSE VISIT [facility] BS AMB   6/22/2021 12:00 PM Fatuma Cuevas, [facility]   6/24/2021 12:00 PM Fatuma Cuevas, PT [facility]   6/28/2021  8:20 AM Nicole Calhoun MD [facility] BS AMB   6/29/2021 12:00 PM Fatuma Cuevas, SHANTE BROWN [facility]   7/1/2021 12:00 PM Fatuma Cuevas, PT [facility]

## 2021-06-21 ENCOUNTER — HOSPITAL ENCOUNTER (OUTPATIENT)
Dept: LAB | Age: 61
Discharge: HOME | End: 2021-06-21
Payer: COMMERCIAL

## 2021-06-21 ENCOUNTER — APPOINTMENT (OUTPATIENT)
Dept: INTERNAL MEDICINE CLINIC | Age: 61
End: 2021-06-21

## 2021-06-21 DIAGNOSIS — I10 ESSENTIAL HYPERTENSION: ICD-10-CM

## 2021-06-21 LAB
ALBUMIN SERPL-MCNC: 3.6 G/DL (ref 3.4–5)
ALBUMIN/GLOB SERPL: 1.2 {RATIO} (ref 0.8–1.7)
ALP SERPL-CCNC: 42 U/L (ref 45–117)
ALT SERPL-CCNC: 32 U/L (ref 13–56)
ANION GAP SERPL CALC-SCNC: 3 MMOL/L (ref 3–18)
APPEARANCE UR: CLEAR
AST SERPL-CCNC: 27 U/L (ref 10–38)
BILIRUB SERPL-MCNC: 0.4 MG/DL (ref 0.2–1)
BILIRUB UR QL: NEGATIVE
BUN SERPL-MCNC: 16 MG/DL (ref 7–18)
BUN/CREAT SERPL: 21 (ref 12–20)
CALCIUM SERPL-MCNC: 8.6 MG/DL (ref 8.5–10.1)
CHLORIDE SERPL-SCNC: 107 MMOL/L (ref 100–111)
CHOLEST SERPL-MCNC: 185 MG/DL
CO2 SERPL-SCNC: 29 MMOL/L (ref 21–32)
COLOR UR: YELLOW
CREAT SERPL-MCNC: 0.77 MG/DL (ref 0.6–1.3)
ERYTHROCYTE [DISTWIDTH] IN BLOOD BY AUTOMATED COUNT: 14.6 % (ref 11.6–14.5)
GLOBULIN SER CALC-MCNC: 3 G/DL (ref 2–4)
GLUCOSE SERPL-MCNC: 78 MG/DL (ref 74–99)
GLUCOSE UR STRIP.AUTO-MCNC: NEGATIVE MG/DL
HCT VFR BLD AUTO: 38.8 % (ref 35–45)
HDLC SERPL-MCNC: 72 MG/DL (ref 40–60)
HDLC SERPL: 2.6 {RATIO} (ref 0–5)
HGB BLD-MCNC: 11.9 G/DL (ref 12–16)
HGB UR QL STRIP: NEGATIVE
KETONES UR QL STRIP.AUTO: NEGATIVE MG/DL
LDLC SERPL CALC-MCNC: 77.6 MG/DL (ref 0–100)
LEUKOCYTE ESTERASE UR QL STRIP.AUTO: NEGATIVE
LIPID PROFILE,FLP: ABNORMAL
MCH RBC QN AUTO: 30.9 PG (ref 24–34)
MCHC RBC AUTO-ENTMCNC: 30.7 G/DL (ref 31–37)
MCV RBC AUTO: 100.8 FL (ref 74–97)
NITRITE UR QL STRIP.AUTO: NEGATIVE
PH UR STRIP: 6 [PH] (ref 5–8)
PLATELET # BLD AUTO: 268 K/UL (ref 135–420)
PMV BLD AUTO: 10.3 FL (ref 9.2–11.8)
POTASSIUM SERPL-SCNC: 4.1 MMOL/L (ref 3.5–5.5)
PROT SERPL-MCNC: 6.6 G/DL (ref 6.4–8.2)
PROT UR STRIP-MCNC: NEGATIVE MG/DL
RBC # BLD AUTO: 3.85 M/UL (ref 4.2–5.3)
SODIUM SERPL-SCNC: 139 MMOL/L (ref 136–145)
SP GR UR REFRACTOMETRY: 1.02 (ref 1–1.03)
TRIGL SERPL-MCNC: 177 MG/DL (ref ?–150)
UROBILINOGEN UR QL STRIP.AUTO: 0.2 EU/DL (ref 0.2–1)
VLDLC SERPL CALC-MCNC: 35.4 MG/DL
WBC # BLD AUTO: 6.6 K/UL (ref 4.6–13.2)

## 2021-06-21 PROCEDURE — 80053 COMPREHEN METABOLIC PANEL: CPT

## 2021-06-21 PROCEDURE — 80061 LIPID PANEL: CPT

## 2021-06-21 PROCEDURE — 81003 URINALYSIS AUTO W/O SCOPE: CPT

## 2021-06-21 PROCEDURE — 85027 COMPLETE CBC AUTOMATED: CPT

## 2021-06-22 ENCOUNTER — HOSPITAL ENCOUNTER (OUTPATIENT)
Dept: PHYSICAL THERAPY | Age: 61
Discharge: HOME | End: 2021-06-22
Payer: COMMERCIAL

## 2021-06-22 PROCEDURE — 97035 APP MDLTY 1+ULTRASOUND EA 15: CPT

## 2021-06-22 PROCEDURE — 97110 THERAPEUTIC EXERCISES: CPT

## 2021-06-22 PROCEDURE — 97112 NEUROMUSCULAR REEDUCATION: CPT

## 2021-06-22 NOTE — PROGRESS NOTES
PT DAILY TREATMENT NOTE     Patient Name: Michelle Llamas  Date:2021  : 1960  [x]  Patient  Verified  Payor: Citlali Hensley / Plan: [address] [place] [address] / Product Type: PPO /    In time:1200  Out time:1250  Total Treatment Time (min): 50  Visit #: 9 of 16    Medicare/BCBS Only   Total Timed Codes (min):  50 1:1 Treatment Time:  40       Treatment Area: Pain in right ankle and joints of right foot [M25.571]    SUBJECTIVE  Pain Level (0-10 scale): 2-3  Any medication changes, allergies to medications, adverse drug reactions, diagnosis change, or new procedure performed?: [x] No    [] Yes (see summary sheet for update)  Subjective functional status/changes:   [] No changes reported  \"It looks like it has been swollen. \"    OBJECTIVE    Modality rationale: decrease edema, decrease inflammation, decrease pain and increase tissue extensibility to improve the patients ability to tolerate ADLS and activities   Min Type Additional Details    [] Estim:  []Unatt       []IFC  []Premod                        []Other:  []w/ice   []w/heat  Position:  Location:    [] Estim: []Att    []TENS instruct  []NMES                    []Other:  []w/US   []w/ice   []w/heat  Position:  Location:    []  Traction: [] Cervical       []Lumbar                       [] Prone          []Supine                       []Intermittent   []Continuous Lbs:  [] before manual  [] after manual   8 [x]  Ultrasound: []Continuous   [x] Pulsed 50%                           []1MHz   [x]3MHz W/cm2:1.3  Location:right distal achilles tendon    []  Iontophoresis with dexamethasone         Location: [] Take home patch   [] In clinic    []  Ice     []  heat  []  Ice massage  []  Laser   []  Anodyne Position:  Location:    []  Laser with stim  []  Other:  Position:  Location:    []  Vasopneumatic Device    []  Right     []  Left  Pre-treatment girth:  Post-treatment girth:  Measured at (location):  Pressure:       [] lo [] med [] hi Temperature: [] lo [] med [] hi   [] Skin assessment post-treatment:  []intact []redness- no adverse reaction    []redness - adverse reaction:          25 min Therapeutic Exercise:  [x] See flow sheet :   Rationale: increase ROM, increase strength and improve coordination to improve the patients ability to tolerate ADLS and activities    8 min Therapeutic Activity:  [x]  See flow sheet :   Rationale: increase ROM, increase strength and improve coordination  to improve the patients ability to tolerate ADLS and activities     9 min Neuromuscular Re-education:  [x]  See flow sheet [de-identified]KT I strip DPI achilles 75% stretch and I strip 100% mechanical correction posterior ankle/distal achilles tend   Rationale: increase ROM, increase strength, improve coordination and decrease pain and increase flexibility and decrease inflammation  to improve the patients ability to tolerate ADLs and activities                With   [] TE   [] TA   [] neuro   [] other: Patient Education: [x] Review HEP    [] Progressed/Changed HEP based on:   [] positioning   [] body mechanics   [] transfers   [] heat/ice application    [] other:      Other Objective/Functional Measures: VC exercises and technique     Pain Level (0-10 scale) post treatment: <2-3    ASSESSMENT/Changes in Function: residual edema lateral border of the right distal achilles tendon. Reports continued benefit of US and KT. Patient will continue to benefit from skilled PT services to modify and progress therapeutic interventions, address ROM deficits, address strength deficits, analyze and address soft tissue restrictions, analyze and cue movement patterns, analyze and modify body mechanics/ergonomics, assess and modify postural abnormalities and instruct in home and community integration to attain remaining goals.      [x]  See Plan of Care  []  See progress note/recertification  []  See Discharge Summary         Progress towards goals / Updated goals:    Short Term Goals: To be accomplished in 4 treatments    1 patient will have established and be I with HEP to aid with  I and self management at discharge.  Winifred Barroso issued HEP               CURRENT 2x day  6/22/21               2 patient will have overall improvement 20% to aid with increase tolerance to her walking program               EVAL increased pain with walking               CURRENT ongoing and NA for %  6/22/21     Long Term Goals: To be accomplished in 16 treatments:               1 patient will have overall improvement 50% to aid with increase tolerance to her walking program               EVAL increased pain with walking               CURRENT ongoing and NA for % 6/22/21               2 patient will have FOTO 58 to show significant improved function with ADLs and heavy activities at home               EVAL 43, quite a bit of difficulty               CURRENT recheck next session 6/22/21               3 patient will have Right AROM in long sitting DF -10 to show increase flexibility for improved tolerance to walking               EVAL right DF in long sitting -54N resting angle to -30N                CURRENT -45N - +3N 6/17.21               4 patient will report little to no difficulty with light activities at home               EVAL moderate difficulty               CURRENT ongoing and NA 6/22/21     PLAN  [x]  Upgrade activities as tolerated     [x]  Continue plan of care  []  Update interventions per flow sheet       []  Discharge due to:_  [x]  Other:_recheck next session       Corinne Maria, PT 6/22/2021  12:03 PM    Future Appointments   Date Time Provider Lian Bustamante   6/24/2021 12:00 PM Lauren Truong, PT [facility]   6/28/2021  8:20 AM Keily Dean MD [facility] BS AMB   6/29/2021 12:00 PM Lauren Truong PT [facility]   7/1/2021 12:00 PM Lauren Truong PT [facility]

## 2021-06-22 NOTE — PROGRESS NOTES
57 y.o. WHITE/NON- female who presents for evaluation. She seems to be doing well. No cardiovascular complaints. Bp controlled when she checks. She has not been able to exercise due to achilles tendonitis ongoing about 7 mos now apparently    No sx referable to the thyroid, she was followed by Dr Klein Rout continues to do well for her and she wants to continue    Dr Les Chavez changed her to [address] [facility] 2/21 and is trying to wean down the plaquenil    Sees Dr Janine Pittman. She was able to come off the [place] and the citrucel is working well.  She has not noted any bleeding from anywhere and she had hb come back at 13 in May on routine check by rheum    Past Medical History:   Diagnosis Date    Allergic rhinitis     Dr Anmol Butler 4/14    KIMBERLEY-7 was 19/21    Chronic insomnia 06/17/2019    tried benadryl, trazodone; intol belsomra, doxepin; using ambien    Depression     has tried paxil, zoloft, wellbutrin, cymbalta, trazodone    Dyslipidemia     calculated 10 year risk score was 2.5% (4/15)    Endometriosis     Fibromyalgia     Dr. Les Chavez in past    GERD (gastroesophageal reflux disease)     Hypertension     Multiple thyroid nodules 07/2009    neg FNA Dr. Harvinder Godinez now Dr Augustin Luis    Nephrolithiasis 1980s    Osteopenia     DEXA t score -0.8 spine, -1.6 hip (10/07);  -0.4 spine, -1.1 hip w FRAX 5.2/0.5 (10/12); -1.1 spine, -1.0 hip (4/15); -0.3 spine, -1.5 hip (9/20)  Dr Les Chavez neg w/u secondary causes    Overweight (BMI 25.0-29.9)     peak weight 149 lbs, bmi 29.1 rom 9/16; failed qsymia and belviq; 24h U patria elev but LDST neg 9/16    Polymyalgia rheumatica ([facility] [address]     Dr Les Chavez    PUD (peptic ulcer disease) 2018    Dr Thelma Greer on EGD; h pylori neg    Rheumatoid arthritis of multiple sites with negative rheumatoid factor ([facility] [address] 3/10/2019    Dr Karlyne Littler 2018; ccp+    Rosacea     YOSELIN (stress urinary incontinence, female) 2017    Dr Janine Pittman 2022    Trochanteric bursitis     Vertical diplopia 1/14    Vitamin D deficiency      Past Surgical History:   Procedure Laterality Date    COLONOSCOPY N/A 6/1/2016    Dr Padmini Dong 2005 neg; Dr Zhen Rodriguez 2016 neg    EGD  2005    negative Dr. Padmini Dong, biopsy neg for sprue also    HX APPENDECTOMY      HX CHOLECYSTECTOMY  9/15    Dr Wiggins [facility]      Dr Padmini Dong 2005 neg; Dr Zhen Rodriguez 6/1/16 neg   Edward Velazquez 2003    HX HEENT  2/14    MRI negative    HX ORTHOPAEDIC  2017    Dr Jose Perez; avulsion fx LEFT ankle    HX SEPTOPLASTY      HX JENNY AND BSO      NEUROLOGICAL PROCEDURE UNLISTED  6/14    MRA neck neg, MRA brain negative    NEUROLOGICAL PROCEDURE UNLISTED  6/14    MRI brain neg outside scattered minimal wm change     Social History     Socioeconomic History    Marital status:      Spouse name: Not on file    Number of children: 1    Years of education: Not on file    Highest education level: Not on file   Occupational History    Occupation: ophth tech [address]   Tobacco Use    Smoking status: Never Smoker    Smokeless tobacco: Never Used   Substance and Sexual Activity    Alcohol use: Yes     Comment: occasionally    Drug use: No    Sexual activity: Not on file   Other Topics Concern    Not on file   Social History Narrative    Not on file     Social Determinants of Health     Financial Resource Strain:     Difficulty of Paying Living Expenses:    Food Insecurity:     Worried About Running Out of Food in the Last Year:     [address] in the Last Year:    Transportation Needs:     Lack of Transportation (Medical):      Lack of Transportation (Non-Medical):    Physical Activity:     Days of Exercise per Week:     Minutes of Exercise per Session:    Stress:     Feeling of Stress :    Social Connections:     Frequency of Communication with Friends and Family:     Frequency of Social Gatherings with Friends and Family:     Attends Sikhism Services:     Active Member of Clubs or Organizations:     Attends Club or Organization Meetings:     Marital Status:    Intimate Partner Violence:     Fear of Current or Ex-Partner:     Emotionally Abused:     Physically Abused:     Sexually Abused:      Current Outpatient Medications   Medication Sig    Methylcellulose, with Sugar, (Citrucel, sucrose,) powd Take  by mouth two (2) times a day.  adalimumab (Humira,CF, Pen) 40 mg/0.4 mL injection pen as directed    zolpidem (AMBIEN) 5 mg tablet TAKE 1 TABLET NIGHTLY AS   NEEDED FOR SLEEP. MAX      DAILY AMOUNT IS 1 TABLET (5MG)    escitalopram oxalate (LEXAPRO) 20 mg tablet TAKE 1 TABLET DAILY    benazepriL (LOTENSIN) 10 mg tablet TAKE 1 TABLET DAILY (SOLCO MFR)    ergocalciferol (ERGOCALCIFEROL) 1,250 mcg (50,000 unit) capsule TAKE 1 CAPSULE EVERY 14    DAYS    azelastine (ASTELIN) 137 mcg (0.1 %) nasal spray 2 Sprays by Both Nostrils route two (2) times a day. Use in each nostril as directed    hydrOXYchloroQUINE (Plaquenil) 200 mg tablet Take 200 mg by mouth two (2) times a day.  LORazepam (ATIVAN) 0.5 mg tablet Take 1 Tab by mouth every twelve (12) hours as needed for Anxiety. Max Daily Amount: 1 mg.  estradiol (ESTRACE) 0.5 mg tablet Take  by mouth daily.  glucosamine-chondroitin (ARTHX) 500-400 mg cap Take 1 Cap by mouth daily.  pantoprazole (PROTONIX) 40 mg tablet Take 1 Tab by mouth daily.  multivitamin (ONE A DAY) tablet Take 1 Tab by mouth daily. No current facility-administered medications for this visit.      Allergies   Allergen Reactions    Augmentin [Amoxicillin-Pot Clavulanate] Rash    Avelox [Moxifloxacin] Rash    Belsomra [Suvorexant] Other (comments)     anxiety    Cymbalta [Duloxetine] Unknown (comments)     Pt unsure      Elavil Unknown (comments)     Pt unsure      Lexapro [Escitalopram] Other (comments)     Wt gain    Lyrica [Pregabalin] Other (comments)     Wt gain and double vision    Motrin [Ibuprofen] Rash    Nsaids (Non-Steroidal Anti-Inflammatory Drug) Other (comments)     H/o PUD    Paxil [Paroxetine Hcl] Other (comments)     Weight gain    Pcn [Penicillins] Rash    Trazodone Unknown (comments)     Pt unsure       REVIEW OF SYSTEMS: mammo 3/21, gyn Dr Janine Pittman 2019, colo 6/16 Dr Bhumika Humphrey, [address] 10/20  Ophtho - no vision change or eye pain  Oral - no mouth pain, tongue or tooth problems  Ears - no hearing loss, ear pain, fullness, no swallowing problems  Cardiac - no CP, PND, orthopnea, edema, palpitations or syncope  Chest - no breast masses  Resp - no wheezing, chronic coughing, dyspnea  GI - no heartburn, nausea, vomiting, bleeding, hemorrhoids  Urinary - no dysuria, hematuria, flank pain, urgency, frequency    LABS  From 7/10 showed   gluc 96, cr 0.90, gfr>60, alt 61,        chol 210, tg 179, hdl 41, ldl-c 133, wbc 6.0, hb 14.3, plt 268, ua neg  From 6/12 showed   gluc 87, cr 0.80, gfr 84,  alt 23, ldl-p 1405,                            tsh 1.46, vit d 46.6  From 1/13 showed                 ldl-p 1483, chol 200, tg 122, hdl 57, ldl-c 119  From 8/13 showed   gluc 99, cr 0.90, alt 32,          chol 190, tg 68,   hdl 47, ldl-c 129, wbc 6.3, hb 13.7, plt 310, ra neg  From 2/14 showed                 ldl-p 1423, chol 173, tg 100, hdl 47, ldl-c 106  From 4/14 showed                                     vit d 52.7, b12 538, fol>20  From 4/14 showed                      ach receptor ab neg  From 6/14 showed                      lobo neg, esr neg, vgcc ab neg  From 10/14 showed                      ace level nl, musk ab neg, lyme wb neg  From 4/15 showed   gluc 92, cr 0.92, gfr 70,         chol 202, tg 151, hdl 42, ldl-c 130, wbc 6.2, hb 13.4, plt 323  From 9/15 showed             wbc 6.2, hb 13.8, plt 302, lip 201, ck/trop neg  From 4/16 showed   gluc 90, cr 0.93, gfr 68 alt 23,         chol 204, tg 187, hdl 54, ldl-c 113, wbc 6.7, hb 14.4, plt 347, tsh 2.08, vit d 40.9, ft4 0.85, 24 hr U ca 123, 24h U patria 53, spep neg  From 5/16 showed                      pth 29, tTgAb neg  From 9/16 showed                      LDST neg, ua neg pro  From 11/17 showed gluc 92, cr 0.88, gfr 73, alt 25,         chol 200, tg 174, hdl 61, ldl-c 104, wbc 6.5, hb 13.3, plt 305, hep c neg, vit d 81.3  From 12/18 showed gluc 94, cr 0.89, gfr 71, alt 25,               vit d 52.2  From 5/19 showed                         esr 14, RA neg, ccp 170  From 6/20 showed   gluc 74, cr 1.00, gfr 56, alt 32,         chol 204, tg 201, hdl 75, ldl-c 88,   wbc 8.5, hb 13.4, plt 326    Results for orders placed or performed during the hospital encounter of 06/21/21   CBC W/O DIFF   Result Value Ref Range    WBC 6.6 4.6 - 13.2 K/uL    RBC 3.85 (L) 4.20 - 5.30 M/uL    HGB 11.9 (L) 12.0 - 16.0 g/dL    HCT 38.8 35.0 - 45.0 %    .8 (H) 74.0 - 97.0 FL    MCH 30.9 24.0 - 34.0 PG    MCHC 30.7 (L) 31.0 - 37.0 g/dL    RDW 14.6 (H) 11.6 - 14.5 %    PLATELET 948 316 - 999 K/uL    MPV 10.3 9.2 - 11.8 FL   LIPID PANEL   Result Value Ref Range    LIPID PROFILE          Cholesterol, total 185 <200 MG/DL    Triglyceride 177 (H) <150 MG/DL    HDL Cholesterol 72 (H) 40 - 60 MG/DL    LDL, calculated 77.6 0 - 100 MG/DL    VLDL, calculated 35.4 MG/DL    CHOL/HDL Ratio 2.6 0 - 5.0     METABOLIC PANEL, COMPREHENSIVE   Result Value Ref Range    Sodium 139 136 - 145 mmol/L    Potassium 4.1 3.5 - 5.5 mmol/L    Chloride 107 100 - 111 mmol/L    CO2 29 21 - 32 mmol/L    Anion gap 3 3.0 - 18 mmol/L    Glucose 78 74 - 99 mg/dL    BUN 16 7.0 - 18 MG/DL    Creatinine 0.77 0.6 - 1.3 MG/DL    BUN/Creatinine ratio 21 (H) 12 - 20      GFR est AA >60 >60 ml/min/1.73m2    GFR est non-AA >60 >60 ml/min/1.73m2    Calcium 8.6 8.5 - 10.1 MG/DL    Bilirubin, total 0.4 0.2 - 1.0 MG/DL    ALT (SGPT) 32 13 - 56 U/L    AST (SGOT) 27 10 - 38 U/L    Alk.  phosphatase 42 (L) 45 - 117 U/L    Protein, total 6.6 6.4 - 8.2 g/dL    Albumin 3.6 3.4 - 5.0 g/dL    Globulin 3.0 2.0 - 4.0 g/dL    A-G Ratio 1.2 0.8 - 1.7     URINALYSIS W/ RFLX MICROSCOPIC   Result Value Ref Range    Color YELLOW      Appearance CLEAR      Specific gravity 1.022 1.005 - 1.030      pH (UA) 6.0 5.0 - 8.0      Protein Negative NEG mg/dL    Glucose Negative NEG mg/dL    Ketone Negative NEG mg/dL    Bilirubin Negative NEG      Blood Negative NEG      Urobilinogen 0.2 0.2 - 1.0 EU/dL    Nitrites Negative NEG      Leukocyte Esterase Negative NEG       We reviewed the patient's labs from the last several visits to point out trends in the numbers            Patient Active Problem List   Diagnosis Code    Depression and anxiety F32.9    Osteopenia 2007 Dr. Louisa Lisa, FRAX 5.2/0.5 10/12 M85.80    Thyroid nodules neg bx Dr. Anika Ramos 2009 E04.1    Dyslipidemia E78.5    Essential hypertension I10    Gastroesophageal reflux disease without esophagitis K21.9    Overweight (BMI 25.0-29. 9) E66.3    Chronic constipation K59.09    YOSELIN (stress urinary incontinence, female) N39.3    Fibromyalgia M79.7    Rheumatoid arthritis of multiple sites with negative rheumatoid factor (HCC) M06.09    Chronic insomnia F51.04     Assessment and plan:  1. Allergic rhinitis. Continue current  2. GERD. PPI and avoidance measures  3. Hypertension. Continue current  4. Lipids. Lifestyle and dietary measures  5. Thyroid nodules. F/U Dr. Anna Mondragon as scheduled  6. Osteopenia. Continue current and f/u Dr Louisa Lisa  7.  RA. Per Dr Louisa Lisa  8. Depression and anxiety. Continue current  9. Constipation. Continue citrucel  10. Insomnia. Continue current regimen. 11.  Overweight. Lifestyle and dietary measures. Portion control reiterated. RTC 6/22    Above conditions discussed at length and patient vocalized understanding.   All questions answered to patient satisfaction

## 2021-06-24 ENCOUNTER — APPOINTMENT (OUTPATIENT)
Dept: PHYSICAL THERAPY | Age: 61
End: 2021-06-24
Payer: COMMERCIAL

## 2021-06-28 ENCOUNTER — OFFICE VISIT (OUTPATIENT)
Dept: INTERNAL MEDICINE CLINIC | Age: 61
End: 2021-06-28
Payer: COMMERCIAL

## 2021-06-28 VITALS
TEMPERATURE: 97.7 F | BODY MASS INDEX: 30.82 KG/M2 | SYSTOLIC BLOOD PRESSURE: 120 MMHG | WEIGHT: 157 LBS | HEIGHT: 60 IN | HEART RATE: 68 BPM | OXYGEN SATURATION: 98 % | RESPIRATION RATE: 12 BRPM | DIASTOLIC BLOOD PRESSURE: 61 MMHG

## 2021-06-28 DIAGNOSIS — I10 ESSENTIAL HYPERTENSION: ICD-10-CM

## 2021-06-28 DIAGNOSIS — M85.80 OSTEOPENIA, UNSPECIFIED LOCATION: ICD-10-CM

## 2021-06-28 DIAGNOSIS — E78.5 DYSLIPIDEMIA: ICD-10-CM

## 2021-06-28 DIAGNOSIS — E04.1 THYROID NODULE: ICD-10-CM

## 2021-06-28 DIAGNOSIS — Z00.00 PHYSICAL EXAM: Primary | ICD-10-CM

## 2021-06-28 DIAGNOSIS — K21.9 GASTROESOPHAGEAL REFLUX DISEASE WITHOUT ESOPHAGITIS: ICD-10-CM

## 2021-06-28 DIAGNOSIS — M06.09 RHEUMATOID ARTHRITIS OF MULTIPLE SITES WITH NEGATIVE RHEUMATOID FACTOR (HCC): ICD-10-CM

## 2021-06-28 DIAGNOSIS — F51.04 CHRONIC INSOMNIA: ICD-10-CM

## 2021-06-28 DIAGNOSIS — F32.A DEPRESSION, UNSPECIFIED DEPRESSION TYPE: ICD-10-CM

## 2021-06-28 PROCEDURE — 99396 PREV VISIT EST AGE 40-64: CPT | Performed by: INTERNAL MEDICINE

## 2021-06-28 RX ORDER — METHYLCELLULOSE (WITH SUGAR)
POWDER IN PACKET (EA) ORAL 2 TIMES DAILY
COMMUNITY
End: 2022-01-20

## 2021-06-28 RX ORDER — ADALIMUMAB 40MG/0.4ML
KIT SUBCUTANEOUS
COMMUNITY
Start: 2021-02-25 | End: 2022-01-20

## 2021-06-28 NOTE — PROGRESS NOTES
Deepika Dupont presents today for   Chief Complaint   Patient presents with   [facility] Complete Physical    Labs     6-21-21            Coordination of Care:  1. Have you been to the ER, urgent care clinic since your last visit? Hospitalized since your last visit? NO    2. Have you seen or consulted any other health care providers outside of the [address] [place] [address] since your last visit? Include any pap smears or colon screening.  YES

## 2021-06-29 ENCOUNTER — HOSPITAL ENCOUNTER (OUTPATIENT)
Dept: PHYSICAL THERAPY | Age: 61
Discharge: HOME | End: 2021-06-29
Payer: COMMERCIAL

## 2021-06-29 PROCEDURE — 97112 NEUROMUSCULAR REEDUCATION: CPT

## 2021-06-29 PROCEDURE — 97110 THERAPEUTIC EXERCISES: CPT

## 2021-06-29 PROCEDURE — 97035 APP MDLTY 1+ULTRASOUND EA 15: CPT

## 2021-06-29 NOTE — PROGRESS NOTES
PT DAILY TREATMENT NOTE     Patient Name: Lauren Dean  Date:2021  : 1960  [x]  Patient  Verified  Payor: BLUE CROSS / Plan: [address] [place] [address] / Product Type: PPO /    In time:1200  Out time:1247  Total Treatment Time (min): 47  Visit #: 1 of 8    Medicare/BCBS Only   Total Timed Codes (min):  47 1:1 Treatment Time:  47       Treatment Area: Pain in right ankle and joints of right foot [M25.571]    SUBJECTIVE  Pain Level (0-10 scale): 2  Any medication changes, allergies to medications, adverse drug reactions, diagnosis change, or new procedure performed?: [x] No    [] Yes (see summary sheet for update)  Subjective functional status/changes:   [] No changes reported  \"I rode my bike Friday 4 miles and could hardly walk the next day- it was killing. My ROM is definitely better since doing PT. But it is stiff and still painful.  Overall improvement reported at 30%\"    OBJECTIVE    Modality rationale: decrease inflammation, decrease pain and increase tissue extensibility to improve the patients ability to tolerate ADLs and activities   Min Type Additional Details    [] Estim:  []Unatt       []IFC  []Premod                        []Other:  []w/ice   []w/heat  Position:  Location:    [] Estim: []Att    []TENS instruct  []NMES                    []Other:  []w/US   []w/ice   []w/heat  Position:  Location:    []  Traction: [] Cervical       []Lumbar                       [] Prone          []Supine                       []Intermittent   []Continuous Lbs:  [] before manual  [] after manual   8 [x]  Ultrasound: []Continuous   [x] Pulsed                           []1MHz   [x]3MHz   W/cm2:1.3  Location:right distal achilles    []  Iontophoresis with dexamethasone         Location: [] Take home patch   [] In clinic    []  Ice     []  heat  []  Ice massage  []  Laser   []  Anodyne Position:  Location:    []  Laser with stim  []  Other:  Position:  Location:    []  Vasopneumatic Device    [] Right     []  Left  Pre-treatment girth:  Post-treatment girth:  Measured at (location):  Pressure:       [] lo [] med [] hi   Temperature: [] lo [] med [] hi   [] Skin assessment post-treatment:  []intact []redness- no adverse reaction    []redness - adverse reaction:          23 min Therapeutic Exercise:  [x] See flow sheet :   Rationale: increase ROM, increase strength and improve coordination to improve the patients ability to tolerate ADLS and activities    8 min Therapeutic Activity:  [x]  See flow sheet :   Rationale: increase ROM, increase strength and improve coordination  to improve the patients ability to tolerate ADLS and activities     8 min Neuromuscular Re-education:  []  See flow sheet :KT I strip DPI achilles 75% stretch and I strip 100% mechanical correction posterior ankle/distal achilles tend   Rationale: decrease pain and inflammation and improve flexibillity  to improve the patients ability to tolerate ADLs and activities            With   [] TE   [] TA   [] neuro   [] other: Patient Education: [x] Review HEP    [x] Progressed/Changed HEP based on:   [] positioning   [] body mechanics   [] transfers   [] heat/ice application    [] other:      Other Objective/Functional Measures: VC exercises and tech , FOTO 43    DF in lonsitting -50N - +6 N     Pain Level (0-10 scale) post treatment: 2    ASSESSMENT/Changes in Function:  Patient reports 30% improvement and benefits of US. She limits stair negotiation at home due to pain . CCO discomfort/stretch with DF incline board stretch- will try pro stretch next session . No change with FOTO survey score. Improved ROM right DF.       Patient will continue to benefit from skilled PT services to modify and progress therapeutic interventions, address ROM deficits, address strength deficits, analyze and address soft tissue restrictions, analyze and cue movement patterns, analyze and modify body mechanics/ergonomics, assess and modify postural abnormalities and instruct in home and community integration to attain remaining goals.      [x]  See Plan of Care  [x]  See progress note/recertification  []  See Discharge Summary         Progress towards goals / Updated goals:  Short Term Goals: To be accomplished in 4 treatments    1 patient will have established and be I with HEP to aid with  I and self management at discharge.  Brittany Mckeon issued HEP               CURRENT 2x day  6/29/21               2 patient will have overall improvement 20% to aid with increase tolerance to her walking program               EVAL increased pain with walking               [id number]   30%  6/29/21     Long Term Goals: To be accomplished in 16 treatments:               1 patient will have overall improvement 50% to aid with increase tolerance to her walking program               EVAL increased pain with walking               QDWTABP  30%  6/29/21               2 patient will have FOTO 58 to show significant improved function with ADLs and heavy activities at home               EVAL 43, quite a bit of difficulty               OJZMHBF  49 6/29/21               3 patient will have Right AROM in long sitting DF -10 to show increase flexibility for improved tolerance to walking               EVAL right DF in long sitting -54N resting angle to -30N                CURRENT -50 -+ 6N 6/29/21               4 patient will report little to no difficulty with light activities at home               EVAL moderate difficulty               QMRMETY little difficulty 6/29/21    PLAN  []  Upgrade activities as tolerated     []  Continue plan of care  []  Update interventions per flow sheet       []  Discharge due to:_  []  Other:_      Amy Carnes PT 6/29/2021  12:01 PM    Future Appointments   Date Time Provider Lian Mci   7/1/2021 12:00 PM Audrey Cooper PT [facility]   6/21/2022  8:15 AM [facility] NURSE VISIT [facility]OC BS AMB   6/28/2022  8:00 AM Honey Briggs MD IO BS AMB normal...

## 2021-06-29 NOTE — PROGRESS NOTES
[facility] [address]  [address] [facility], [address] [facility], [address] [address],[place] [address]  [phone] fax    Physician Update  [] Progress Note  [] Discharge Summary  Patient name: Michelle Degroot Start of Care: 21   Referral source: Michael Tillman MD : 1960   Medical/Treatment Diagnosis: Pain in right ankle and joints of right foot [M25.571]  Payor: BLUE CROSS / Plan: [address] [place] [address] / Product Type: PPO /  Onset Date:2020     Prior Hospitalization: see medical history Provider#: [id number]   Medications: Verified on Patient Summary List    Comorbidities: fibromyalgia, arthritis, HTN, RA, polymyalgia rheumatica   Prior Level of Function:  I all areas of ADLS and activities, no AD , household and community activities, driving,enjoyed walking up to 2 miles                    Visits from Start of Care: 10    Missed Visits: 0    Status at Evaluation/Last Progress Note: initial evaluation and POC. Progress towards Goals: Patient reports 30% improvement and benefits of US. She limits stair negotiation at home due to pain . CCO discomfort/stretch with DF incline board stretch- will try pro stretch next session . No change with FOTO survey score at 43 . Improved ROM right DF and ow able to attain +6 N in long sitting.  Patient will continue to benefit from skilled PT services to modify and progress therapeutic interventions, address ROM deficits, address strength deficits, analyze and address soft tissue restrictions, analyze and cue movement patterns, analyze and modify body mechanics/ergonomics, assess and modify postural abnormalities and instruct in home and community integration to attain remaining goals.     Goals: to be achieved in 4 weeks:    1 patient will have overall improvement 50% to aid with increase tolerance to her walking program              PN   30%  21               2 patient will have FOTO 58 to show significant improved function with ADLs and heavy activities at home               PN   43 6/29/21               3 patient will tolerate TM 1/4 mile and no increase pain for carryover to community activities   PN not initiated               4 patient will report little to no difficulty with heavy activities at home               PN  quite a bit of difficulty 6/29/21  ASSESSMENT/RECOMMENDATIONS:  [x]Continue therapy per initial plan/protocol at a frequency of  2 x per week for 4 weeks  []Continue therapy with the following recommended changes:_____________________      _____________________________________________________________________  []Discontinue therapy progressing towards or have reached established goals  []Discontinue therapy due to lack of appreciable progress towards goals  []Discontinue therapy due to lack of attendance or compliance  []Await Physician's recommendations/decisions regarding therapy  []Other:________________________________________________________________    Thank you for this referral. Janee Burks, PT 6/29/2021 12:54 PM  NOTE TO PHYSICIAN:  PLEASE COMPLETE THE ORDERS BELOW AND   FAX TO [facility]: [phone]  If you are unable to process this request in 24 hours please contact our office: [phone]    ? I have read the above report and request that my patient continue as recommended. ? I have read the above report and request that my patient continue therapy with the following changes/special instructions:_____________________________________  ? I have read the above report and request that my patient be discharged from therapy.     [de-identified] Signature:____________Date:_________TIME:________     Anusha Tillman MD  ** Signature, Date and Time must be completed for valid certification **

## 2021-07-01 ENCOUNTER — HOSPITAL ENCOUNTER (OUTPATIENT)
Dept: PHYSICAL THERAPY | Age: 61
Discharge: HOME | End: 2021-07-01
Payer: COMMERCIAL

## 2021-07-01 PROCEDURE — 97112 NEUROMUSCULAR REEDUCATION: CPT

## 2021-07-01 PROCEDURE — 97110 THERAPEUTIC EXERCISES: CPT

## 2021-07-01 PROCEDURE — 97035 APP MDLTY 1+ULTRASOUND EA 15: CPT

## 2021-07-01 NOTE — PROGRESS NOTES
PT DAILY TREATMENT NOTE     Patient Name: Roosevelt Otto  Date:2021  : 1960  [x]  Patient  Verified  Payor: Reebonz BATSHEVA / Plan: [address] [place] [address] / Product Type: PPO /    In time:1203  Out time:1248  Total Treatment Time (min): 45  Visit #: 1 of 8    Medicare/BCBS Only   Total Timed Codes (min):  45 1:1 Treatment Time:  38       Treatment Area: Pain in right ankle and joints of right foot [M25.571]    SUBJECTIVE  Pain Level (0-10 scale): 2  Any medication changes, allergies to medications, adverse drug reactions, diagnosis change, or new procedure performed?: [x] No    [] Yes (see summary sheet for update)  Subjective functional status/changes:   [] No changes reported  \"the tape and that US make it feel better.  \"    OBJECTIVE    Modality rationale: decrease inflammation, decrease pain and increase tissue extensibility to improve the patients ability to tolerate ADLS and activities   Min Type Additional Details    [] Estim:  []Unatt       []IFC  []Premod                        []Other:  []w/ice   []w/heat  Position:  Location:    [] Estim: []Att    []TENS instruct  []NMES                    []Other:  []w/US   []w/ice   []w/heat  Position:  Location:    []  Traction: [] Cervical       []Lumbar                       [] Prone          []Supine                       []Intermittent   []Continuous Lbs:  [] before manual  [] after manual   8 [x]  Ultrasound: []Continuous   [x] Pulsed                           []1MHz   [x]3MHz W/cm2:1.3  Location:right distal achilles    []  Iontophoresis with dexamethasone         Location: [] Take home patch   [] In clinic    []  Ice     []  heat  []  Ice massage  []  Laser   []  Anodyne Position:  Location:    []  Laser with stim  []  Other:  Position:  Location:    []  Vasopneumatic Device    []  Right     []  Left  Pre-treatment girth:  Post-treatment girth:  Measured at (location):  Pressure:       [] lo [] med [] hi   Temperature: [] lo [] med [] hi [] Skin assessment post-treatment:  []intact []redness- no adverse reaction    []redness - adverse reaction:          21 min Therapeutic Exercise:  [x] See flow sheet :   Rationale: increase ROM, increase strength and improve coordination to improve the patients ability to tolerate ADLs and activities    8 min Therapeutic Activity:  [x]  See flow sheet :   Rationale: increase ROM, increase strength, improve coordination, improve balance and increase proprioception  to improve the patients ability to tolerate ADLs and activities     8 min Neuromuscular Re-education:  []  See flow sheet :KT I strip DPI achilles 75% stretch and I strip 100% mechanical correction posterior ankle/distal achilles tend   Rationale: decrease pain, improve flexibility  to improve the patients ability to tolerate ADLs and activities              With   [x] TE   [x] TA   [] neuro   [] other: Patient Education: [x] Review HEP    [x] Progressed/Changed HEP based on:   [] positioning   [] body mechanics   [] transfers   [] heat/ice application    [] other:      Other Objective/Functional Measures: VC exercises and tech, transitioned to [place] with less pain than slant board   Exercises initiated by exercise tech      Pain Level (0-10 scale) post treatment: 2    ASSESSMENT/Changes in Function: tolerated well. Less pain with prostretch. Continues with right achilles pain with benefits from KT and US reported    Patient will continue to benefit from skilled PT services to modify and progress therapeutic interventions, address ROM deficits, address strength deficits, analyze and address soft tissue restrictions, analyze and cue movement patterns, analyze and modify body mechanics/ergonomics, assess and modify postural abnormalities and instruct in home and community integration to attain remaining goals.      [x]  See Plan of Care  [x]  See progress note/recertification  []  See Discharge Summary         Progress towards goals / Updated goals:  Goals: to be achieved in 4 weeks:    1 patient will have overall improvement 50% to aid with increase tolerance to her walking program              PN   30%  6/29/21   CURRENT ongoing NA 7/1/21               2 patient will have FOTO 58 to show significant improved function with ADLs and heavy activities at home               PN   43 6/29/21   CURRENT ongoing NA 7/1/21               3 patient will tolerate TM 1/4 mile and no increase pain for carryover to community activities              PN not initiated   CURRENT initiated 5 minutes 7/1/21               4 patient will report little to no difficulty with heavy activities at home               PN  quite a bit of difficulty 6/29/21   CURRENT ongoing and NA 7/1/21    PLAN  [x]  Upgrade activities as tolerated     []  Continue plan of care  []  Update interventions per flow sheet       []  Discharge due to:_  []  Other:_      Ced Rodriguez, PT 7/1/2021  12:27 PM    Future Appointments   Date Time Provider Lian Bsutamante   6/21/2022  8:15 AM [facility] NURSE VISIT [facility] BS AMB   6/28/2022  8:00 AM Bruno Mcdaniels MD [facility] BS AMB

## 2021-07-07 ENCOUNTER — HOSPITAL ENCOUNTER (OUTPATIENT)
Dept: PHYSICAL THERAPY | Age: 61
Discharge: HOME | End: 2021-07-07
Payer: COMMERCIAL

## 2021-07-07 PROCEDURE — 97110 THERAPEUTIC EXERCISES: CPT

## 2021-07-07 PROCEDURE — 97112 NEUROMUSCULAR REEDUCATION: CPT

## 2021-07-07 PROCEDURE — 97035 APP MDLTY 1+ULTRASOUND EA 15: CPT

## 2021-07-07 NOTE — PROGRESS NOTES
PT DAILY TREATMENT NOTE     Patient Name: Nani Roberson  Date:2021  : 1960  [x]  Patient  Verified  Payor: Shasha Grider / Plan: [address] [place] [address] / Product Type: PPO /    In time:820  Out time:905  Total Treatment Time (min): 45  Visit #: 2 of 8    Medicare/BCBS Only   Total Timed Codes (min):  45 1:1 Treatment Time:  45       Treatment Area: Pain in right ankle and joints of right foot [M25.571]    SUBJECTIVE  Pain Level (0-10 scale): 1  Any medication changes, allergies to medications, adverse drug reactions, diagnosis change, or new procedure performed?: [x] No    [] Yes (see summary sheet for update)  Subjective functional status/changes:   [] No changes reported  \"it was really hurting a lot at [place] on .  I had done a lot of standing and I could just feel it, \"    OBJECTIVE    Modality rationale: decrease edema, decrease inflammation, decrease pain and increase tissue extensibility to improve the patients ability to tolerate ADLs and activities   Min Type Additional Details    [] Estim:  []Unatt       []IFC  []Premod                        []Other:  []w/ice   []w/heat  Position:  Location:    [] Estim: []Att    []TENS instruct  []NMES                    []Other:  []w/US   []w/ice   []w/heat  Position:  Location:    []  Traction: [] Cervical       []Lumbar                       [] Prone          []Supine                       []Intermittent   []Continuous Lbs:  [] before manual  [] after manual   8 [x]  Ultrasound: []Continuous   [x] Pulsed                           []1MHz   [x]3MHz W/cm2:1.3  Location:right distal achilles    []  Iontophoresis with dexamethasone         Location: [] Take home patch   [] In clinic    []  Ice     []  heat  []  Ice massage  []  Laser   []  Anodyne Position:  Location:    []  Laser with stim  []  Other:  Position:  Location:    []  Vasopneumatic Device    []  Right     []  Left  Pre-treatment girth:  Post-treatment girth:  Measured at (location):  Pressure:       [] lo [] med [] hi   Temperature: [] lo [] med [] hi   [] Skin assessment post-treatment:  []intact []redness- no adverse reaction    []redness - adverse reaction:          21 min Therapeutic Exercise:  [x] See flow sheet :   Rationale: increase ROM, increase strength and improve coordination to improve the patients ability to tolerate ADLS and activities    8 min Therapeutic Activity:  [x]  See flow sheet :   Rationale: increase ROM, increase strength and improve coordination  to improve the patients ability to tolerate ADLS and activities     8 min Neuromuscular Re-education:  []  See flow sheet :KT I strip DPI achilles 75% stretch and I strip 100% mechanical correction posterior ankle/distal achilles tendon   Rationale: increase ROM, increase strength, improve coordination and decrease pain and increase flexibility  to improve the patients ability to tolerate ADLS and activities           With   [] TE   [] TA   [] neuro   [] other: Patient Education: [x] Review HEP    [x] Progressed/Changed HEP based on:   [] positioning   [] body mechanics   [] transfers   [] heat/ice application    [] other:      Other Objective/Functional Measures: VC exercises and tech. TM 5 minutes 2.0 mph  prostretch increased to 2X30\"  Mini squats increased to 2 sets 10X  Increased SLR and SL abd to 2 1/2# 2x10 each      Pain Level (0-10 scale) post treatment: 1    ASSESSMENT/Changes in Function: calf stretch better felt with prostretch. Patient will continue to benefit from skilled PT services to modify and progress therapeutic interventions, address ROM deficits, address strength deficits, analyze and address soft tissue restrictions, analyze and cue movement patterns, analyze and modify body mechanics/ergonomics, assess and modify postural abnormalities and instruct in home and community integration to attain remaining goals.      [x]  See Plan of Care  [x]  See progress note/recertification  []  See Discharge Summary         Progress towards goals / Updated goals:   Goals: to be achieved in 4 weeks:    1 patient will have overall improvement 50% to aid with increase tolerance to her walking program              PN   30%  6/29/21              CURRENT ongoing /21               2 patient will have FOTO 58 to show significant improved function with ADLs and heavy activities at home               PN   43 6/29/21              CURRENT ongoing NA 7/7/21               3 patient will tolerate TM 1/4 mile and no increase pain for carryover to community activities              PN not initiated              CURRENT  5 minutes 7/7/21               4 patient will report little to no difficulty with heavy activities at home               PN  quite a bit of difficulty 6/29/21              CURRENT ongoing and NA 7/7/21       PLAN  [x]  Upgrade activities as tolerated     [x]  Continue plan of care  []  Update interventions per flow sheet       []  Discharge due to:_  []  Other:_      Abi Tejada, PT 7/7/2021  8:18 AM    Future Appointments   Date Time Provider Lian Bustamante   7/12/2021 10:30 AM Devon Vallejo, PT [facility]   7/15/2021  4:30 PM Raghav Eric, PTA [facility]   7/19/2021  1:30 PM Raghav [place], PTA [facility]   7/22/2021 11:15 AM Devon Vallejo, PT [facility]   6/21/2022  8:15 AM [facility] NURSE VISIT [facility] BS AMB   6/28/2022  8:00 AM Zach Romero MD [facility] BS AMB

## 2021-07-12 ENCOUNTER — HOSPITAL ENCOUNTER (OUTPATIENT)
Dept: PHYSICAL THERAPY | Age: 61
Discharge: HOME | End: 2021-07-12
Payer: COMMERCIAL

## 2021-07-12 PROCEDURE — 97032 APPL MODALITY 1+ESTIM EA 15: CPT

## 2021-07-12 PROCEDURE — 97110 THERAPEUTIC EXERCISES: CPT

## 2021-07-12 PROCEDURE — 97112 NEUROMUSCULAR REEDUCATION: CPT

## 2021-07-12 NOTE — PROGRESS NOTES
PT DAILY TREATMENT NOTE     Patient Name: Jamie Abrams  Date:2021  : 1960  [x]  Patient  Verified  Payor: BLUE CROSS / Plan: [address] [place] [address] / Product Type: PPO /    In time:1030  Out time:1115  Total Treatment Time (min): 45  Visit #: 3 of 8    Medicare/BCBS Only   Total Timed Codes (min):  45 1:1 Treatment Time:  40       Treatment Area: Pain in right ankle and joints of right foot [M25.571]    SUBJECTIVE  Pain Level (0-10 scale): 1  Any medication changes, allergies to medications, adverse drug reactions, diagnosis change, or new procedure performed?: [x] No    [] Yes (see summary sheet for update)  Subjective functional status/changes:   [] No changes reported  \"It hurts right here on the inside. I was told I had a very small bone spur in the lower achilles, I think that is what may have started all of this.  \"    OBJECTIVE    Modality rationale: decrease inflammation, decrease pain and increase tissue extensibility to improve the patients ability to tolerate ADLS and activities   Min Type Additional Details    [] Estim:  []Unatt       []IFC  []Premod                        []Other:  []w/ice   []w/heat  Position:  Location:   8 [x] Estim: [x]Att    []TENS instruct  []NMES                    [x]Other:LTO  []w/US   []w/ice   []w/heat  Position:supine  Location:medial heel    []  Traction: [] Cervical       []Lumbar                       [] Prone          []Supine                       []Intermittent   []Continuous Lbs:  [] before manual  [] after manual    []  Ultrasound: []Continuous   [] Pulsed                           []1MHz   []3MHz W/cm2:  Location:    []  Iontophoresis with dexamethasone         Location: [] Take home patch   [] In clinic    []  Ice     []  heat  []  Ice massage  []  Laser   []  Anodyne Position:  Location:    []  Laser with stim  []  Other:  Position:  Location:    []  Vasopneumatic Device    []  Right     []  Left  Pre-treatment girth:  Post-treatment girth: Measured at (location):  Pressure:       [] lo [] med [] hi   Temperature: [] lo [] med [] hi   [] Skin assessment post-treatment:  []intact []redness- no adverse reaction    []redness - adverse reaction:          21 min Therapeutic Exercise:  [x] See flow sheet :   Rationale: increase ROM, increase strength and improve coordination to improve the patients ability to tolerate ADLS and activities    8 min Therapeutic Activity:  [x]  See flow sheet :   Rationale: increase ROM, increase strength and improve coordination  to improve the patients ability to tolerate ADLs and activities     8 min Neuromuscular Re-education:  [x]  See flow sheet [de-identified]KT I strip DPI achilles 75% stretch and I strip 100% mechanical correction posterior ankle/distal achilles tendon   Rationale: decrease pain and inflammation  to improve the patients ability to tolerate ADLs and activities           With   [] TE   [] TA   [] neuro   [] other: Patient Education: [x] Review HEP    [] Progressed/Changed HEP based on:   [] positioning   [] body mechanics   [] transfers   [] heat/ice application    [] other:      Other Objective/Functional Measures: VC exercises and tech. Exercises initiated by exercise tech. Pain Level (0-10 scale) post treatment: 1    ASSESSMENT/Changes in Function: point tender pain at medial right ankle. Trial LTO today. She continues to note benefit of KT. Patient will continue to benefit from skilled PT services to modify and progress therapeutic interventions, address ROM deficits, address strength deficits, analyze and address soft tissue restrictions, analyze and cue movement patterns, analyze and modify body mechanics/ergonomics, assess and modify postural abnormalities and instruct in home and community integration to attain remaining goals.      [x]  See Plan of Care  [x]  See progress note/recertification  []  See Discharge Summary         Progress towards goals / Updated goals:  Goals: to be achieved in 4 weeks:    1 patient will have overall improvement 50% to aid with increase tolerance to her walking program              PN   30%  6/29/21              CURRENT ongoing NA 7/12/21               2 patient will have FOTO 58 to show significant improved function with ADLs and heavy activities at home               PN   43 6/29/21              CURRENT ongoing NA 7/12/21               3 patient will tolerate TM 1/4 mile and no increase pain for carryover to community activities              PN not initiated              CURRENT  5 minutes 7/12/21               4 patient will report little to no difficulty with heavy activities at home               PN  quite a bit of difficulty 6/29/21              CURRENT ongoing and NA 7/12/21       PLAN  [x]  Upgrade activities as tolerated     [x]  Continue plan of care  []  Update interventions per flow sheet       []  Discharge due to:_  []  Other:_      Garland Osuna, PT 7/12/2021  10:54 AM    Future Appointments   Date Time Provider Lian Bustamante   7/15/2021  4:30 PM Gerson Wilkins PTA [facility]   7/19/2021  1:30 PM Gerson Wilkins PTA [facility]   7/22/2021 11:15 AM Chelle Redman PT [facility]   6/21/2022  8:15 AM [facility] NURSE VISIT [facility] D[facility]OTHY AMB   6/28/2022  8:00 AM Ihsan Serna MD [facility] BS AMB

## 2021-07-15 ENCOUNTER — HOSPITAL ENCOUNTER (OUTPATIENT)
Dept: PHYSICAL THERAPY | Age: 61
Discharge: HOME | End: 2021-07-15
Payer: COMMERCIAL

## 2021-07-15 PROCEDURE — 97032 APPL MODALITY 1+ESTIM EA 15: CPT

## 2021-07-15 PROCEDURE — 97530 THERAPEUTIC ACTIVITIES: CPT

## 2021-07-15 PROCEDURE — 97110 THERAPEUTIC EXERCISES: CPT

## 2021-07-15 NOTE — PROGRESS NOTES
PT DAILY TREATMENT NOTE     Patient Name: Kristi Griffin  Date:7/15/2021  : 1960  [x]  Patient  Verified  Payor: Hemal Velasquez / Plan: [address] [place] [address] / Product Type: PPO /    In time:4:36   Out time:5:27  Total Treatment Time (min): 51  Visit #: 4 of 8    Medicare/BCBS Only   Total Timed Codes (min):  41 1:1 Treatment Time:  41       Treatment Area: Pain in right ankle and joints of right foot [M25.571]    SUBJECTIVE  Pain Level (0-10 scale): 3  Any medication changes, allergies to medications, adverse drug reactions, diagnosis change, or new procedure performed?: [x] No    [] Yes (see summary sheet for update)  Subjective functional status/changes:   [] No changes reported  \"Still having some pain. \"  OBJECTIVE    Modality rationale: decrease edema, decrease inflammation, decrease pain, increase tissue extensibility and increase muscle contraction/control to improve the patients ability to perform ADL    Min Type Additional Details    [] Estim:  []Unatt       []IFC  []Premod                        []Other:  []w/ice   []w/heat  Position:  Location:   10 [x] Estim: [x]Att    []TENS instruct  []NMES                    [x]Other:LTO  []w/US   []w/ice   []w/heat  Position:long sit  Location:right ankle    []  Traction: [] Cervical       []Lumbar                       [] Prone          []Supine                       []Intermittent   []Continuous Lbs:  [] before manual  [] after manual    []  Ultrasound: []Continuous   [] Pulsed                           []1MHz   []3MHz W/cm2:  Location:    []  Iontophoresis with dexamethasone         Location: [] Take home patch   [] In clinic   10 [x]  Ice     []  heat  []  Ice massage  []  Laser   []  Anodyne Position:long sit  Location:right ankle    []  Laser with stim  []  Other:  Position:  Location:    []  Vasopneumatic Device    []  Right     []  Left  Pre-treatment girth:  Post-treatment girth:  Measured at (location):  Pressure:       [] lo [] med [] hi   Temperature: [] lo [] med [] hi   [x] Skin assessment post-treatment:  [x]intact []redness- no adverse reaction    []redness - adverse reaction:      min []Eval                  []Re-Eval       21 min Therapeutic Exercise:  [x] See flow sheet :   Rationale: increase ROM, increase strength and improve coordination to improve the patients ability to perform ADL     10 min Therapeutic Activity:  [x]  See flow sheet :   Rationale: increase ROM, increase strength and improve coordination  to improve the patients ability to perform ADL       min Neuromuscular Re-education:  []  See flow sheet :   Rationale: increase ROM, increase strength, improve coordination, improve balance and increase proprioception  to improve the patients ability to perform ADL      min Manual Therapy: The manual therapy interventions were performed at a separate and distinct time from the therapeutic activities interventions. Rationale: decrease pain, increase ROM, increase tissue extensibility, decrease edema , decrease trigger points and increase postural awareness to perform ADL      min Gait Training:  ___ feet with ___ device on level surfaces with ___ level of assist   Rationale: With   [x] TE   [] TA   [] neuro   [] other: Patient Education: [x] Review HEP    [] Progressed/Changed HEP based on:   [] positioning   [] body mechanics   [] transfers   [] heat/ice application    [] other:      Other Objective/Functional Measures:      Pain Level (0-10 scale) post treatment: 1    ASSESSMENT/Changes in Function:  Pt was questionable about transferring to [facility] for dry needling. Therapist told pt that she would have to be transferred over there. Pt stated that they only have opening in august and she which to continue her PT here until [place] has a opening. Pt completed each there ex fairly well.      Patient will continue to benefit from skilled PT services to address functional mobility deficits, address ROM deficits, address strength deficits, analyze and address soft tissue restrictions, analyze and cue movement patterns, analyze and modify body mechanics/ergonomics, assess and modify postural abnormalities and instruct in home and community integration to attain remaining goals.      [x]  See Plan of Care  []  See progress note/recertification  []  See Discharge Summary         Progress towards goals / Updated goals:  1 patient will have overall improvement 50% to aid with increase tolerance to her walking program              PN   30%  6/29/21              CURRENT ongoing NA 7/12/21               2 patient will have FOTO 58 to show significant improved function with ADLs and heavy activities at home               PN   43 6/29/21              CURRENT ongoing NA 7/12/21               3 patient will tolerate TM 1/4 mile and no increase pain for carryover to community activities              PN not initiated              CURRENT  5 minutes 7/15/21               4 patient will report little to no difficulty with heavy activities at home               PN  quite a bit of difficulty 6/29/21              CURRENT ongoing and NA 7/12/21    PLAN  [x]  Upgrade activities as tolerated     []  Continue plan of care  []  Update interventions per flow sheet       []  Discharge due to:_  []  Other:_      Maria L Carroll, PTA 7/15/2021  4:48 PM    Future Appointments   Date Time Provider Lian Bustamante   7/19/2021  1:30 PM Maria Elena Yoon PTA [facility]   7/22/2021 11:15 AM Patricia Ovens, PT [facility]   7/27/2021  1:30 PM Patricia Ovens, PT [facility]   7/29/2021  3:45 PM Patricia Ovens, PT [facility]   6/21/2022  8:15 AM [facility] NURSE VISIT [facility]   6/28/2022  8:00 AM Alessandra Spaulding MD [facility]

## 2021-07-19 ENCOUNTER — APPOINTMENT (OUTPATIENT)
Dept: PHYSICAL THERAPY | Age: 61
End: 2021-07-19
Payer: COMMERCIAL

## 2021-07-21 ENCOUNTER — APPOINTMENT (OUTPATIENT)
Dept: PHYSICAL THERAPY | Age: 61
End: 2021-07-21
Payer: COMMERCIAL

## 2021-07-22 ENCOUNTER — HOSPITAL ENCOUNTER (OUTPATIENT)
Dept: PHYSICAL THERAPY | Age: 61
Discharge: HOME | End: 2021-07-22
Payer: COMMERCIAL

## 2021-07-22 PROCEDURE — 97530 THERAPEUTIC ACTIVITIES: CPT

## 2021-07-22 NOTE — PROGRESS NOTES
Physical Therapy Discharge Instructions      [facility] [address], [address] [place] [address], [facility] [address]  [phone] fax    Patient: Elliott Cooley  : 1960      Continue Home Exercise Program 1-2 times per day for 2 weeks, then decrease to 3-5 times per week      Continue with    [x] Ice  as needed PRN times per day     [x] Heat           Follow up with MD:     [] Upon completion of therapy     [x] As needed      Recommendations:     []   Return to activity with home program    []   Return to activity with the following modifications:       []Post Rehab Program    []Join Independent aquatic program     []Return to/join local gym    Additional Comments:  New order for dry needling to be set up at [facility], PT 2021 11:28 AM

## 2021-07-23 ENCOUNTER — APPOINTMENT (OUTPATIENT)
Dept: PHYSICAL THERAPY | Age: 61
End: 2021-07-23
Payer: COMMERCIAL

## 2021-07-27 ENCOUNTER — APPOINTMENT (OUTPATIENT)
Dept: PHYSICAL THERAPY | Age: 61
End: 2021-07-27
Payer: COMMERCIAL

## 2021-07-29 ENCOUNTER — APPOINTMENT (OUTPATIENT)
Dept: PHYSICAL THERAPY | Age: 61
End: 2021-07-29
Payer: COMMERCIAL

## 2021-08-02 ENCOUNTER — HOSPITAL ENCOUNTER (OUTPATIENT)
Dept: PHYSICAL THERAPY | Age: 61
Discharge: HOME | End: 2021-08-02
Payer: COMMERCIAL

## 2021-08-02 PROCEDURE — 97530 THERAPEUTIC ACTIVITIES: CPT

## 2021-08-02 PROCEDURE — 97162 PT EVAL MOD COMPLEX 30 MIN: CPT

## 2021-08-02 PROCEDURE — 97110 THERAPEUTIC EXERCISES: CPT

## 2021-08-02 NOTE — PROGRESS NOTES
PT DAILY TREATMENT NOTE     Patient Name: Maury De Guzman  Date:2021  : 1960  [x]  Patient  Verified  Payor: Dana Dobbins / Plan: [address] [place] [address] / Product Type: PPO /    In time: 913  Out time:954  Total Treatment Time (min): 41  Visit #: 1 of 8    Medicare/BCBS Only   Total Timed Codes (min):  25 1:1 Treatment Time:  41       Treatment Area: Achilles tendinitis, right leg [M76.61]    SUBJECTIVE  Pain Level (0-10 scale): 3  Any medication changes, allergies to medications, adverse drug reactions, diagnosis change, or new procedure performed?: [x] No    [] Yes (see summary sheet for update)  Subjective functional status/changes:   [] No changes reported  Patient presents with chronic right achilles pain beginning 2020. Of note, she reports slipping off a step in July where she \"rolled her ankle inwards\" however, reports this did cause lasting pain. She reports just finishing PT ~8 weeks where laser therapy, ultrasound, and Karilyn Stabs was attempted. She reported mild relief initially with Karilyn Stabs, however did not last. She is being referred for DN now. Patient reports increased discomfort throughout the day including walking >10 minutes, stair negotiation, and with driving. Patient reports relief with ice and rest. Her pain is isolated to the right achilles with pain at insertion side and mild discomfort to medial ankle. Additionally, patient reports a \"bone spur\" on the right posterior ankle. She reports being provided a heel cup for her right foot, however reports it does not provide relief. She tends to wear flip flops as tennis shoes cause increase pain to the achilles. OBJECTIVE    16 min [x]Eval                  []Re-Eval       15 min Therapeutic Exercise:  [x] See flow sheet : Patient provided printed HEP to begin addressing impairments. Patient provided demonstration of exercises and rationale for each exercise to improve compliance to HEP.  Education provided on importance of compliance to HEP for improved carry over between therapy session. Rationale: increase ROM and increase strength to improve the patients ability to perform ADLs and self care tasks with greater ease     10 min Therapeutic Activity:  [x]  See flow sheet : Patient educated provided on pathology, findings, and treatment plan of care. Education provided use of cold pack to assist with swelling and proper shoe wear to improve symptoms. Rationale: improve coordination  to improve the patients ability to perform functional tasks with greater ease         With   [] TE   [] TA   [] neuro   [] other: Patient Education: [x] Review HEP    [] Progressed/Changed HEP based on:   [] positioning   [] body mechanics   [] transfers   [] heat/ice application    [] other:      Other Objective/Functional Measures: see paper chart for evaluation form      Pain Level (0-10 scale) post treatment: 3    ASSESSMENT/Changes in Function: Patient is a 57year old female presenting to initial evaluation with complaints of chronic right achilles pain. She reports finishing up 8 weeks of skilled therapy at another clinic with no relief, however reports mild increase in ROM. She is now referred to this clinic for DN to provide pain relief. She describes her pain as \"achy and stiff\" with sharp pains intermittently. Patient reports no limitations prior to onset and was able to walk 2 miles [place] couple times a week\" prior to onset in November/December. Patient demonstrates poor balance on even and uneven surface, decreased ankle strength, decrease in HS, gastroc, and soleus flexibility, increased swelling, and decreased ROM to right ankle. Despite \"rolling her ankle\" she does not demonstrate signs of ankle sprain. Her signs and symptoms are more consistent with achilles tendonitis/tendonosis. She would benefit from skilled PT 2x/week for 4 weeks to address the above limitations and promote return to PLOF.     Patient will continue to benefit from skilled PT services to modify and progress therapeutic interventions, address functional mobility deficits, address ROM deficits, address strength deficits, analyze and address soft tissue restrictions, analyze and cue movement patterns, analyze and modify body mechanics/ergonomics, assess and modify postural abnormalities, address imbalance/dizziness and instruct in home and community integration to attain remaining goals. []  See Plan of Care  []  See progress note/recertification  []  See Discharge Summary         Progress towards goals / Updated goals:  hort Term Goals: To be accomplished in 2 weeks:  1. Patient will report performance of home exercise program 4 of 7 days in the next week demonstrating compliance to therapy program and progress towards independent management of symptoms. Eval: HEP provided   2. Patient will increase right DF AROM to 5 degrees to improve gait mechanics and ease with stair negotiation. Eval: lacking 2 degrees from neutral     Long Term Goals: To be accomplished in 4 weeks:  1. Patient will report no difficulty with standing 30 minutes to improve ease with grocery shopping and overall mobility. Eval: limited to 10 minutes currently   2. Patient will report at least 50% improvement in overall symptoms to improve overall quality of life. Eval: 0%  3. Patient will demonstrate at least 15 seconds SLS balance on right LE on uneven surface without increase in pain to improve ease with stair negotiation and walking on uneven surfaces such as the beach. Eval: 5 seconds on even surface with increased pain   4. Patient will increase right ankle strength grossly to 5-/5 to improve ease with overall mobility. Eval: 4+/5 DF with increased pain, 4/5 PF with minimal pain, 4-/5 inversion and eversion without pain   5. Patient will increase FOTO survey score to 52 to improve ease with daily tasks and promote return to PLOF.     Eval: 26    PLAN  [x]  Upgrade activities as tolerated     []  Continue plan of care  []  Update interventions per flow sheet       []  Discharge due to:_  []  Other:_      Chacorta Orourke, PT, DPT 8/2/2021  9:58 AM    Future Appointments   Date Time Provider Lian Bustamante   6/21/2022  8:15 AM [facility] NURSE VISIT [facility] BS AMB   6/28/2022  8:00 AM Trae Doty MD [facility] BS AMB

## 2021-08-02 NOTE — PROGRESS NOTES
[facility]  [address] [facility] [address]  [place], [address]  [phone] fax    Plan of Care/ Statement of Necessity for Physical Therapy Services    Patient name: Jonathan Knight Start of Care: 2021   Referral source: Logan Walters DPM : 1960    Medical Diagnosis: Achilles tendinitis, right leg [M76.61]  Payor: FABIENNE HERMAN / Plan: [address] [place] [address] / Product Type: PPO /  Onset Date: 2020    Treatment Diagnosis: right ankle pain    Prior Hospitalization: see medical history Provider#: [id number]   Medications: Verified on Patient summary List    Comorbidities: Arthritis, High Blood Pressure, Other disorders, Prior Surgery, Sleep dysfunction   Prior Level of Function: no limitations, walking ~2 miles \"a couple times a week\"      The Plan of Care and following information is based on the information from the initial evaluation. Assessment/ key information: Patient is a 57year old female presenting to initial evaluation with complaints of chronic right achilles pain. She reports finishing up 8 weeks of skilled therapy at another clinic with no relief, however reports mild increase in ROM. She is now referred to this clinic for DN to provide pain relief. She describes her pain as \"achy and stiff\" with sharp pains intermittently. Patient reports no limitations prior to onset and was able to walk 2 miles [place] couple times a week\" prior to onset in . Patient demonstrates poor balance on even and uneven surface, decreased ankle strength, decrease in HS, gastroc, and soleus flexibility, increased swelling, and decreased ROM to right ankle. Despite \"rolling her ankle\" she does not demonstrate signs of ankle sprain. Her signs and symptoms are more consistent with achilles tendonitis/tendonosis.  She would benefit from skilled PT 2x/week for 4 weeks to address the above limitations and promote return to PLOF.    Evaluation Complexity History MEDIUM  Complexity : 1-2 comorbidities / personal factors will impact the outcome/ POC ; Examination MEDIUM Complexity : 3 Standardized tests and measures addressing body structure, function, activity limitation and / or participation in recreation  ;Presentation MEDIUM Complexity : Evolving with changing characteristics  ; Clinical Decision Making MEDIUM Complexity : FOTO score of 26-74  Overall Complexity Rating: MEDIUM  Problem List: pain affecting function, decrease ROM, decrease strength, impaired gait/ balance, decrease ADL/ functional abilitiies, decrease activity tolerance, decrease flexibility/ joint mobility and decrease transfer abilities   Treatment Plan may include any combination of the following: Therapeutic exercise, Therapeutic activities, Neuromuscular re-education, Physical agent/modality, Gait/balance training, Manual therapy, Patient education, Self Care training, Functional mobility training, Home safety training and Stair training  Patient / Family readiness to learn indicated by: asking questions  Persons(s) to be included in education: patient (P)  Barriers to Learning/Limitations: None  Patient Goal (s): pain relief and increase function  Patient Self Reported Health Status: good  Rehabilitation Potential: fair    Short Term Goals: To be accomplished in 2 weeks:  1. Patient will report performance of home exercise program 4 of 7 days in the next week demonstrating compliance to therapy program and progress towards independent management of symptoms. 2. Patient will increase right DF AROM to 5 degrees to improve gait mechanics and ease with stair negotiation. Long Term Goals: To be accomplished in 4 weeks:  1. Patient will report no difficulty with standing 30 minutes to improve ease with grocery shopping and overall mobility. 2. Patient will report at least 50% improvement in overall symptoms to improve overall quality of life.   3. Patient will demonstrate at least 15 seconds SLS balance on right LE on uneven surface without increase in pain to improve ease with stair negotiation and walking on uneven surfaces such as the beach. 4. Patient will increase right ankle strength grossly to 5-/5 to improve ease with overall mobility. 5. Patient will increase FOTO survey score to 52 to improve ease with daily tasks and promote return to PLOF. Frequency / Duration: Patient to be seen 2 times per week for 4 weeks. Patient/ Caregiver education and instruction: Diagnosis, prognosis, activity modification, exercises and other cold pack application for symptom and swelling management   [x]  Plan of care has been reviewed with SANDRA Emery PT, DPT 8/2/2021 10:13 AM    ________________________________________________________________________    I certify that the above Therapy Services are being furnished while the patient is under my care. I agree with the treatment plan and certify that this therapy is necessary.     [de-identified] Signature:____________Date:_________TIME:________     Js Brink DPM  ** Signature, Date and Time must be completed for valid certification **    Please sign and return to [facility] [address], [facility] [address]  [phone] fax

## 2021-08-11 ENCOUNTER — HOSPITAL ENCOUNTER (OUTPATIENT)
Dept: PHYSICAL THERAPY | Age: 61
Discharge: HOME | End: 2021-08-11
Payer: COMMERCIAL

## 2021-08-11 PROCEDURE — 20560 NDL INSJ W/O NJX 1 OR 2 MUSC: CPT

## 2021-08-11 PROCEDURE — 97110 THERAPEUTIC EXERCISES: CPT

## 2021-08-11 PROCEDURE — 97140 MANUAL THERAPY 1/> REGIONS: CPT

## 2021-08-11 NOTE — PROGRESS NOTES
PT DAILY TREATMENT NOTE 10-18    Patient Name: Ginette Jamil  Date:2021  : 1960  [x]  Patient  Verified  Payor: FABIENNE BATSHEVA / Plan: [address] [place] [address] / Product Type: PPO /    In time:345  Out time:432  Total Treatment Time (min): 47  Visit #: 2 of 8    Medicare/BCBS Only   Total Timed Codes (min):  39 1:1 Treatment Time:  39       Treatment Area: Achilles tendinitis, right leg [M76.61]    SUBJECTIVE  Pain Level (0-10 scale): 3  Any medication changes, allergies to medications, adverse drug reactions, diagnosis change, or new procedure performed?: [x] No    [] Yes (see summary sheet for update)  Subjective functional status/changes:   [] No changes reported  I just want to find some relief.     OBJECTIVE    Modality rationale: decrease pain to improve the patients ability to tolerate post needle soreness   Min Type Additional Details    [] Estim:  []Unatt       []IFC  []Premod                        []Other:  []w/ice   []w/heat  Position:  Location:    [] Estim: []Att    []TENS instruct  []NMES                    []Other:  []w/US   []w/ice   []w/heat  Position:  Location:    []  Traction: [] Cervical       []Lumbar                       [] Prone          []Supine                       []Intermittent   []Continuous Lbs:  [] before manual  [] after manual    []  Ultrasound: []Continuous   [] Pulsed                           []1MHz   []3MHz W/cm2:  Location:    []  Iontophoresis with dexamethasone         Location: [] Take home patch   [] In clinic   8 [x]  Ice     []  heat  []  Ice massage  []  Laser   []  Anodyne Position:prone  Location:right gastoc/soleus/achilles    []  Laser with stim  []  Other:  Position:  Location:    []  Vasopneumatic Device Pressure:       [] lo [] med [] hi   Temperature: [] lo [] med [] hi   [] Skin assessment post-treatment:  []intact []redness- no adverse reaction    []redness - adverse reaction:       8 min Therapeutic Exercise:  [] See flow sheet : Rationale: increase ROM to improve the patients ability to ambulate without pain       28 min Manual Therapy:  IMDN to include education, assessment, set-up, palpation, hemostasis, STM/stretch to treated areas and reassessment only; stick to gastroc/soleus   The manual therapy interventions were performed at a separate and distinct time from the therapeutic activities interventions. Rationale: decrease pain, increase ROM, increase tissue extensibility and decrease trigger points to ambulate without pain  3 min Dry Needling:   [x]  CPT 99172:  needle insertion(s) without injection(s); 1 or 2 muscle(s)  []  CPT 82021:  needle insertion(s) without injection(s); 3 or more muscles. Rationale: decrease pain, increase ROM, increase tissue extensibility and decrease trigger points to enable patient to ambulate without difficulty or pain    Dry Needling Procedure Note    Procedure: An intramuscular manual therapy (dry needling) and a neuro-muscular re-education treatment was done to deactivate myofascial trigger points with a 30 gauge filament needle under aseptic technique. Indications:  [x] Myofascial pain and dysfunction [] Muscled spasms  [x] Myalgia/myositis   [] Muscle cramps  [x] Muscle imbalances  [] Other:    Chart reviewed for the following:  Swathi ORETGA, PT, have reviewed the medical history, summary list and precautions/contraindications for Ralph Lindsay.   TIME OUT performed immediately prior to start of procedure:  Swathi ORTEGA, PT, have performed the following reviews on Ralph Lindsay prior to the start of the session:      [x] Verified patient identification by name and date of birth    [x] Agreement on all muscles being treated was verified   [x] Purpose of dry needling, side effects, possible complications, risks and benefits were explained to the patient   [x] Procedure site(s) verified  [x] Patient was positioned for comfort and draped for privacy  [x] Informed Consent was signed (initial visit) and verified verbally (subsequent visits)  [x] Patient was instructed on the need to report the use of blood thinners and/or immunosuppressant medications  [x] How to respond to possible adverse effects of treatment  [x] Self treatment of post needling soreness: ice, heat (moist heat, heat wraps) and stretching  [x] Opportunity was given to ask any questions, all questions were answered            Time: 345  Date of procedure: 8/11/2021    Treatment: The following muscles were treated today with intramuscular dry needling  [] Left [] Right Abdominals: Ant Rectus Abdominis  [] Left [] Right External Oblique / Internal Oblique / Transverse Abdominis  [] Left [] Right Thoracic Multifidi / Edie   [] Left [] Right Iliocostalis Thoracis / Guzman Galarza  [] Left [] Right Longissimus Thoracis / Guzman Galarza  [] Left [] Right Lumbar Multifidi  [] Left [] Right Serratus Posterior Inferior  [] Left [] Right Quadratus Lumborum  [] Left [] Right Psoas  [] Left [] Right Iliacus  [] Left [] Right Iliopsoas (inguinal)  [] Left [] Right Piriformis  [] Left [] Right Quadratus Femoris  [] Left [] Right Lindle Riff / Barnie Lias / George [place]  [] Left [] Right Obturator Internus  [] Left [] Right Obturator Externus / Alexa Lobe / Inferior Gemellus    [] Left [] Right Tensor Fasciae Vivien  [] Left [] Right Iliotibial Band  [] Left [] Right Pectineus  [] Left [] Right Sartorius  [] Left [] Right Gracilis  [] Left [] Right Adductor Brevis / Adductor Longus / Jeronimo Alcantar  [] Left [] Right Rectus Femoris / Vastus Lateralis / Vastus Intermedius / Vastus Medialis Obliquus  [] Left [] Right Tibialis Anterior / Posterior  [] Left [] Right Extensor Digitorum Longus / Brevis  [] Left [] Right Extensor Hallucis Longus / Brevis  [] Left [] Right Hamstrings: Biceps Femoris / Burns Greener / Semimembranosis  [] Left [] Right Popliteus / Planteris  [] Left [x] Right Gastrocnemius Medial / Lateral  [] Left [x] Right Soleus  [] Left [] Right Peronei: Longus / Andreea Carsonch / Stephanie Noe  [] Left [] Right Flexor Digitorum Longus / Brevis  [] Left [] Right Flexor Hallucis Longus / Brevis  [] Left [] Right Quadratus Plantae  [] Left [] Right Abductor Digiti Minimi  [] Left [] Right Foot Interossei  [] Left [] Right Other:    Patient's response to today's treatment:  [x] Latent Twitch Response  [x] Muscle relaxation [] Pain Relief  [x] Post needling soreness [x] without complications  [] Increased Range of Motion  [] Other:     Performed by: Phyllis Alas, PT      With   [] TE   [] TA   [] neuro   [] other: Patient Education: [x] Review HEP    [] Progressed/Changed HEP based on:   [] positioning   [] body mechanics   [] transfers   [] heat/ice application    [] other:      Other Objective/Functional Measures:      Pain Level (0-10 scale) post treatment: 5    ASSESSMENT/Changes in Function: Fair tolerance to DN. Will incorporate [place] 1x/week in addition to DN 1x/week to address soft tissue restrictions and TrPs. Patient will continue to benefit from skilled PT services to modify and progress therapeutic interventions, address functional mobility deficits, address ROM deficits, address strength deficits, analyze and address soft tissue restrictions and analyze and cue movement patterns to attain remaining goals. []  See Plan of Care  []  See progress note/recertification  []  See Discharge Summary         Progress towards goals / Updated goals:  Short Term Goals: To be accomplished in 2 weeks:  1. Patient will report performance of home exercise program 4 of 7 days in the next week demonstrating compliance to therapy program and progress towards independent management of symptoms. Eval: HEP provided    Current: goal met 8/11/21  2. Patient will increase right DF AROM to 5 degrees to improve gait mechanics and ease with stair negotiation. Eval: lacking 2 degrees from neutral      Long Term Goals:  To be accomplished in 4 weeks:  1. Patient will report no difficulty with standing 30 minutes to improve ease with grocery shopping and overall mobility. Eval: limited to 10 minutes currently   2. Patient will report at least 50% improvement in overall symptoms to improve overall quality of life. Eval: 0%  3. Patient will demonstrate at least 15 seconds SLS balance on right LE on uneven surface without increase in pain to improve ease with stair negotiation and walking on uneven surfaces such as the beach. Eval: 5 seconds on even surface with increased pain   4. Patient will increase right ankle strength grossly to 5-/5 to improve ease with overall mobility. Eval: 4+/5 DF with increased pain, 4/5 PF with minimal pain, 4-/5 inversion and eversion without pain   5. Patient will increase FOTO survey score to 52 to improve ease with daily tasks and promote return to PLOF.                Eval: 26    PLAN  [x]  Upgrade activities as tolerated     []  Continue plan of care  []  Update interventions per flow sheet       []  Discharge due to:_  []  Other:_      KELECHI Aguirre, CMTPT 8/11/2021  9:14 AM    Future Appointments   Date Time Provider Lian Bustamante   8/11/2021  3:45 PM Jadyn Hey, PT [facility]   8/13/2021  4:30 PM Jadyn Hey, PT [facility]   8/17/2021 12:45 PM Jadyn Hey, PT [facility]   8/20/2021 10:30 AM Maria L Pablo [facility]   8/24/2021 12:45 PM Jadyn Hey, PT [facility]   8/27/2021 12:00 PM Maria L Pablo [facility]   8/31/2021 12:45 PM Jadyn Hey, PT [facility]   6/21/2022  8:15 AM [facility] NURSE VISIT [facility]   6/28/2022  8:00 AM Dumaran, Wetzel Nissen, MD [facility]

## 2021-08-13 ENCOUNTER — APPOINTMENT (OUTPATIENT)
Dept: PHYSICAL THERAPY | Age: 61
End: 2021-08-13
Payer: COMMERCIAL

## 2021-08-17 ENCOUNTER — HOSPITAL ENCOUNTER (OUTPATIENT)
Dept: PHYSICAL THERAPY | Age: 61
Discharge: HOME | End: 2021-08-17
Payer: COMMERCIAL

## 2021-08-17 PROCEDURE — 97110 THERAPEUTIC EXERCISES: CPT

## 2021-08-17 PROCEDURE — 20560 NDL INSJ W/O NJX 1 OR 2 MUSC: CPT

## 2021-08-17 PROCEDURE — 97140 MANUAL THERAPY 1/> REGIONS: CPT

## 2021-08-17 NOTE — PROGRESS NOTES
PT DAILY TREATMENT NOTE 10-18    Patient Name: Kevin Pryor  Date:2021  : 1960  [x]  Patient  Verified  Payor: BLUE CROSS / Plan: [address] [place] [address] / Product Type: PPO /    In time:1238  Out time:126  Total Treatment Time (min): 48  Visit #: 3 of 8    Medicare/BCBS Only   Total Timed Codes (min):  38 1:1 Treatment Time:  38       Treatment Area: Achilles tendinitis, right leg [M76.61]    SUBJECTIVE  Pain Level (0-10 scale): 3  Any medication changes, allergies to medications, adverse drug reactions, diagnosis change, or new procedure performed?: [x] No    [] Yes (see summary sheet for update)  Subjective functional status/changes:   [] No changes reported  I didn't notice any difference with the needling.      OBJECTIVE    Modality rationale: decrease pain to improve the patients ability to tolerate post needle soreness   Min Type Additional Details    [] Estim:  []Unatt       []IFC  []Premod                        []Other:  []w/ice   []w/heat  Position:  Location:    [] Estim: []Att    []TENS instruct  []NMES                    []Other:  []w/US   []w/ice   []w/heat  Position:  Location:    []  Traction: [] Cervical       []Lumbar                       [] Prone          []Supine                       []Intermittent   []Continuous Lbs:  [] before manual  [] after manual    []  Ultrasound: []Continuous   [] Pulsed                           []1MHz   []3MHz W/cm2:  Location:    []  Iontophoresis with dexamethasone         Location: [] Take home patch   [] In clinic   10 [x]  Ice     []  heat  []  Ice massage  []  Laser   []  Anodyne Position:prone  Location:right calf    []  Laser with stim  []  Other:  Position:  Location:    []  Vasopneumatic Device Pressure:       [] lo [] med [] hi   Temperature: [] lo [] med [] hi   [] Skin assessment post-treatment:  []intact []redness- no adverse reaction    []redness - adverse reaction:       13 min Therapeutic Exercise:  [] See flow sheet : Rationale: increase ROM to improve the patients ability to ambulate without pain     23 min Manual Therapy:  IMDN to include education, assessment, set-up, palpation, hemostasis, STM/stretch to treated areas and reassessment only   The manual therapy interventions were performed at a separate and distinct time from the therapeutic activities interventions. Rationale: decrease pain, increase ROM, increase tissue extensibility and decrease trigger points to ambulate without pain  2 min Dry Needling:   [x]  CPT 13477:  needle insertion(s) without injection(s); 1 or 2 muscle(s)  []  CPT 45937:  needle insertion(s) without injection(s); 3 or more muscles. Rationale: decrease pain, increase ROM, increase tissue extensibility and decrease trigger points to ambulate without pain    Dry Needling Procedure Note    Procedure: An intramuscular manual therapy (dry needling) and a neuro-muscular re-education treatment was done to deactivate myofascial trigger points with a 30 gauge filament needle under aseptic technique. Indications:  [x] Myofascial pain and dysfunction [] Muscled spasms  [x] Myalgia/myositis   [] Muscle cramps  [x] Muscle imbalances  [] Other:    Chart reviewed for the following:  Estefani ORTEGA PT, have reviewed the medical history, summary list and precautions/contraindications for Ralph Lindsay.   TIME OUT performed immediately prior to start of procedure:  Estefani ORTEGA PT, have performed the following reviews on Ralph Lindsay prior to the start of the session:      [x] Verified patient identification by name and date of birth    [x] Agreement on all muscles being treated was verified   [x] Purpose of dry needling, side effects, possible complications, risks and benefits were explained to the patient   [x] Procedure site(s) verified  [x] Patient was positioned for comfort and draped for privacy  [x] Informed Consent was signed (initial visit) and verified verbally (subsequent visits)  [x] Patient was instructed on the need to report the use of blood thinners and/or immunosuppressant medications  [x] How to respond to possible adverse effects of treatment  [x] Self treatment of post needling soreness: ice, heat (moist heat, heat wraps) and stretching  [x] Opportunity was given to ask any questions, all questions were answered            Time: 1238  Date of procedure: 8/17/2021    Treatment: The following muscles were treated today with intramuscular dry needling  [] Left [] Right Abdominals: Ant Rectus Abdominis  [] Left [] Right External Oblique / Internal Oblique / Transverse Abdominis  [] Left [] Right Thoracic Multifidi / Duncan Rouleau  [] Left [] Right Iliocostalis [place] / Trey Terrell  [] Left [] Right Longissimus Thoracis / Yang Terrell  [] Left [] Right Lumbar Multifidi  [] Left [] Right Serratus Posterior Inferior  [] Left [] Right Quadratus Lumborum  [] Left [] Right Psoas  [] Left [] Right Iliacus  [] Left [] Right Iliopsoas (inguinal)  [] Left [] Right Piriformis  [] Left [] Right Quadratus Femoris  [] Left [] Right Trista Flood / Arnie Bodily / Arlyss Reagin  [] Left [] Right Obturator Internus  [] Left [] Right Obturator Externus / Para Contes / Inferior Gemellus    [] Left [] Right Tensor Fasciae Vivien  [] Left [] Right Iliotibial Band  [] Left [] Right Pectineus  [] Left [] Right Sartorius  [] Left [] Right Gracilis  [] Left [] Right Adductor Brevis / Adductor Longus / Arnold Brownie  [] Left [] Right Rectus Femoris / Vastus Lateralis / Vastus Intermedius / Vastus Medialis Obliquus  [] Left [] Right Tibialis Anterior / Posterior  [] Left [] Right Extensor Digitorum Longus / Brevis  [] Left [] Right Extensor Hallucis Longus / Brevis  [] Left [] Right Hamstrings: Biceps Femoris / Ximena Blanchard / Semimembranosis  [] Left [] Right Popliteus / Planteris  [] Left [x] Right Gastrocnemius Medial / Lateral  [] Left [x] Right Soleus  [] Left [] Right Peronei: Longus / Pipo Sina / Tertius  [] Left [] Right Flexor Digitorum Longus / Brevis  [] Left [] Right Flexor Hallucis Longus / Brevis  [] Left [] Right Quadratus Plantae  [] Left [] Right Abductor Digiti Minimi  [] Left [] Right Foot Interossei  [] Left [] Right Other:    Patient's response to today's treatment:  [x] Latent Twitch Response  [x] Muscle relaxation [] Pain Relief  [x] Post needling soreness [x] without complications  [] Increased Range of Motion  [] Other:     Performed by: Rhonda Angulo, PT      With   [] TE   [] TA   [] neuro   [] other: Patient Education: [x] Review HEP    [] Progressed/Changed HEP based on:   [] positioning   [] body mechanics   [] transfers   [] heat/ice application    [] other:      Other Objective/Functional Measures:      Pain Level (0-10 scale) post treatment: 4    ASSESSMENT/Changes in Function: Patient tolerated needling much better today. Will initiate April next visit. Will do a 2 week trial of DN/April; will D/C if no change. Patient will continue to benefit from skilled PT services to modify and progress therapeutic interventions, address strength deficits, analyze and address soft tissue restrictions and analyze and cue movement patterns to attain remaining goals. []  See Plan of Care  []  See progress note/recertification  []  See Discharge Summary         Progress towards goals / Updated goals:  Short Term Goals: To be accomplished in 2 weeks:  1. Patient will report performance of home exercise program 4 of 7 days in the next week demonstrating compliance to therapy program and progress towards independent management of symptoms.             Eval: HEP provided               Current: goal met 8/11/21  2.  Patient will increase right DF AROM to 5 degrees to improve gait mechanics and ease with stair negotiation.               Eval: lacking 2 degrees from neutral     Current: able to achieve > neutral with both Prostretch and Reformer foot work 8/17/21   Long Term Goals: To be accomplished in 4 weeks: 1. Patient will report no difficulty with standing 30 minutes to improve ease with grocery shopping and overall mobility.             Eval: limited to 10 minutes currently   2. Patient will report at least 50% improvement in overall symptoms to improve overall quality of life.              Eval: 0%  3. Patient will demonstrate at least 15 seconds SLS balance on right LE on uneven surface without increase in pain to improve ease with stair negotiation and walking on uneven surfaces such as the beach.               Eval: 5 seconds on even surface with increased pain   4. Patient will increase right ankle strength grossly to 5-/5 to improve ease with overall mobility.               Eval: 4+/5 DF with increased pain, 4/5 PF with minimal pain, 4-/5 inversion and eversion without pain   5. Patient will increase FOTO survey score to 52 to improve ease with daily tasks and promote return to PLOF.                Eval: 26       PLAN  [x]  Upgrade activities as tolerated     []  Continue plan of care  []  Update interventions per flow sheet       []  Discharge due to:_  []  Other:_      [facility], MPT, CMTPT 8/17/2021  8:11 AM    Future Appointments   Date Time Provider Lian Bustamante   8/17/2021 12:45 PM David Chavis, PT [facility]   8/20/2021 10:45 AM Yamila Awad, PT [facility]   8/24/2021 12:45 PM David Chavis, PT [facility]   8/27/2021 11:30 AM Jose Guadalupe Loza, PT [facility]   8/31/2021 12:45 PM David Chavis, PT [facility]   9/3/2021 10:45 AM Yamila Awad, PT [facility]   6/21/2022  8:15 AM [facility] NURSE VISIT [facility]   6/28/2022  8:00 AM Darion Falcon MD [facility]

## 2021-08-18 RX ORDER — ERGOCALCIFEROL 1.25 MG/1
CAPSULE ORAL
Qty: 7 CAPSULE | Refills: 3 | Status: SHIPPED | OUTPATIENT
Start: 2021-08-18 | End: 2022-07-08

## 2021-08-20 ENCOUNTER — HOSPITAL ENCOUNTER (OUTPATIENT)
Dept: PHYSICAL THERAPY | Age: 61
Discharge: HOME | End: 2021-08-20
Payer: COMMERCIAL

## 2021-08-20 PROCEDURE — 97112 NEUROMUSCULAR REEDUCATION: CPT

## 2021-08-20 PROCEDURE — 97110 THERAPEUTIC EXERCISES: CPT

## 2021-08-20 PROCEDURE — 97140 MANUAL THERAPY 1/> REGIONS: CPT

## 2021-08-20 NOTE — PROGRESS NOTES
PT DAILY TREATMENT NOTE     Patient Name: Donald Franco  Date:2021  : 1960  [x]  Patient  Verified  Payor: Jacqueline Rainey / Plan: [address] [place] [address] / Product Type: PPO /    In time:10:45  Out time:11:27  Total Treatment Time (min): 42  Visit #: 4 of 8    Medicare/BCBS Only   Total Timed Codes (min):  42 1:1 Treatment Time:  42       Treatment Area: Achilles tendinitis, right leg [M76.61]    SUBJECTIVE  Pain Level (0-10 scale): 2/10  Any medication changes, allergies to medications, adverse drug reactions, diagnosis change, or new procedure performed?: [x] No    [] Yes (see summary sheet for update)  Subjective functional status/changes:   [] No changes reported  The patient states that she does continue to have calf pain through her tendon that is exacerbated with walking. OBJECTIVE  24 min Therapeutic Exercise:  [] See flow sheet :   Rationale: increase ROM and increase strength to improve the patients ability to improve ADL ease. 8 min Neuromuscular Re-education:  []  See flow sheet :   Rationale: increase ROM and increase strength  to improve the patients ability to improve ADL ease. 10 min Manual Therapy:  Graston Technique Treatment Intervention:    Patient positioning: prone    Treatment location: right gastroc, soleus, and achilles musculotendinous junction, water shed region and insertion    Graston Technique Instruments utilized: GT5, GT4    Explained effects and benefits of Graston Technique, including potential for post treatment soreness and bruising. Patient was provided the opportunity to ask any questions and verbalized understanding. Patient wished to proceed with treatment. The manual therapy interventions were performed at a separate and distinct time from the therapeutic activities interventions. Rationale: decrease pain, increase ROM, increase tissue extensibility and decrease trigger points to improve ADL ease.            With   [] TE   [] TA   [] neuro   [] other: Patient Education: [x] Review HEP    [] Progressed/Changed HEP based on:   [] positioning   [] body mechanics   [] transfers   [] heat/ice application    [] other:      Other Objective/Functional Measures:   Initiated eccentric strengthening as tolerated without increasing patient pain. The patient required cues in order to be mindful of pain increase. Resistances were adjusted accordingly to ensure no significant increase in symptoms upon performance of eccentric strengthening. Pain Level (0-10 scale) post treatment: 2/10    ASSESSMENT/Changes in Function: Graston was tolerated well, the patient remains point tender and most symptomatic through the water shed region of her achilles as well as medially and laterally oriented through the insertion of her right achilles tendon. It was recommended she attempt to progress seated HR to standing, with an emphasis of eccentric control. Ensuring symptom control void of exacerbation was re-iterrated to the patient with the patient voicing understanding. She left in no greater pain than arrival.      Patient will continue to benefit from skilled PT services to modify and progress therapeutic interventions, address functional mobility deficits, address ROM deficits, address strength deficits, analyze and address soft tissue restrictions, analyze and cue movement patterns, analyze and modify body mechanics/ergonomics, assess and modify postural abnormalities and instruct in home and community integration to attain remaining goals. []  See Plan of Care  []  See progress note/recertification  []  See Discharge Summary         Progress towards goals / Updated goals:  Short Term Goals: To be accomplished in 2 weeks:  1. Patient will report performance of home exercise program 4 of 7 days in the next week demonstrating compliance to therapy program and progress towards independent management of symptoms.               Eval: HEP provided               [id number]: goal met 8/11/21  2. Patient will increase right DF AROM to 5 degrees to improve gait mechanics and ease with stair negotiation.               Eval: lacking 2 degrees from neutral               Current: able to achieve > neutral with both Prostretch and Reformer foot work 8/17/21              Long Term Goals: To be accomplished in 4 weeks:  1. Patient will report no difficulty with standing 30 minutes to improve ease with grocery shopping and overall mobility.             Eval: limited to 10 minutes currently   2. Patient will report at least 50% improvement in overall symptoms to improve overall quality of life.              Eval: 0%  3. Patient will demonstrate at least 15 seconds SLS balance on right LE on uneven surface without increase in pain to improve ease with stair negotiation and walking on uneven surfaces such as the beach.               Eval: 5 seconds on even surface with increased pain   4. Patient will increase right ankle strength grossly to 5-/5 to improve ease with overall mobility.               Eval: 4+/5 DF with increased pain, 4/5 PF with minimal pain, 4-/5 inversion and eversion without pain   5. Patient will increase FOTO survey score to 52 to improve ease with daily tasks and promote return to PLOF.                Eval: 26    PLAN  []  Upgrade activities as tolerated     []  Continue plan of care  []  Update interventions per flow sheet       []  Discharge due to:_  []  Other:_      Dayday Schmid PT 8/20/2021  11:09 AM    Future Appointments   Date Time Provider Lian Bustamante   8/24/2021 12:45 PM Edward Nunez PT [facility]   8/27/2021 11:30 AM Aylin Beckford, PT [facility]   8/31/2021 12:45 PM Edward Nunez, PT [facility]   9/3/2021 10:45 AM Aylin Beckford, PT [facility]   6/21/2022  8:15 AM IO NURSE VISIT [facility] D[facility] AMB   6/28/2022  8:00 AM Maral Falcon MD [facility] BS AMB

## 2021-08-24 ENCOUNTER — HOSPITAL ENCOUNTER (OUTPATIENT)
Dept: PHYSICAL THERAPY | Age: 61
Discharge: HOME | End: 2021-08-24
Payer: COMMERCIAL

## 2021-08-24 PROCEDURE — 97110 THERAPEUTIC EXERCISES: CPT

## 2021-08-24 PROCEDURE — 97140 MANUAL THERAPY 1/> REGIONS: CPT

## 2021-08-24 NOTE — PROGRESS NOTES
PT DAILY TREATMENT NOTE 10-18    Patient Name: Maury De Guzman  Date:2021  : 1960  [x]  Patient  Verified  Payor: [employer] / Plan: [address] [place] [address] / Product Type: PPO /    In time:1238  Out time:123  Total Treatment Time (min): 45  Visit #: 5 of 8    Medicare/BCBS Only   Total Timed Codes (min):  35 1:1 Treatment Time:  35       Treatment Area: Achilles tendinitis, right leg [M76.61]    SUBJECTIVE  Pain Level (0-10 scale): 2  Any medication changes, allergies to medications, adverse drug reactions, diagnosis change, or new procedure performed?: [x] No    [] Yes (see summary sheet for update)  Subjective functional status/changes:   [] No changes reported  It's getting better. The pain isn't constant, and it's only tender in one spot versus the whole ankle.     OBJECTIVE    Modality rationale: decrease pain to improve the patients ability to tolerate post needle soreness   Min Type Additional Details    [] Estim:  []Unatt       []IFC  []Premod                        []Other:  []w/ice   []w/heat  Position:  Location:    [] Estim: []Att    []TENS instruct  []NMES                    []Other:  []w/US   []w/ice   []w/heat  Position:  Location:    []  Traction: [] Cervical       []Lumbar                       [] Prone          []Supine                       []Intermittent   []Continuous Lbs:  [] before manual  [] after manual    []  Ultrasound: []Continuous   [] Pulsed                           []1MHz   []3MHz W/cm2:  Location:    []  Iontophoresis with dexamethasone         Location: [] Take home patch   [] In clinic   10 [x]  Ice     []  heat  []  Ice massage  []  Laser   []  Anodyne Position:prone  Location:right gastroc    []  Laser with stim  []  Other:  Position:  Location:    []  Vasopneumatic Device Pressure:       [] lo [] med [] hi   Temperature: [] lo [] med [] hi   [] Skin assessment post-treatment:  []intact []redness- no adverse reaction    []redness - adverse reaction: 12 min Therapeutic Exercise:  [] See flow sheet :   Rationale: increase ROM to improve the patients ability to perform daily activities        23 min Manual Therapy:  IMDN to include education, assessment, set-up, palpation, hemostasis, STM/stretch to treated areas and reassessment only; stick right gastrc/soleus   The manual therapy interventions were performed at a separate and distinct time from the therapeutic activities interventions. Rationale: decrease pain, increase ROM, increase tissue extensibility and decrease trigger points to tolerate standing and walking  With   [] TE   [] TA   [] neuro   [] other: Patient Education: [x] Review HEP    [] Progressed/Changed HEP based on:   [] positioning   [] body mechanics   [] transfers   [] heat/ice application    [] other:      Other Objective/Functional Measures:      Pain Level (0-10 scale) post treatment: 4 post needle soreness    ASSESSMENT/Changes in Function: Less TrPs noted today, as the restrictions mainly exist distally in the soleus and along the achilles tendon. Continue to focus on improving mm restrictions and work on eccentric control/strength. Patient will continue to benefit from skilled PT services to modify and progress therapeutic interventions, address functional mobility deficits, address ROM deficits, address strength deficits, analyze and address soft tissue restrictions and analyze and cue movement patterns to attain remaining goals. []  See Plan of Care  []  See progress note/recertification  []  See Discharge Summary         Progress towards goals / Updated goals:  Short Term Goals: To be accomplished in 2 weeks:  1. Patient will report performance of home exercise program 4 of 7 days in the next week demonstrating compliance to therapy program and progress towards independent management of symptoms.             Eval: HEP provided               YTZOMFN: goal met 8/11/21  2.  Patient will increase right DF AROM to 5 degrees to improve gait mechanics and ease with stair negotiation.               Eval: lacking 2 degrees from neutral               XCPGFZK: able to achieve > neutral with both Prostretch and Reformer foot work 8/17/21              Long Term Goals: To be accomplished in 4 weeks:  1. Patient will report no difficulty with standing 30 minutes to improve ease with grocery shopping and overall mobility.             Eval: limited to 10 minutes currently   2. Patient will report at least 50% improvement in overall symptoms to improve overall quality of life.              Eval: 0%  3. Patient will demonstrate at least 15 seconds SLS balance on right LE on uneven surface without increase in pain to improve ease with stair negotiation and walking on uneven surfaces such as the beach.               Eval: 5 seconds on even surface with increased pain   4. Patient will increase right ankle strength grossly to 5-/5 to improve ease with overall mobility.               Eval: 4+/5 DF with increased pain, 4/5 PF with minimal pain, 4-/5 inversion and eversion without pain   5. Patient will increase FOTO survey score to 52 to improve ease with daily tasks and promote return to PLOF.                Eval: 26       PLAN  []  Upgrade activities as tolerated     []  Continue plan of care  []  Update interventions per flow sheet       []  Discharge due to:_  []  Other:_      KELECHI Brar, CMTPT 8/24/2021  9:02 AM    Future Appointments   Date Time Provider Lian Bustamante   8/24/2021 12:45 PM Annita Parry, PT [facility]   8/27/2021 11:30 AM Edrilg Haddad, PT [facility]   8/31/2021 12:45 PM Annita Parry, PT [facility]   9/3/2021 10:45 AM Edrie Catie, PT [facility]   6/21/2022  8:15 AM [facility] NURSE VISIT [facility]   6/28/2022  8:00 AM Julissa Falcon MD [facility]

## 2021-08-27 ENCOUNTER — APPOINTMENT (OUTPATIENT)
Dept: PHYSICAL THERAPY | Age: 61
End: 2021-08-27
Payer: COMMERCIAL

## 2021-08-31 ENCOUNTER — HOSPITAL ENCOUNTER (OUTPATIENT)
Dept: PHYSICAL THERAPY | Age: 61
Discharge: HOME | End: 2021-08-31
Payer: COMMERCIAL

## 2021-08-31 PROCEDURE — 20560 NDL INSJ W/O NJX 1 OR 2 MUSC: CPT

## 2021-08-31 PROCEDURE — 97140 MANUAL THERAPY 1/> REGIONS: CPT

## 2021-08-31 PROCEDURE — 97110 THERAPEUTIC EXERCISES: CPT

## 2021-08-31 NOTE — PROGRESS NOTES
PT DAILY TREATMENT NOTE 10-18    Patient Name: Edith Rivero  Date:2021  : 1960  [x]  Patient  Verified  Payor: [employer] / Plan: [address] [place] [address] / Product Type: PPO /    In time:1242  Out time:118  Total Treatment Time (min): 36  Visit #: 6 of 8    Medicare/BCBS Only   Total Timed Codes (min):  36 1:1 Treatment Time:  36       Treatment Area: Achilles tendinitis, right leg [M76.61]    SUBJECTIVE  Pain Level (0-10 scale): 2  Any medication changes, allergies to medications, adverse drug reactions, diagnosis change, or new procedure performed?: [x] No    [] Yes (see summary sheet for update)  Subjective functional status/changes:   [] No changes reported  I feel like we're getting somewhere. I just feel a burning sensation now. OBJECTIVE    24 min Therapeutic Exercise:  [] See flow sheet :   Rationale: increase ROM and increase strength to improve the patients ability to ambulate and stand without pian     10 min Manual Therapy:  IMDN to include education, assessment, set-up, palpation, hemostasis, STM/stretch to treated areas and reassessment only; stick right gastroc/soleus    The manual therapy interventions were performed at a separate and distinct time from the therapeutic activities interventions. Rationale: decrease pain, increase ROM, increase tissue extensibility and decrease trigger points to perform daily activities   2 min Dry Needling:   [x]  CPT 96913:  needle insertion(s) without injection(s); 1 or 2 muscle(s)  []  CPT 49012:  needle insertion(s) without injection(s); 3 or more muscles. Rationale: decrease pain, increase ROM, increase tissue extensibility and decrease trigger points to ambulate and stand for prolonged periods of time    Dry Needling Procedure Note    Procedure:  An intramuscular manual therapy (dry needling) and a neuro-muscular re-education treatment was done to deactivate myofascial trigger points with a 30 gauge filament needle under aseptic technique. Indications:  [x] Myofascial pain and dysfunction [] Muscled spasms  [x] Myalgia/myositis   [] Muscle cramps  [x] Muscle imbalances  [] Other:    Chart reviewed for the following:  Estefani ORTEGA PT, have reviewed the medical history, summary list and precautions/contraindications for Ralph Lindsay.   TIME OUT performed immediately prior to start of procedure:  Estefani ORTEGA PT, have performed the following reviews on Ralph Lindsay prior to the start of the session:      [x] Verified patient identification by name and date of birth    [x] Agreement on all muscles being treated was verified   [x] Purpose of dry needling, side effects, possible complications, risks and benefits were explained to the patient   [x] Procedure site(s) verified  [x] Patient was positioned for comfort and draped for privacy  [x] Informed Consent was signed (initial visit) and verified verbally (subsequent visits)  [x] Patient was instructed on the need to report the use of blood thinners and/or immunosuppressant medications  [x] How to respond to possible adverse effects of treatment  [x] Self treatment of post needling soreness: ice, heat (moist heat, heat wraps) and stretching  [x] Opportunity was given to ask any questions, all questions were answered            Time: 1242  Date of procedure: 8/31/2021    Treatment: The following muscles were treated today with intramuscular dry needling  [] Left [] Right Abdominals: Ant Rectus Abdominis  [] Left [] Right External Oblique / Internal Oblique / Transverse Abdominis  [] Left [] Right Thoracic Multifidi / Wilhelm Bud  [] Left [] Right Iliocostalis Shyam Duval / Jill Filler  [] Left [] Right Longissimus Shyam Duval / Jill Filler  [] Left [] Right Lumbar Multifidi  [] Left [] Right Serratus Posterior Inferior  [] Left [] Right Quadratus Lumborum  [] Left [] Right Psoas  [] Left [] Right Iliacus  [] Left [] Right Iliopsoas (inguinal)  [] Left [] Right Piriformis  [] Left [] Right Quadratus Femoris  [] Left [] Right Suki Ayala / Rodolfo Lair / Rhunette Hard  [] Left [] Right Obturator Internus  [] Left [] Right Obturator Externus / Superior Gemellus / Inferior Gemellus    [] Left [] Right Tensor Fasciae Vivien  [] Left [] Right Iliotibial Band  [] Left [] Right Pectineus  [] Left [] Right Sartorius  [] Left [] Right Gracilis  [] Left [] Right Adductor Brevis / Adductor Longus / Adductor Philip  [] Left [] Right Rectus Femoris / Vastus Lateralis / Vastus Intermedius / Vastus Medialis Obliquus  [] Left [] Right Tibialis Anterior / Posterior  [] Left [] Right Extensor Digitorum Longus / Brevis  [] Left [] Right Extensor Hallucis Longus / Brevis  [] Left [] Right Hamstrings: Biceps Femoris / Viky Myke / Semimembranosis  [] Left [] Right Popliteus / Planteris  [] Left [x] Right Gastrocnemius Medial / Lateral  [] Left [x] Right Soleus  [] Left [] Right Peronei: Longus / Ric Broom / Tertius  [] Left [] Right Flexor Digitorum Longus / Brevis  [] Left [] Right Flexor Hallucis Longus / Brevis  [] Left [] Right Quadratus Plantae  [] Left [] Right Abductor Digiti Minimi  [] Left [] Right Foot Interossei  [] Left [] Right Other:    Patient's response to today's treatment:  [x] Latent Twitch Response  [x] Muscle relaxation [x] Pain Relief  [x] Post needling soreness [x] without complications  [] Increased Range of Motion  [] Other:     Performed by: Estefani Keith, PT      With   [] TE   [] TA   [] neuro   [] other: Patient Education: [x] Review HEP    [] Progressed/Changed HEP based on:   [] positioning   [] body mechanics   [] transfers   [] heat/ice application    [] other:      Other Objective/Functional Measures:      Pain Level (0-10 scale) post treatment: 3    ASSESSMENT/Changes in Function: Mm restrictions persist in soleus and along distal achilles, but have improved significantly in gastroc.      Patient will continue to benefit from skilled PT services to modify and progress therapeutic interventions, address strength deficits, analyze and address soft tissue restrictions, analyze and cue movement patterns and address imbalance/dizziness to attain remaining goals. []  See Plan of Care  []  See progress note/recertification  []  See Discharge Summary         Progress towards goals / Updated goals:  Short Term Goals: To be accomplished in 2 weeks:  1. Patient will report performance of home exercise program 4 of 7 days in the next week demonstrating compliance to therapy program and progress towards independent management of symptoms.             Eval: HEP provided               EGPVBFB: goal met 8/11/21  2. Patient will increase right DF AROM to 5 degrees to improve gait mechanics and ease with stair negotiation.               Eval: lacking 2 degrees from neutral               [id number]: able to achieve > neutral with both Prostretch and Reformer foot work 8/17/21              Long Term Goals: To be accomplished in 4 weeks:  1. Patient will report no difficulty with standing 30 minutes to improve ease with grocery shopping and overall mobility.             Eval: limited to 10 minutes currently    Current: able to stand x 30' with minimal pain 8/31/21  2. Patient will report at least 50% improvement in overall symptoms to improve overall quality of life.              Eval: 0%  3. Patient will demonstrate at least 15 seconds SLS balance on right LE on uneven surface without increase in pain to improve ease with stair negotiation and walking on uneven surfaces such as the beach.               Eval: 5 seconds on even surface with increased pain   4. Patient will increase right ankle strength grossly to 5-/5 to improve ease with overall mobility.               Eval: 4+/5 DF with increased pain, 4/5 PF with minimal pain, 4-/5 inversion and eversion without pain   5. Patient will increase FOTO survey score to 52 to improve ease with daily tasks and promote return to PLOF.                Eval: 26    PLAN  [x]  Upgrade activities as tolerated     []  Continue plan of care  []  Update interventions per flow sheet       []  Discharge due to:_  []  Other:_      Rosalba Prudent, KELECHI, CMTPT 8/31/2021  8:14 AM    Future Appointments   Date Time Provider Lian Bustamante   8/31/2021 12:45 PM [facility], PT [facility]   9/3/2021 10:45 AM Omayra Awad, PT [facility]   6/21/2022  8:15 AM [facility] NURSE VISIT [facility]   6/28/2022  8:00 AM Domingo Falcon MD [facility] AM[facility]

## 2021-09-14 ENCOUNTER — HOSPITAL ENCOUNTER (OUTPATIENT)
Dept: PHYSICAL THERAPY | Age: 61
Discharge: HOME | End: 2021-09-14
Payer: COMMERCIAL

## 2021-09-14 PROCEDURE — 97140 MANUAL THERAPY 1/> REGIONS: CPT

## 2021-09-14 PROCEDURE — 97112 NEUROMUSCULAR REEDUCATION: CPT

## 2021-09-14 PROCEDURE — 97110 THERAPEUTIC EXERCISES: CPT

## 2021-09-14 NOTE — PROGRESS NOTES
PT DAILY TREATMENT NOTE     Patient Name: Casi Metzger  Date:2021  : 1960  [x]  Patient  Verified  Payor: BLUE CROSS / Plan: [address] [place] [address] / Product Type: PPO /    In time:12:45  Out time:1:28  Total Treatment Time (min): 43  Visit #: 7 of 8    Medicare/BCBS Only   Total Timed Codes (min):  43 1:1 Treatment Time:  43       Treatment Area: Achilles tendinitis, right leg [M76.61]    SUBJECTIVE  Pain Level (0-10 scale): 2/10  Any medication changes, allergies to medications, adverse drug reactions, diagnosis change, or new procedure performed?: [x] No    [] Yes (see summary sheet for update)  Subjective functional status/changes:   [] No changes reported  The patient reports that she feels 50% improved with her achilles, and that her pain is more localized, and it is more of a \"burning pain\" now. OBJECTIVE  13 min Therapeutic Exercise:  [x] See flow sheet :   Rationale: increase ROM and increase strength to improve the patients ability to improve ADL ease. 15 min Neuromuscular Re-education:  []  See flow sheet :   Rationale: increase ROM, increase strength and improve coordination  to improve the patients ability to improve ADL ease. 15 min Manual Therapy:  Graston Technique Treatment Intervention:    Patient positioning: prone    Treatment location: right achilles, soleus, gastroc region    Graston Technique Instruments utilized: GT5, GT3, GT6    Explained effects and benefits of Graston Technique, including potential for post treatment soreness and bruising. Patient was provided the opportunity to ask any questions and verbalized understanding. Patient wished to proceed with treatment. The manual therapy interventions were performed at a separate and distinct time from the therapeutic activities interventions. Rationale: decrease pain, increase ROM and increase tissue extensibility to improve ADL ease.       With   [] TE   [] TA   [] neuro   [] other: Patient Education: [x] Review HEP    [] Progressed/Changed HEP based on:   [] positioning   [] body mechanics   [] transfers   [] heat/ice application    [] other:      Other Objective/Functional Measures:   Single leg heel raise: unable     FOTO: 35     Progressed to single leg stance at 15\" on even ground    Pain Level (0-10 scale) post treatment: 0/10    ASSESSMENT/Changes in Function: FOTO score progressed to 35. Subjective improvement to 50%. Her strength is improving. She is unable to perform single leg heel raise of right, with left noting to be 7.5 mm heel elevation single leg HR. She will continue to benefit from skilled PT with an emphasis of eccentric strengthening as well as graston treatment to the right achilles. Patient will continue to benefit from skilled PT services to modify and progress therapeutic interventions, address functional mobility deficits, address ROM deficits, address strength deficits, analyze and address soft tissue restrictions, analyze and cue movement patterns, analyze and modify body mechanics/ergonomics, assess and modify postural abnormalities and instruct in home and community integration to attain remaining goals. []  See Plan of Care  []  See progress note/recertification  []  See Discharge Summary         Progress towards goals / Updated goals:  Short Term Goals: To be accomplished in 2 weeks:  1. Patient will report performance of home exercise program 4 of 7 days in the next week demonstrating compliance to therapy program and progress towards independent management of symptoms.             Eval: HEP provided               AUBUILJ: goal met 8/11/21  2.  Patient will increase right DF AROM to 5 degrees to improve gait mechanics and ease with stair negotiation.               Eval: lacking 2 degrees from neutral               VZUJIUH: able to achieve > neutral with both Prostretch and Reformer foot work 8/17/21              Long Term Goals: To be accomplished in 4 weeks: 1. Patient will report no difficulty with standing 30 minutes to improve ease with grocery shopping and overall mobility.             Eval: limited to 10 minutes currently               Current: able to stand x 30' with minimal pain 8/31/21  2. Patient will report at least 50% improvement in overall symptoms to improve overall quality of life.              Eval: 0%   Current: 50% 9/14/2021  3. Patient will demonstrate at least 15 seconds SLS balance on right LE on uneven surface without increase in pain to improve ease with stair negotiation and walking on uneven surfaces such as the beach.               Eval: 5 seconds on even surface with increased pain   Current: Progressed to 15\" but on even ground   4. Patient will increase right ankle strength grossly to 5-/5 to improve ease with overall mobility.               Eval: 4+/5 DF with increased pain, 4/5 PF with minimal pain, 4-/5 inversion and eversion without pain   5. Patient will increase FOTO survey score to 52 to improve ease with daily tasks and promote return to PLOF.                Eval: 26   Current: 35        PLAN  []  Upgrade activities as tolerated     []  Continue plan of care  []  Update interventions per flow sheet       []  Discharge due to:_  []  Other:_      Haritha Ortiz, PT 9/14/2021  1:11 PM    Future Appointments   Date Time Provider Lian Bustamante   6/21/2022  8:15 AM [facility] NURSE VISIT [facility]   6/28/2022  8:00 AM Hipolito Moffett MD [facility]

## 2021-09-14 NOTE — PROGRESS NOTES
[facility]  [address]  [place], [address]  [phone] fax    Physical Therapy Progress Note  Patient name: Titus Espinoza Start of Care: 2021   Referral source: Estella Litten, DPM : 1960                Medical Diagnosis: Achilles tendinitis, right leg [M76.61]  Payor: [employer] / Plan: [address] [place] [address] / Product Type: PPO /  Onset Date: 2020                Treatment Diagnosis: right ankle pain    Prior Hospitalization: see medical history Provider#: [id number]   Medications: Verified on Patient summary List    Comorbidities: Arthritis, High Blood Pressure, Other disorders, Prior Surgery, Sleep dysfunction   Prior Level of Function: no limitations, walking ~2 miles \"a couple times a week\"                  Visits from Start of Care: 7    Missed Visits: 0    Key Functional Changes:   Short Term Goals: To be accomplished in 2 weeks:  1. Patient will report performance of home exercise program 4 of 7 days in the next week demonstrating compliance to therapy program and progress towards independent management of symptoms.             Eval: HEP provided               KAOECJV: goal met 21  2. Patient will increase right DF AROM to 5 degrees to improve gait mechanics and ease with stair negotiation.               Eval: lacking 2 degrees from neutral               YORQKNH: able to achieve > neutral with both Prostretch and Reformer foot work 21              Long Term Goals: To be accomplished in 4 weeks:  1. Patient will report no difficulty with standing 30 minutes to improve ease with grocery shopping and overall mobility.             Eval: limited to 10 minutes currently               ASGBPVT: able to stand x 30' with minimal pain 21  2.  Patient will report at least 50% improvement in overall symptoms to improve overall quality of life.              Eval: 0%              Current: 50% 9/14/2021  3. Patient will demonstrate at least 15 seconds SLS balance on right LE on uneven surface without increase in pain to improve ease with stair negotiation and walking on uneven surfaces such as the beach.               Eval: 5 seconds on even surface with increased pain              Current: Progressed to 15\" but on even ground   4. Patient will increase right ankle strength grossly to 5-/5 to improve ease with overall mobility.               Eval: 4+/5 DF with increased pain, 4/5 PF with minimal pain, 4-/5 inversion and eversion without pain   5. Patient will increase FOTO survey score to 52 to improve ease with daily tasks and promote return to PLOF.                Eval: 26              Current: 35     Updated Goals: to be achieved in 4 weeks:  1. Patient will demonstrate at least 15 seconds SLS balance on right LE on uneven surface without increase in pain to improve ease with stair negotiation and walking on uneven surfaces such as the beach.               AB Progressed to 15\" but on even ground   2. Patient will increase right ankle strength grossly to 5-/5 to improve ease with overall mobility.               Eval: 4+/5 DF with increased pain, 4/5 PF with minimal pain, 4-/5 inversion and eversion without pain   3. Patient will increase FOTO survey score to 52 to improve ease with daily tasks and promote return to PLOF.                PN: 35   3. The patient will demonstrate right heel eccentric heel raise 3 cm heel excursion to improve ease of ambulation. PN:  0 cm    ASSESSMENT/RECOMMENDATIONS: FOTO score progressed to 35. Subjective improvement to 50%. Her strength is improving. She is unable to perform single leg heel raise of right, with left noting to be 7.5 mm heel elevation single leg HR. She will continue to benefit from skilled PT with an emphasis of eccentric strengthening as well as graston treatment to the right achilles.      [x]Continue therapy per initial plan/protocol at a frequency of 2 x per week for 4 weeks  []Continue therapy with the following recommended changes:_____________________      _____________________________________________________________________  []Discontinue therapy progressing towards or have reached established goals  []Discontinue therapy due to lack of appreciable progress towards goals  []Discontinue therapy due to lack of attendance or compliance  []Await Physician's recommendations/decisions regarding therapy  []Other:________________________________________________________________    Thank you for this referral.    Anmol Chan, PT 9/14/2021 2:15 PM  NOTE TO PHYSICIAN:  [address]   [facility]: [phone]  If you are unable to process this request in 24 hours please contact our office: [phone]    ? I have read the above report and request that my patient continue as recommended. ? I have read the above report and request that my patient continue therapy with the following changes/special instructions:__________________________________________________________  ? I have read the above report and request that my patient be discharged from therapy.     Physicians signature: ______________________________Date: ______Time:______     Estella Litten, DPM

## 2021-09-15 ENCOUNTER — HOSPITAL ENCOUNTER (OUTPATIENT)
Dept: GENERAL RADIOLOGY | Age: 61
Discharge: HOME | End: 2021-09-15
Payer: COMMERCIAL

## 2021-09-15 DIAGNOSIS — M06.09 POLYARTHRITIS WITH NEGATIVE RHEUMATOID FACTOR (HCC): ICD-10-CM

## 2021-09-15 PROCEDURE — 71046 X-RAY EXAM CHEST 2 VIEWS: CPT

## 2021-09-16 ENCOUNTER — HOSPITAL ENCOUNTER (OUTPATIENT)
Dept: PHYSICAL THERAPY | Age: 61
Discharge: HOME | End: 2021-09-16
Payer: COMMERCIAL

## 2021-09-16 PROCEDURE — 97140 MANUAL THERAPY 1/> REGIONS: CPT

## 2021-09-16 PROCEDURE — 97112 NEUROMUSCULAR REEDUCATION: CPT

## 2021-09-16 NOTE — PROGRESS NOTES
PT DAILY TREATMENT NOTE     Patient Name: Nani Roberson  Date:2021  : 1960  [x]  Patient  Verified  Payor: BLUE CROSS / Plan: [address] [place] [address] / Product Type: PPO /    In time:1:47  Out time:2:25  Total Treatment Time (min): 38  Visit #: 1 of 8    Medicare/BCBS Only   Total Timed Codes (min):  36 1:1 Treatment Time:  36       Treatment Area: Achilles tendinitis, right leg [M76.61]    SUBJECTIVE  Pain Level (0-10 scale): 2/10  Any medication changes, allergies to medications, adverse drug reactions, diagnosis change, or new procedure performed?: [x] No    [] Yes (see summary sheet for update)  Subjective functional status/changes:   [] No changes reported  \"I think the dry-needling and the Graston are helping. \"    OBJECTIVE    10 min Therapeutic Exercise:  [x] See flow sheet :   Rationale: increase ROM and increase strength to improve the patients ability to perform ADL's. 18 min Neuromuscular Re-education:  [x]  See flow sheet : SLS, hip hinge with dowel and 1/2 foam roll, reformer series. Rationale: increase strength, improve coordination, improve balance and increase proprioception  to improve the patients ability to perform functional activities. 8 min Manual Therapy:  Graston technique to Right gastroc, soleus, achilles tendon and plantar fascia. Pt prone. The manual therapy interventions were performed at a separate and distinct time from the therapeutic activities interventions. Rationale: decrease pain, increase ROM, increase tissue extensibility and decrease trigger points to normalize gait pattern and ease of performing functional activities. With   [x] TE   [] TA   [] neuro   [] other: Patient Education: [x] Review HEP    [] Progressed/Changed HEP based on:   [] positioning   [] body mechanics   [] transfers   [] heat/ice application    [] other:      Other Objective/Functional Measures: Added Reformer footwork with 2 red springs of resistance. Increased SLS to 30\", required handrail assist 3x during the 30\". Pain Level (0-10 scale) post treatment: 1/10    ASSESSMENT/Changes in Function: Pt reports improved ease with ambulation, taking longer steps than previously. Pain level decreased from 2/10 to 1/10 post-treatment. Noted restrictions in Right achilles and a trigger point in Right lateral gastroc head. Continue PT to decrease mm restrictions and increased Right ankle strength to improve ease of performing functional activities. Patient will continue to benefit from skilled PT services to modify and progress therapeutic interventions, address functional mobility deficits, address ROM deficits, address strength deficits, analyze and address soft tissue restrictions, analyze and cue movement patterns and analyze and modify body mechanics/ergonomics to attain remaining goals. [x]  See Plan of Care  []  See progress note/recertification  []  See Discharge Summary         Progress towards goals / Updated goals:  Updated Goals: to be achieved in 4 weeks:  1. Patient will demonstrate at least 15 seconds SLS balance on right LE on uneven surface without increase in pain to improve ease with stair negotiation and walking on uneven surfaces such as the beach.               NZ Progressed to 15\" but on even ground   2. Patient will increase right ankle strength grossly to 5-/5 to improve ease with overall mobility.               Eval: 4+/5 DF with increased pain, 4/5 PF with minimal pain, 4-/5 inversion and eversion without pain   3. Patient will increase FOTO survey score to 52 to improve ease with daily tasks and promote return to PLOF.                PN: 35%    3. The patient will demonstrate right heel eccentric heel raise 3 cm heel excursion to improve ease of ambulation.                PN: 0 cm    PLAN  []  Upgrade activities as tolerated     [x]  Continue plan of care  []  Update interventions per flow sheet       []  Discharge due to:_  []  Other:_ April Talamantes, PTA 9/16/2021  1:34 PM    Future Appointments   Date Time Provider Lian Bustamante   9/16/2021  1:45 PM Brooks Ohara, PTA [facility]   9/24/2021  2:30 PM Jude LOCKWOOD, PT [facility]   9/28/2021 11:15 AM Hugo Awad, PT [facility]   10/1/2021 10:00 AM Julia Balzarine, PT [facility]   10/5/2021 11:15 AM Hugo Awad, PT [facility]   10/8/2021  1:00 PM Julia Balzarine, PT [facility]   10/19/2021 11:15 AM Hugo Awad, PT [facility]   10/22/2021 10:00 AM Julia Balzarine, PT [facility]   10/26/2021 11:15 AM Hugo Awad, PT [facility]   6/21/2022  8:15 AM [facility] NURSE VISIT [facility]   6/28/2022  8:00 AM Familia Falcon MD [facility]

## 2021-09-24 ENCOUNTER — HOSPITAL ENCOUNTER (OUTPATIENT)
Dept: PHYSICAL THERAPY | Age: 61
Discharge: HOME | End: 2021-09-24
Payer: COMMERCIAL

## 2021-09-24 PROCEDURE — 97112 NEUROMUSCULAR REEDUCATION: CPT

## 2021-09-24 PROCEDURE — 97140 MANUAL THERAPY 1/> REGIONS: CPT

## 2021-09-24 PROCEDURE — 97110 THERAPEUTIC EXERCISES: CPT

## 2021-09-24 NOTE — PROGRESS NOTES
PT DAILY TREATMENT NOTE     Patient Name: Red Romero  Date:2021  : 1960  [x]  Patient  Verified  Payor: [employer] / Plan: [address] [place] [address] / Product Type: PPO /    In time: 2:37  Out time:3:18  Total Treatment Time (min): 41  Visit #: 2 of 8    Medicare/BCBS Only   Total Timed Codes (min):  41 1:1 Treatment Time:  41       Treatment Area: Achilles tendinitis, right leg [M76.61]    SUBJECTIVE  Pain Level (0-10 scale): 1-2/10  Any medication changes, allergies to medications, adverse drug reactions, diagnosis change, or new procedure performed?: [x] No    [] Yes (see summary sheet for update)  Subjective functional status/changes:   [] No changes reported  The patient reports that she remains compliant with HEP. She states she was sore for 2-3 days after last session. OBJECTIVE  17 min Therapeutic Exercise:  [x] See flow sheet :   Rationale: increase ROM and increase strength to improve the patients ability to improve ADL ease. 12 min Neuromuscular Re-education:  [x]  See flow sheet :   Rationale: improve coordination, improve balance and increase proprioception  to improve the patients ability to improve ADL ease. 12 min Manual Therapy:  Graston Technique Treatment Intervention:    Patient positioning: prone    Treatment location: achilles, soleus, gastroc right    Graston Technique Instruments utilized: GT 6, GT3, GT5    Explained effects and benefits of Graston Technique, including potential for post treatment soreness and bruising. Patient was provided the opportunity to ask any questions and verbalized understanding. Patient wished to proceed with treatment. The manual therapy interventions were performed at a separate and distinct time from the therapeutic activities interventions. Rationale: decrease pain, increase ROM and increase tissue extensibility to improve ADL ease.          With   [] TE   [] TA   [] neuro   [] other: Patient Education: [x] Review HEP    [] Progressed/Changed HEP based on:   [] positioning   [] body mechanics   [] transfers   [] heat/ice application    [] other:      Other Objective/Functional Measures:   Still unable to HR, but able to progress HR eccentric lowering to 75% weight bearing. Pain Level (0-10 scale) post treatment: 1-2/10    ASSESSMENT/Changes in Function: The patient is progressing slowly with regards to gastroc strengthening. She continues to demonstrate tenderness and pain of her distal medial aspect of achilles insertion. She left the clinic in no greater pain than arrival and continues to report compliance with HEP. Advised the patient to progress towards 85-90% of weight bearing into eccentric phase of lowering as tolerated. Patient will continue to benefit from skilled PT services to modify and progress therapeutic interventions, address functional mobility deficits, address ROM deficits, address strength deficits, analyze and address soft tissue restrictions, analyze and cue movement patterns, analyze and modify body mechanics/ergonomics, assess and modify postural abnormalities and instruct in home and community integration to attain remaining goals. []  See Plan of Care  []  See progress note/recertification  []  See Discharge Summary         Progress towards goals / Updated goals:  1. Patient will demonstrate at least 15 seconds SLS balance on right LE on uneven surface without increase in pain to improve ease with stair negotiation and walking on uneven surfaces such as the beach.               EP Progressed to 15\" but on even ground   2. Patient will increase right ankle strength grossly to 5-/5 to improve ease with overall mobility.               Eval: 4+/5 DF with increased pain, 4/5 PF with minimal pain, 4-/5 inversion and eversion without pain   3. Patient will increase FOTO survey score to 52 to improve ease with daily tasks and promote return to PLOF.                PN: 35   3.  The patient will demonstrate right heel eccentric heel raise 3 cm heel excursion to improve ease of ambulation.                PN:  0 cm   Current: still unable to HR 9/24/2021     PLAN  []  Upgrade activities as tolerated     [x]  Continue plan of care  []  Update interventions per flow sheet       []  Discharge due to:_  []  Other:_      Yoko Modesto, PT 9/24/2021  3:03 PM    Future Appointments   Date Time Provider Lian Dianna   9/28/2021 11:15 AM Román Awad, PT [facility]   10/1/2021 10:00 AM Graceville Ridges, PT [facility]   10/5/2021 11:15 AM [place] Ridges, PT [facility]   10/8/2021  1:00 PM Omayra Ridges, PT [facility]   10/19/2021 11:15 AM Omayra Ridges, PT [facility]   10/22/2021 10:00 AM Omayra Ridges, PT [facility]   10/26/2021 11:15 AM Omayra Ridges, PT [facility]   6/21/2022  8:15 AM [facility] VISIT [facility]   6/28/2022  8:00 AM Tatum Falcon MD [facility]

## 2021-09-28 ENCOUNTER — HOSPITAL ENCOUNTER (OUTPATIENT)
Dept: PHYSICAL THERAPY | Age: 61
Discharge: HOME | End: 2021-09-28
Payer: COMMERCIAL

## 2021-09-28 PROCEDURE — 97112 NEUROMUSCULAR REEDUCATION: CPT

## 2021-09-28 PROCEDURE — 97140 MANUAL THERAPY 1/> REGIONS: CPT

## 2021-09-28 PROCEDURE — 97110 THERAPEUTIC EXERCISES: CPT

## 2021-09-28 NOTE — PROGRESS NOTES
PT DAILY TREATMENT NOTE     Patient Name: Mark Ignacio  Date:2021  : 1960  [x]  Patient  Verified  Payor: Josr Núñez / Plan: [address] [place] [address] / Product Type: PPO /    In time:11:15  Out time:11:53  Total Treatment Time (min): 38  Visit #: 3 of 8    Medicare/BCBS Only   Total Timed Codes (min):  38 1:1 Treatment Time:  38       Treatment Area: Achilles tendinitis, right leg [M76.61]    SUBJECTIVE  Pain Level (0-10 scale): 1/10  Any medication changes, allergies to medications, adverse drug reactions, diagnosis change, or new procedure performed?: [x] No    [] Yes (see summary sheet for update)  Subjective functional status/changes:   [] No changes reported  The patient states her achilles did feel good for a few days following. OBJECTIVE  14 min Therapeutic Exercise:  [] See flow sheet :   Rationale: increase ROM and increase strength to improve the patients ability to improve ADL ease. 12 min Neuromuscular Re-education:  [x]  See flow sheet :   Rationale: improve coordination, improve balance and increase proprioception  to improve the patients ability to improve ADL ease. 12 min Manual Therapy:   Graston Technique Treatment Intervention:     Patient positioning: prone     Treatment location: achilles, soleus, gastroc right     Graston Technique Instruments utilized: GT 6, GT3, GT5     Explained effects and benefits of Graston Technique, including potential for post treatment soreness and bruising. Patient was provided the opportunity to ask any questions and verbalized understanding. Patient wished to proceed with treatment.       The manual therapy interventions were performed at a separate and distinct time from the therapeutic activities interventions. Rationale: decrease pain, increase ROM, increase tissue extensibility and decrease trigger points to improve ADL ease.          With   [] TE   [] TA   [] neuro   [] other: Patient Education: [x] Review HEP    [] Progressed/Changed HEP based on:   [] positioning   [] body mechanics   [] transfers   [] heat/ice application    [] other:      Other Objective/Functional Measures:   Continued difficulty with isometric holds in standing of gastroc strength. Airex SLS: About 5 seconds. Pain Level (0-10 scale) post treatment: 2/10    ASSESSMENT/Changes in Function: The patient was able to attain 5\" seconds single leg stance on airex. She continues to demonstrate limited strength of her gastroc/soleus group of her right with being unable to perform single leg heel raise. She will continue to benefit from skilled PT in order to progress strength and decrease symptoms. Patient will continue to benefit from skilled PT services to modify and progress therapeutic interventions, address functional mobility deficits, address ROM deficits, address strength deficits, analyze and address soft tissue restrictions, analyze and cue movement patterns, analyze and modify body mechanics/ergonomics, assess and modify postural abnormalities, address imbalance/dizziness and instruct in home and community integration to attain remaining goals. []  See Plan of Care  []  See progress note/recertification  []  See Discharge Summary         Progress towards goals / Updated goals:  1. Patient will demonstrate at least 15 seconds SLS balance on right LE on uneven surface without increase in pain to improve ease with stair negotiation and walking on uneven surfaces such as the beach.               PN Progressed to 15\" but on even ground   2. Patient will increase right ankle strength grossly to 5-/5 to improve ease with overall mobility.               Eval: 4+/5 DF with increased pain, 4/5 PF with minimal pain, 4-/5 inversion and eversion without pain   3. Patient will increase FOTO survey score to 52 to improve ease with daily tasks and promote return to PLOF.                PN: 35   3.  The patient will demonstrate right heel eccentric heel raise 3 cm heel excursion to improve ease of ambulation.               PN:  0 cm              Current: still unable to HR 9/24/2021     PLAN  []  Upgrade activities as tolerated     [x]  Continue plan of care  []  Update interventions per flow sheet       []  Discharge due to:_  []  Other:_      Bárbara Husbands, PT 9/28/2021  11:23 AM    Future Appointments   Date Time Provider Lian Bustamante   10/1/2021 10:00 AM Joanne Awad, PT [facility]   10/5/2021 11:15 AM Joanne Awad, PT [facility]   10/8/2021  1:00 PM Edna Ford, PT [facility]   10/19/2021 11:15 AM Edna Ford, PT [facility]   10/22/2021 10:00 AM Edna Ford, PT [facility]   10/26/2021 11:15 AM Edna Ford, PT [facility]   6/21/2022  8:15 AM [facility]   6/28/2022  8:00 AM Curt Falcon MD [facility]

## 2021-10-01 ENCOUNTER — HOSPITAL ENCOUNTER (OUTPATIENT)
Dept: PHYSICAL THERAPY | Age: 61
Discharge: HOME | End: 2021-10-01
Payer: COMMERCIAL

## 2021-10-01 PROCEDURE — 97140 MANUAL THERAPY 1/> REGIONS: CPT

## 2021-10-01 PROCEDURE — 97110 THERAPEUTIC EXERCISES: CPT

## 2021-10-01 PROCEDURE — 97112 NEUROMUSCULAR REEDUCATION: CPT

## 2021-10-01 NOTE — PROGRESS NOTES
PT DAILY TREATMENT NOTE     Patient Name: Anshul Rodriguez  Date:10/1/2021  : 1960  [x]  Patient  Verified  Payor: [employer] / Plan: [address] [place] [id number] / Product Type: PPO /    In time:10:00  Out time:10:42  Total Treatment Time (min): 42  Visit #: 4 of 8    Medicare/BCBS Only   Total Timed Codes (min):  42 1:1 Treatment Time:  42       Treatment Area: Achilles tendinitis, right leg [M76.61]    SUBJECTIVE  Pain Level (0-10 scale): 1/10  Any medication changes, allergies to medications, adverse drug reactions, diagnosis change, or new procedure performed?: [x] No    [] Yes (see summary sheet for update)  Subjective functional status/changes:   [] No changes reported  The patient reports that she is able to go down stairs without hobbling now. OBJECTIVE  15 min Therapeutic Exercise:  [] See flow sheet :   Rationale: increase ROM and increase strength to improve the patients ability to improve ADL ease. 15 min Neuromuscular Re-education:  []  See flow sheet :   Rationale: increase ROM, increase strength and improve coordination  to improve the patients ability to improve ADL ease. 12 min Manual Therapy:  Graston Technique Treatment Intervention:     Patient positioning: prone     Treatment location: achilles, soleus, gastroc right, achilles insertion     Graston Technique Instruments utilized: GT 6, GT3, GT5     Explained effects and benefits of Graston Technique, including potential for post treatment soreness and bruising.  Patient was provided the opportunity to ask any questions and verbalized understanding.  Patient wished to proceed with treatment. The manual therapy interventions were performed at a separate and distinct time from the therapeutic activities interventions. Rationale: decrease pain, increase ROM and increase tissue extensibility to improve ADL ease.        With   [] TE   [] TA   [] neuro   [] other: Patient Education: [x] Review HEP    [] Progressed/Changed HEP based on:   [] positioning   [] body mechanics   [] transfers   [] heat/ice application    [] other:      Other Objective/Functional Measures:      Pain Level (0-10 scale) post treatment: 1/10    ASSESSMENT/Changes in Function: The patient continues to report positive response to sessions, now indicating she is able to negotiate stairs with greater ease. Continue PT with emphasis of continued eccentric strengthening of calf complex with graston to right achilles     Patient will continue to benefit from skilled PT services to modify and progress therapeutic interventions, address functional mobility deficits, address ROM deficits, address strength deficits, analyze and address soft tissue restrictions, analyze and cue movement patterns, analyze and modify body mechanics/ergonomics, assess and modify postural abnormalities, address imbalance/dizziness and instruct in home and community integration to attain remaining goals. []  See Plan of Care  []  See progress note/recertification  []  See Discharge Summary         Progress towards goals / Updated goals:  1. Patient will demonstrate at least 15 seconds SLS balance on right LE on uneven surface without increase in pain to improve ease with stair negotiation and walking on uneven surfaces such as the beach.               PN Progressed to 15\" but on even ground   2. Patient will increase right ankle strength grossly to 5-/5 to improve ease with overall mobility.               Eval: 4+/5 DF with increased pain, 4/5 PF with minimal pain, 4-/5 inversion and eversion without pain   3. Patient will increase FOTO survey score to 52 to improve ease with daily tasks and promote return to PLOF.                PN: 35   3.  The patient will demonstrate right heel eccentric heel raise 3 cm heel excursion to improve ease of ambulation.               PN:  0 cm              Current: still unable to HR 9/24/2021     PLAN  []  Upgrade activities as tolerated [x]  Continue plan of care  []  Update interventions per flow sheet       []  Discharge due to:_  []  Other:_      Oksana Mccann, PT 10/1/2021  10:28 AM    Future Appointments   Date Time Provider Lian Bustamante   10/5/2021 11:15 AM Armani Awad, PT [facility]   10/8/2021  1:00 PM Donnita May, PT [facility]   10/19/2021 11:15 AM Armani Awad, PT [facility]   10/22/2021 10:00 AM Donnita May, PT [facility]   10/26/2021 11:15 AM Donnigalina May, PT [facility]   6/21/2022  8:15 AM [facility]   6/28/2022  8:00 AM Fouzia Falcon MD [facility]

## 2021-10-05 ENCOUNTER — HOSPITAL ENCOUNTER (OUTPATIENT)
Dept: PHYSICAL THERAPY | Age: 61
Discharge: HOME | End: 2021-10-05
Payer: COMMERCIAL

## 2021-10-05 PROCEDURE — 97110 THERAPEUTIC EXERCISES: CPT

## 2021-10-05 PROCEDURE — 97112 NEUROMUSCULAR REEDUCATION: CPT

## 2021-10-05 PROCEDURE — 97140 MANUAL THERAPY 1/> REGIONS: CPT

## 2021-10-05 NOTE — PROGRESS NOTES
PT DAILY TREATMENT NOTE     Patient Name: Jesus Barrett  Date:10/5/2021  : 1960  [x]  Patient  Verified  Payor: [employer] / Plan: [address] [place] [address] / Product Type: PPO /    In time:11:13  Out time:11:53  Total Treatment Time (min): 40  Visit #: 5 of 8    Medicare/BCBS Only   Total Timed Codes (min):  40 1:1 Treatment Time:  40       Treatment Area: Achilles tendinitis, right leg [M76.61]    SUBJECTIVE  Pain Level (0-10 scale): 1/10  Any medication changes, allergies to medications, adverse drug reactions, diagnosis change, or new procedure performed?: [x] No    [] Yes (see summary sheet for update)  Subjective functional status/changes:   [] No changes reported  The patient states that she continues to experience gradual improvement continuing to indicate stairs have improved. OBJECTIVE  13 min Therapeutic Exercise:  [] See flow sheet :   Rationale: increase ROM and increase strength to improve the patients ability to improve ADL ease. 15 min Neuromuscular Re-education:  []  See flow sheet :   Rationale: increase ROM and increase strength  to improve the patients ability to improve ADL ease. 12 min Manual Therapy:  Graston Technique Treatment Intervention:     Patient positioning: prone     Treatment location: achilles, soleus, gastroc right, achilles insertion     Graston Technique Instruments utilized: GT 6, GT3, GT5     Explained effects and benefits of Graston Technique, including potential for post treatment soreness and bruising.  Patient was provided the opportunity to ask any questions and verbalized understanding.  Patient wished to proceed with treatment. The manual therapy interventions were performed at a separate and distinct time from the therapeutic activities interventions. Rationale: decrease pain, increase ROM and increase tissue extensibility to improve ADL ease.        With   [] TE   [] TA   [] neuro   [] other: Patient Education: [x] Review HEP [] Progressed/Changed HEP based on:   [] positioning   [] body mechanics   [] transfers   [] heat/ice application    [] other:      Other Objective/Functional Measures:   SLS: 9\" on airex   Slight elevation of heel upon HR noting slight improvement. Pain Level (0-10 scale) post treatment: 3/10    ASSESSMENT/Changes in Function: The patient has progressed to 9\" single leg stance on airex. Her heel raise has progressed to slight elevation. She is making slow, but gradual progress regarding her symptoms and will continue to benefit from PT. Patient will continue to benefit from skilled PT services to modify and progress therapeutic interventions, address functional mobility deficits, address ROM deficits, address strength deficits, analyze and address soft tissue restrictions, analyze and cue movement patterns, analyze and modify body mechanics/ergonomics, assess and modify postural abnormalities and instruct in home and community integration to attain remaining goals. []  See Plan of Care  []  See progress note/recertification  []  See Discharge Summary         Progress towards goals / Updated goals:  1. Patient will demonstrate at least 15 seconds SLS balance on right LE on uneven surface without increase in pain to improve ease with stair negotiation and walking on uneven surfaces such as the beach.               PN Progressed to 15\" but on even ground   Current: 9\"    2. Patient will increase right ankle strength grossly to 5-/5 to improve ease with overall mobility.                Eval: 4+/5 DF with increased pain, 4/5 PF with minimal pain, 4-/5 inversion and eversion without pain   3. Patient will increase FOTO survey score to 52 to improve ease with daily tasks and promote return to PLOF.                PN: 35  3.  The patient will demonstrate right heel eccentric heel raise 3 cm heel excursion to improve ease of ambulation.              PN:  0 cm              Current: Very slight heel raise apparent 10/05/2021    PLAN  []  Upgrade activities as tolerated     [x]  Continue plan of care  []  Update interventions per flow sheet       []  Discharge due to:_  []  Other:_      Jacklyn Ferrell, PT 10/5/2021  11:42 AM    Future Appointments   Date Time Provider Lian Bustamante   10/8/2021  1:00 PM Kirstin Rivas, PT [facility]   10/15/2021  2:30 PM Heydi LOCKWOOD, PT [facility]   10/19/2021 11:15 AM Kirstin Rivas, PT [facility]   10/22/2021 10:00 AM Kirstin Rivas, PT [facility]   10/26/2021 11:15 AM Kirstin Rivas, PT [facility]   6/21/2022  8:15 AM [facility] VIS[facility]   6/28/2022  8:00 AM Melia Falcon MD [facility]

## 2021-10-08 ENCOUNTER — APPOINTMENT (OUTPATIENT)
Dept: PHYSICAL THERAPY | Age: 61
End: 2021-10-08
Payer: COMMERCIAL

## 2021-10-15 ENCOUNTER — HOSPITAL ENCOUNTER (OUTPATIENT)
Dept: PHYSICAL THERAPY | Age: 61
Discharge: HOME | End: 2021-10-15
Payer: COMMERCIAL

## 2021-10-15 PROCEDURE — 97140 MANUAL THERAPY 1/> REGIONS: CPT

## 2021-10-15 PROCEDURE — 97110 THERAPEUTIC EXERCISES: CPT

## 2021-10-15 PROCEDURE — 97112 NEUROMUSCULAR REEDUCATION: CPT

## 2021-10-15 NOTE — PROGRESS NOTES
PT DAILY TREATMENT NOTE     Patient Name: Renetta Godwin  Date:10/15/2021  : 1960  [x]  Patient  Verified  Payor: Delisa Carmona / Plan: [address] [place] [address] / Product Type: PPO /    In time:2:13  Out time:2:54  Total Treatment Time (min): 41  Visit #: 6 of 8    Medicare/BCBS Only   Total Timed Codes (min):  41 1:1 Treatment Time:  41       Treatment Area: Achilles tendinitis, right leg [M76.61]    SUBJECTIVE  Pain Level (0-10 scale): 1/10  Any medication changes, allergies to medications, adverse drug reactions, diagnosis change, or new procedure performed?: [x] No    [] Yes (see summary sheet for update)  Subjective functional status/changes:   [] No changes reported  The patient states that her achilles did pretty well while on vacation and she was able to do quite a bit of walking. She stayed on the 4th floor and did a lot of stairs. OBJECTIVE  19 min Therapeutic Exercise:  [] See flow sheet :   Rationale: increase ROM and increase strength to improve the patients ability to improve ADL ease. 10 min Neuromuscular Re-education:  []  See flow sheet :   Rationale: improve coordination, improve balance and increase proprioception  to improve the patients ability to improve ADL ease. 12 min Manual Therapy:  Graston Technique Treatment Intervention:     Patient positioning: prone     Treatment location: achilles, soleus, gastroc right, achilles insertion     Graston Technique Instruments utilized: GT 6, GT3, GT5     Explained effects and benefits of Graston Technique, including potential for post treatment soreness and bruising.  Patient was provided the opportunity to ask any questions and verbalized understanding.  Patient wished to proceed with treatment. The manual therapy interventions were performed at a separate and distinct time from the therapeutic activities interventions.   Rationale: decrease pain, increase ROM, increase tissue extensibility and decrease trigger points to improve ADL ease. With   [] TE   [] TA   [] neuro   [] other: Patient Education: [x] Review HEP    [] Progressed/Changed HEP based on:   [] positioning   [] body mechanics   [] transfers   [] heat/ice application    [] other:      Other Objective/Functional Measures:   Progressed heel raise to a 2 cm single leg HR. FOTO: 43    Pain Level (0-10 scale) post treatment: 2/10    ASSESSMENT/Changes in Function: The patient is progressing with regards to her strength of her gastroc, decreasing pain, and improved FOTO score indicating improved ease of ambulation and quality of life. She will benefit from additional 2 weeks of PT in order to progress strength. Patient will continue to benefit from skilled PT services to modify and progress therapeutic interventions, address functional mobility deficits, address ROM deficits, address strength deficits, analyze and address soft tissue restrictions, analyze and cue movement patterns, analyze and modify body mechanics/ergonomics, assess and modify postural abnormalities and instruct in home and community integration to attain remaining goals.      []  See Plan of Care  []  See progress note/recertification  []  See Discharge Summary         Progress towards goals / Updated goals:  1. Patient will demonstrate at least 15 seconds SLS balance on right LE on uneven surface without increase in pain to improve ease with stair negotiation and walking on uneven surfaces such as the beach.               PN Progressed to 15\" but on even ground              Current: 9\"      2. Patient will increase right ankle strength grossly to 5-/5 to improve ease with overall mobility.                Eval: 4+/5 DF with increased pain, 4/5 PF with minimal pain, 4-/5 inversion and eversion without pain   Current: Met with all but plantar flexion 3-/5 MMT    3. Patient will increase FOTO survey score to 52 to improve ease with daily tasks and promote return to PLOF.               PN: 35   Current: progressed to 43  3.  The patient will demonstrate right heel eccentric heel raise 3 cm heel excursion to improve ease of ambulation.              PN:  0 cm              Current: Progressed to 2 cm heel raise single leg10/15/2021    PLAN  []  Upgrade activities as tolerated     [x]  Continue plan of care  []  Update interventions per flow sheet       []  Discharge due to:_  []  Other:_      Ladean Call, PT 10/15/2021  2:41 PM    Future Appointments   Date Time Provider Lian Bustamante   10/19/2021 11:15 AM [facility], PT [facility]   10/22/2021 10:00 [facility]   10/26/2021 11:15 AM [facility], PT [facility]   6/21/2022  8:15 AM [facility] VISIT [facility]   6/28/2022  8:00 AM Aixa Falcon MD [facility]

## 2021-10-19 ENCOUNTER — HOSPITAL ENCOUNTER (OUTPATIENT)
Dept: PHYSICAL THERAPY | Age: 61
Discharge: HOME | End: 2021-10-19
Payer: COMMERCIAL

## 2021-10-19 PROCEDURE — 97112 NEUROMUSCULAR REEDUCATION: CPT

## 2021-10-19 PROCEDURE — 97140 MANUAL THERAPY 1/> REGIONS: CPT

## 2021-10-19 PROCEDURE — 97110 THERAPEUTIC EXERCISES: CPT

## 2021-10-19 NOTE — PROGRESS NOTES
PT DAILY TREATMENT NOTE     Patient Name: Kaur Foster  Date:10/19/2021  : 1960  [x]  Patient  Verified  Payor: Bianca Overall / Plan: [address] [place] [address] / Product Type: PPO /    In time:11:15  Out time: 12:04  Total Treatment Time (min): 49  Visit #: 1 of 4    Medicare/BCBS Only   Total Timed Codes (min):  39 1:1 Treatment Time:  39       Treatment Area: Achilles tendinitis, right leg [M76.61]    SUBJECTIVE  Pain Level (0-10 scale): 2/10  Any medication changes, allergies to medications, adverse drug reactions, diagnosis change, or new procedure performed?: [x] No    [] Yes (see summary sheet for update)  Subjective functional status/changes:   [] No changes reported  The patient reports that the outer portion of her achilles is burning, and her right sciatica is burning. OBJECTIVE  17 min Therapeutic Exercise:  [x] See flow sheet :   Rationale: increase ROM and increase strength to improve the patients ability to improve ADL ease. 10 min Neuromuscular Re-education:  [x]  See flow sheet :   Rationale: increase ROM and increase strength  to improve the patients ability to improve ADL ease. 12 min Manual Therapy:    Graston Technique Treatment Intervention:     Patient positioning: prone     Treatment location: achilles, soleus, gastroc right, achilles insertion     Graston Technique Instruments utilized: GT 6, GT3, GT5     Explained effects and benefits of Graston Technique, including potential for post treatment soreness and bruising.  Patient was provided the opportunity to ask any questions and verbalized understanding.  Patient wished to proceed with treatment. The manual therapy interventions were performed at a separate and distinct time from the therapeutic activities interventions. Rationale: decrease pain, increase ROM and increase tissue extensibility to improve ADL ease.          Modality rationale: decrease edema, decrease inflammation and decrease pain to improve the patients ability to improve ADL ease. Min Type Additional Details   10 [x]  Vasopneumatic Device  Pre-treatment girth:  Post-treatment girth:  Measured at (location):  Pressure:       [x] lo [] med [] hi   Temperature: [x] lo [] med [] hi   [] Skin assessment post-treatment:  []intact []redness- no adverse reaction    []redness - adverse reaction:     With   [] TE   [] TA   [] neuro   [] other: Patient Education: [x] Review HEP    [] Progressed/Changed HEP based on:   [] positioning   [] body mechanics   [] transfers   [] heat/ice application    [] other:      Other Objective/Functional Measures:    Pain Level (0-10 scale) post treatment: 0/10    ASSESSMENT/Changes in Function: The patient had more pain through her lateral achilles today. Opted to hold second set of eccentric exercises today in order to reduce pain. Utilize GR post session ti assist with reducing edema. Patient will continue to benefit from skilled PT services to modify and progress therapeutic interventions, address functional mobility deficits, address ROM deficits, address strength deficits, analyze and address soft tissue restrictions, analyze and cue movement patterns, analyze and modify body mechanics/ergonomics, assess and modify postural abnormalities and instruct in home and community integration to attain remaining goals. []  See Plan of Care  []  See progress note/recertification  []  See Discharge Summary         Progress towards goals / Updated goals:  Updated Goals: to be achieved in 2 weeks:              1. The patient will demonstrate right heel eccentric heel raise 3 cm heel excursion to improve ease of ambulation.              PN:  Progressed to 2 cm heel raise single leg10/15/2021              2. Patient will increase FOTO survey score to 52 to improve ease with daily tasks and promote return to PLOF.                PN:  progressed to 43               3. The patient will perform SLS on airex 15\" without LOB to maximize stability during ambulation.     PLAN  []  Upgrade activities as tolerated     [x]  Continue plan of care  []  Update interventions per flow sheet       []  Discharge due to:_  []  Other:_      Saul Mae, PT 10/19/2021  11:46 AM    Future Appointments   Date Time Provider Lian Bustamante   10/22/2021 10:00 [facility]   10/26/2021 11:15 AM Tara Awad, PT [facility]   10/29/2021 10:45 AM [facility]   6/21/2022  8:15 AM [facility] VISIT [facility]   6/28/2022  8:00 AM Girma Falcon MD [facility]

## 2021-10-22 ENCOUNTER — HOSPITAL ENCOUNTER (OUTPATIENT)
Dept: PHYSICAL THERAPY | Age: 61
Discharge: HOME | End: 2021-10-22
Payer: COMMERCIAL

## 2021-10-22 PROCEDURE — 97112 NEUROMUSCULAR REEDUCATION: CPT

## 2021-10-22 PROCEDURE — 97140 MANUAL THERAPY 1/> REGIONS: CPT

## 2021-10-22 NOTE — PROGRESS NOTES
PT DAILY TREATMENT NOTE     Patient Name: Payton Burns  Date:10/22/2021  : 1960   [x]  Patient  Verified  Payor: Vince Urrutia / Plan: [address] [place] [address] / Product Type: PPO /    In time:10:00  Out time:10:35  Total Treatment Time (min): 35  Visit #: 2 of 4    Medicare/BCBS Only   Total Timed Codes (min):  35 1:1 Treatment Time:  35       Treatment Area: Achilles tendinitis, right leg [M76.61]    SUBJECTIVE  Pain Level (0-10 scale): 1/10  Any medication changes, allergies to medications, adverse drug reactions, diagnosis change, or new procedure performed?: [x] No    [x] Yes (see summary sheet for update) Prednisone  Subjective functional status/changes:   [] No changes reported  The patient reports she did start some prednisone due to an active     OBJECTIVE  8 min Therapeutic Exercise:  [] See flow sheet :   Rationale: increase ROM and increase strength to improve the patients ability to improve ADL ease. 15 min Neuromuscular Re-education:  [x]  See flow sheet :   Rationale: increase ROM, increase strength and improve coordination  to improve the patients ability to improve ADL ease. 12 min Manual Therapy:  Graston Technique Treatment Intervention:     Patient positioning: prone     Treatment location: achilles, soleus, gastroc right, achilles insertion     Graston Technique Instruments utilized: GT 6, GT3, GT5     Explained effects and benefits of Graston Technique, including potential for post treatment soreness and bruising.  Patient was provided the opportunity to ask any questions and verbalized understanding.  Patient wished to proceed with treatment. The manual therapy interventions were performed at a separate and distinct time from the therapeutic activities interventions. Rationale: decrease pain, increase ROM, increase tissue extensibility and decrease trigger points to improve ADL ease.        With   [] TE   [] TA   [] neuro   [] other: Patient Education: [x] Review HEP    [] Progressed/Changed HEP based on:   [] positioning   [] body mechanics   [] transfers   [] heat/ice application    [] other:      Other Objective/Functional Measures:      Pain Level (0-10 scale) post treatment: 2/10    ASSESSMENT/Changes in Function: The patient states that she does continue to have pain, but less and feels that some of this may be due to taking prednisone. She was able to progress back into 2 wets of some exercises. The patient has one additional week of treatment with plan to D/C at that time. Patient will continue to benefit from skilled PT services to modify and progress therapeutic interventions, address functional mobility deficits, address ROM deficits, address strength deficits, analyze and address soft tissue restrictions, analyze and cue movement patterns, analyze and modify body mechanics/ergonomics, assess and modify postural abnormalities and instruct in home and community integration to attain remaining goals. []  See Plan of Care  []  See progress note/recertification  []  See Discharge Summary         Progress towards goals / Updated goals:  Updated Goals: to be achieved in 2 weeks:              1. The patient will demonstrate right heel eccentric heel raise 3 cm heel excursion to improve ease of ambulation.              PN:  Progressed to 2 cm heel raise single leg10/15/2021              2. Patient will increase FOTO survey score to 52 to improve ease with daily tasks and promote return to PLOF.                PN:  progressed to 43                3. The patient will perform SLS on airex 15\" without LOB to maximize stability during ambulation.     PLAN  []  Upgrade activities as tolerated     [x]  Continue plan of care  []  Update interventions per flow sheet       []  Discharge due to:_  []  Other:_      Bárbara Husbands, PT 10/22/2021  10:22 AM    Future Appointments   Date Time Provider Lian Bustamante   10/26/2021 11:15 AM Edna Ford, SHANTE [facility]   10/29/2021 10:45 AM Armani Awad, PT [facility]   6/21/2022  8:15 AM [facility] LAB VISIT [facility]   6/28/2022  8:00 AM Fouzia Falcon MD [facility] BS AM[facility]

## 2021-10-26 ENCOUNTER — HOSPITAL ENCOUNTER (OUTPATIENT)
Dept: PHYSICAL THERAPY | Age: 61
Discharge: HOME | End: 2021-10-26
Payer: COMMERCIAL

## 2021-10-26 PROCEDURE — 97110 THERAPEUTIC EXERCISES: CPT

## 2021-10-26 PROCEDURE — 97112 NEUROMUSCULAR REEDUCATION: CPT

## 2021-10-26 NOTE — PROGRESS NOTES
PT DAILY TREATMENT NOTE     Patient Name: Israel Blanchard  Date:10/26/2021  : 1960  [x]  Patient  Verified  Payor: [employer] / Plan: [address] [place] [address] / Product Type: PPO /    In time:11:17  Out time:11:50  Total Treatment Time (min): 33  Visit #: 3 of 4    Medicare/BCBS Only   Total Timed Codes (min):  33 1:1 Treatment Time:  33       Treatment Area: Achilles tendinitis, right leg [M76.61]    SUBJECTIVE  Pain Level (0-10 scale): 1/10  Any medication changes, allergies to medications, adverse drug reactions, diagnosis change, or new procedure performed?: [x] No    [] Yes (see summary sheet for update)  Subjective functional status/changes:   [] No changes reported  The patient reports that she is doing well. She states she does have occasional pains with it when she is not moving, however. OBJECTIVE  15 min Therapeutic Exercise:  [] See flow sheet :   Rationale: increase ROM and increase strength to improve the patients ability to improve ADL ease. 10 min Neuromuscular Re-education:  [x]  See flow sheet :   Rationale: improve coordination, improve balance and increase proprioception  to improve the patients ability to improve ADL ease. 8 min Manual Therapy:  Graston Technique Treatment Intervention:     Patient positioning: prone     Treatment location: achilles, soleus, gastroc right, achilles insertion     Graston Technique Instruments utilized: GT 6, GT3, GT5     Explained effects and benefits of Graston Technique, including potential for post treatment soreness and bruising.  Patient was provided the opportunity to ask any questions and verbalized understanding.  Patient wished to proceed with treatment. The manual therapy interventions were performed at a separate and distinct time from the therapeutic activities interventions. Rationale: decrease pain, increase ROM, increase tissue extensibility and decrease trigger points to improve ADL ease.        With   [] TE [] TA   [] neuro   [] other: Patient Education: [x] Review HEP    [] Progressed/Changed HEP based on:   [] positioning   [] body mechanics   [] transfers   [] heat/ice application    [] other:      Other Objective/Functional Measures:   6 cm single leg heel raise    18\" single leg stance on airex     Pain Level (0-10 scale) post treatment: 1/10    ASSESSMENT/Changes in Function: The patient has progressed well regarding strength and stability, noting 6 cm heel raise with 18\" single leg stance on airex. She will benefit from 1 additional session prior to D/C to continue HEP with self management. Patient will continue to benefit from skilled PT services to modify and progress therapeutic interventions, address functional mobility deficits, address ROM deficits, address strength deficits, analyze and address soft tissue restrictions, analyze and cue movement patterns, analyze and modify body mechanics/ergonomics, assess and modify postural abnormalities and instruct in home and community integration to attain remaining goals. []  See Plan of Care  []  See progress note/recertification  []  See Discharge Summary         Progress towards goals / Updated goals:  Updated Goals: to be achieved in 2 weeks:               1. The patient will demonstrate right heel eccentric heel raise 3 cm heel excursion to improve ease of ambulation.              PN:  Progressed to 2 cm heel raise single leg10/15/2021   Current: 6 cm 10/26/2021              2. Patient will increase FOTO survey score to 52 to improve ease with daily tasks and promote return to PLOF.                PN:  progressed to 43   Current:               3. The patient will perform SLS on airex 15\" without LOB to maximize stability during ambulation. Met - 18\" on airex.      PLAN  []  Upgrade activities as tolerated     [x]  Continue plan of care  []  Update interventions per flow sheet       []  Discharge due to:_  []  Other:_      Oksana Mccann PT 10/26/2021  11:42 AM    Future Appointments   Date Time Provider Lian Mci   10/29/2021 10:45 AM Meliza Mckeon, PT [facility]   6/21/2022  8:15 AM [facility] LAB VISIT [facility]   6/28/2022  8:00 AM Joey Falcon MD [facility] AMB

## 2021-10-29 ENCOUNTER — HOSPITAL ENCOUNTER (OUTPATIENT)
Dept: PHYSICAL THERAPY | Age: 61
Discharge: HOME | End: 2021-10-29
Payer: COMMERCIAL

## 2021-10-29 PROCEDURE — 97140 MANUAL THERAPY 1/> REGIONS: CPT

## 2021-10-29 PROCEDURE — 97112 NEUROMUSCULAR REEDUCATION: CPT

## 2021-10-29 PROCEDURE — 97110 THERAPEUTIC EXERCISES: CPT

## 2021-10-29 NOTE — PROGRESS NOTES
PT DAILY TREATMENT NOTE     Patient Name: Dickson Pendleton  Date:10/29/2021  : 1960  [x]  Patient  Verified  Payor: Santino Liang / Plan: [address] [place] [address] / Product Type: PPO /    In time:10:45  Out time:11:24  Total Treatment Time (min): 39  Visit #: 4 of 4    Medicare/BCBS Only   Total Timed Codes (min):  39 1:1 Treatment Time:  39       Treatment Area: Achilles tendinitis, right leg [M76.61]    SUBJECTIVE  Pain Level (0-10 scale): 1/10  Any medication changes, allergies to medications, adverse drug reactions, diagnosis change, or new procedure performed?: [x] No    [] Yes (see summary sheet for update)  Subjective functional status/changes:   [] No changes reported  The patient states she is feeling pretty good, she has some slight pain through the back outside aspect of her achilles. OBJECTIVE  12 min Therapeutic Exercise:  [] See flow sheet :   Rationale: increase ROM and increase strength to improve the patients ability to improve ADL ease. 15 min Neuromuscular Re-education:  [x]  See flow sheet :   Rationale: improve coordination, improve balance and increase proprioception  to improve the patients ability to improve ADL ease. 12 min Manual Therapy:  Graston Technique Treatment Intervention:     Patient positioning: prone     Treatment location: achilles, soleus, gastroc right, achilles insertion     Graston Technique Instruments utilized: GT 6, GT3, GT5     Explained effects and benefits of Graston Technique, including potential for post treatment soreness and bruising.  Patient was provided the opportunity to ask any questions and verbalized understanding.  Patient wished to proceed with treatment. The manual therapy interventions were performed at a separate and distinct time from the therapeutic activities interventions. Rationale: decrease pain, increase ROM and increase tissue extensibility to improve ADL ease.          With   [] TE   [] TA   [] neuro   [] other: Other Objective/Functional Measures: FOTO: 63    Pain Level (0-10 scale) post treatment: 1/10    ASSESSMENT/Changes in Function: The patient has made significant progress regarding her ease of ambulation, stair negotiation, and overall, mobility. She has met all goals and has demonstrated independence with her HEP. She has been discharged to continue HEP and left the clinic thanking PT and has all questions answered. []  See Plan of Care  []  See progress note/recertification  []  See Discharge Summary         Progress towards goals / Updated goals:  Updated Goals: to be achieved in 2 weeks:               1. The patient will demonstrate right heel eccentric heel raise 3 cm heel excursion to improve ease of ambulation.              PN:  Progressed to 2 cm heel raise single leg10/15/2021              Current: 6 cm 10/26/2021              2. Patient will increase FOTO survey score to 52 to improve ease with daily tasks and promote return to PLOF.                PN:  progressed to 43              Current: 63 MET              3. The patient will perform SLS on airex 15\" without LOB to maximize stability during ambulation. Met - 18\" on airex.      PLAN  []  Upgrade activities as tolerated     []  Continue plan of care  []  Update interventions per flow sheet       [x]  Discharge due to:_ meeting all goals  []  Other:_      Rosa Maria Donaldson, PT 10/29/2021  11:10 AM    Future Appointments   Date Time Provider Lian Bustamante   6/21/2022  8:15 AM [facility] LAB VISIT [facility]   6/28/2022  8:00 AM Manuela Falcon MD [facility] BS AMB

## 2021-10-29 NOTE — PROGRESS NOTES
[facility]  [address]  [place], [address]  [phone] fax    Physical Therapy Discharge Summary  Patient name: Duyen Hodges Start of Care: 2021   Referral source: Shawnee Maradiaga DPM : 1960                Medical Diagnosis: Achilles tendinitis, right leg [M76.61]  Payor: [employer] / Plan: [address] [place] [address] / Product Type: PPO /  Onset Date: 2020                Treatment Diagnosis: right ankle pain    Prior Hospitalization: see medical history Provider#: [id number]   Medications: Verified on Patient summary List    Comorbidities: Arthritis, High Blood Pressure, Other disorders, Prior Surgery, Sleep dysfunction   Prior Level of Function: no limitations, walking ~2 miles \"a couple times a week\"   Visits from Start of Care: 17  Missed Visits: 0   Reporting Period : 10/15/2021 to 10/29/2021      Summary of Care:  Updated Goals: to be achieved in 2 weeks:               1. The patient will demonstrate right heel eccentric heel raise 3 cm heel excursion to improve ease of ambulation.              PN:  Progressed to 2 cm heel raise single leg10/15/2021              Current: 6 cm 10/26/2021              2. Patient will increase FOTO survey score to 52 to improve ease with daily tasks and promote return to PLOF.                PN:  progressed to 43              Current: 63 MET              3. The patient will perform SLS on airex 15\" without LOB to maximize stability during ambulation.              Met - 18\" on airex. ASSESSMENT/RECOMMENDATIONS: The patient has made significant progress regarding her ease of ambulation, stair negotiation, and overall, mobility. She has met all goals and has demonstrated independence with her HEP. She has been discharged to continue HEP and left the clinic thanking PT and has all questions answered.      [x]Discontinue therapy: [x]Patient has reached or is progressing toward set goals      []Patient is non-compliant or has abdicated      []Due to lack of appreciable progress towards set [address], PT 10/29/2021 11:38 AM

## 2021-12-01 RX ORDER — BENAZEPRIL HYDROCHLORIDE 10 MG/1
TABLET ORAL
Qty: 90 TABLET | Refills: 3 | Status: SHIPPED | OUTPATIENT
Start: 2021-12-01

## 2021-12-07 DIAGNOSIS — K21.9 GASTROESOPHAGEAL REFLUX DISEASE WITHOUT ESOPHAGITIS: ICD-10-CM

## 2021-12-07 NOTE — TELEPHONE ENCOUNTER
PCP: Paige To MD    Last appt: 6/22/2020  Future Appointments   Date Time Provider Lian Bustamante   6/21/2022  8:15 AM [facility] LAB VISIT [facility] BS AMB   6/28/2022  8:00 AM Malik Falcon MD [facility] BS AMB       Requested Prescriptions     Pending Prescriptions Disp Refills    pantoprazole (PROTONIX) 40 mg tablet 90 Tablet 3     Sig: Take 1 Tablet by mouth daily.

## 2021-12-08 RX ORDER — PANTOPRAZOLE SODIUM 40 MG/1
40 TABLET, DELAYED RELEASE ORAL DAILY
Qty: 90 TABLET | Refills: 3 | Status: SHIPPED | OUTPATIENT
Start: 2021-12-08

## 2021-12-15 DIAGNOSIS — F51.04 CHRONIC INSOMNIA: ICD-10-CM

## 2021-12-15 RX ORDER — ZOLPIDEM TARTRATE 5 MG/1
TABLET ORAL
Qty: 90 TABLET | Refills: 1 | Status: SHIPPED | OUTPATIENT
Start: 2021-12-15 | End: 2022-07-10

## 2022-01-07 RX ORDER — ESCITALOPRAM OXALATE 20 MG/1
TABLET ORAL
Qty: 90 TABLET | Refills: 3 | Status: SHIPPED | OUTPATIENT
Start: 2022-01-07

## 2022-01-18 ENCOUNTER — PATIENT MESSAGE (OUTPATIENT)
Dept: INTERNAL MEDICINE CLINIC | Age: 62
End: 2022-01-18

## 2022-01-20 ENCOUNTER — VIRTUAL VISIT (OUTPATIENT)
Dept: INTERNAL MEDICINE CLINIC | Age: 62
End: 2022-01-20
Payer: COMMERCIAL

## 2022-01-20 DIAGNOSIS — H66.90 ACUTE OTITIS MEDIA, UNSPECIFIED OTITIS MEDIA TYPE: Primary | ICD-10-CM

## 2022-01-20 PROCEDURE — 99213 OFFICE O/P EST LOW 20 MIN: CPT | Performed by: INTERNAL MEDICINE

## 2022-01-20 RX ORDER — CEFUROXIME AXETIL 500 MG/1
500 TABLET ORAL 2 TIMES DAILY
Qty: 20 TABLET | Refills: 0 | Status: SHIPPED | OUTPATIENT
Start: 2022-01-20 | End: 2022-06-28 | Stop reason: ALTCHOICE

## 2022-01-20 RX ORDER — ABATACEPT 125 MG/ML
INJECTION, SOLUTION SUBCUTANEOUS
COMMUNITY
Start: 2022-01-14 | End: 2022-06-28

## 2022-01-20 NOTE — PROGRESS NOTES
Adrienne Morrell is a 60 y.o. female who was seen by synchronous (real-time) audio-video technology on 1/20/2022 for URI (X 1.5 weeks ago had sinus/allergy symptoms, no cough no runny nose, ear pressure (bilateral ears))        Assessment & Plan:   Diagnoses and all orders for this visit:    1. Acute otitis media, unspecified otitis media type  -     cefUROXime (CEFTIN) 500 mg tablet; Take 1 Tablet by mouth two (2) times a day. I spent at least 20 minutes on this visit with this established patient. [id number]  Subjective:       Objective:     Patient-Reported Vitals 1/20/2022   Patient-Reported Weight 155lbs      General: alert, cooperative, no distress   Mental  status: normal mood, behavior, speech, dress, motor activity, and thought processes, able to follow commands   HENT: NCAT   Neck: no visualized mass   Resp: no respiratory distress   Neuro: no gross deficits   Skin: no discoloration or lesions of concern on visible areas   Psychiatric: normal affect, consistent with stated mood, no evidence of hallucinations     Additional exam findings: We discussed the expected course, resolution and complications of the diagnosis(es) in detail. Medication risks, benefits, costs, interactions, and alternatives were discussed as indicated. I advised her to contact the office if her condition worsens, changes or fails to improve as anticipated. She expressed understanding with the diagnosis(es) and plan. Adrienne Morrell, was evaluated through a synchronous (real-time) audio-video encounter. The patient (or guardian if applicable) is aware that this is a billable service, which includes applicable co-pays. Verbal consent to proceed has been obtained. The visit was conducted pursuant to the emergency declaration under the [facility], [address] [place] [address] authority and the [employer] General Act.   Patient identification was verified, and a caregiver was present when appropriate. The patient was located at home in a state where the provider was licensed to provide care. Lupe Ortiz MD    For about 2 weeks now, she has been having either allergy or URI symptoms. Describes a lot of sinus congestion, mostly clear drainage. Been using Astelin, Zyrtec, minimal relief. Over the last 4 days, she has noted bilateral right greater than left ear fullness and discomfort. Her lymph nodes on the right side are inflamed as well. No fevers, minimal cough, sore throat. No GI or  complaints, no known exposures, she is vaccinated and boosted. She is on Orencia and Plaquenil.     Past Medical History:   Diagnosis Date    Allergic rhinitis     Dr Marie Garibay 4/14    KIMBERLEY-7 was 19/21    Chronic insomnia 06/17/2019    tried benadryl, trazodone; intol belsomra, doxepin; using ambien    Depression     has tried paxil, zoloft, wellbutrin, cymbalta, trazodone    Dyslipidemia     calculated 10 year risk score was 2.5% (4/15)    Endometriosis     Fibromyalgia     Dr. Nico Logan in past    GERD (gastroesophageal reflux disease)     Hypertension     Multiple thyroid nodules 07/2009    neg FNA Dr. Keke Ingram now Dr Symone Castillo    Nephrolithiasis 1980s    Osteopenia     DEXA t score -0.8 spine, -1.6 hip (10/07);  -0.4 spine, -1.1 hip w FRAX 5.2/0.5 (10/12); -1.1 spine, -1.0 hip (4/15); -0.3 spine, -1.5 hip (9/20)  Dr Nico Lgoan neg w/u secondary causes    Overweight (BMI 25.0-29.9)     peak weight 149 lbs, bmi 29.1 rom 9/16; failed qsymia and belviq; 24h U patria elev but LDST neg 9/16    Polymyalgia rheumatica ([facility] [address]     Dr Nico Logan    PUD (peptic ulcer disease) 2018    Dr Jeremy Brown on EGD; h pylori neg    Rheumatoid arthritis of multiple sites with negative rheumatoid factor ([facility] [address] 3/10/2019    Dr Nico Logan 2018; ccp+    Rosacea     YOSELIN (stress urinary incontinence, female) 2017    Dr Maria Del Rosario Oliver 2022    Trochanteric bursitis     Vertical diplopia 1/14    Vitamin D deficiency      Current Outpatient Medications   Medication Sig    Orencia ClickJect 458 mg/mL atIn     cefUROXime (CEFTIN) 500 mg tablet Take 1 Tablet by mouth two (2) times a day.  escitalopram oxalate (LEXAPRO) 20 mg tablet TAKE 1 TABLET DAILY    zolpidem (AMBIEN) 5 mg tablet TAKE 1 TABLET AT BEDTIME ASNEEDED    pantoprazole (PROTONIX) 40 mg tablet Take 1 Tablet by mouth daily.  benazepriL (LOTENSIN) 10 mg tablet TAKE 1 TABLET DAILY (SOLCO MFR)    azelastine (ASTELIN) 137 mcg (0.1 %) nasal spray 2 Sprays by Both Nostrils route two (2) times a day. Use in each nostril as directed    ergocalciferol (ERGOCALCIFEROL) 1,250 mcg (50,000 unit) capsule TAKE 1 CAPSULE EVERY 14    DAYS    hydrOXYchloroQUINE (Plaquenil) 200 mg tablet Take 200 mg by mouth two (2) times a day.  LORazepam (ATIVAN) 0.5 mg tablet Take 1 Tab by mouth every twelve (12) hours as needed for Anxiety. Max Daily Amount: 1 mg.  estradiol (ESTRACE) 0.5 mg tablet Take  by mouth daily.  multivitamin (ONE A DAY) tablet Take 1 Tab by mouth daily. No current facility-administered medications for this visit. Allergies   Allergen Reactions    Augmentin [Amoxicillin-Pot Clavulanate] Rash    Avelox [Moxifloxacin] Rash    Belsomra [Suvorexant] Other (comments)     anxiety    Cymbalta [Duloxetine] Unknown (comments)     Pt unsure      Elavil Unknown (comments)     Pt unsure      Lexapro [Escitalopram] Other (comments)     Wt gain    Lyrica [Pregabalin] Other (comments)     Wt gain and double vision    Motrin [Ibuprofen] Rash    Nsaids (Non-Steroidal Anti-Inflammatory Drug) Other (comments)     H/o PUD    Paxil [Paroxetine Hcl] Other (comments)     Weight gain    Pcn [Penicillins] Rash    Trazodone Unknown (comments)     Pt unsure       Assessment and plan:  1. Rule out otitis media.   Empiric treatment with Ceftin was given, take decongestants with her antihistamine, call with an update          Above conditions discussed at length and patient vocalized understanding.   All questions answered to patient satisfaction

## 2022-01-20 NOTE — PROGRESS NOTES
Maegan Santana presents with   Chief Complaint   Patient presents with    URI     X 1.5 weeks ago had sinus/allergy symptoms, no cough no runny nose, ear pressure (bilateral ears)            1. \"Have you been to the ER, urgent care clinic since your last visit? Hospitalized since your last visit? \" NO    2. \"Have you seen or consulted any other health care providers outside of the [address] [place] [address] since your last visit? \" NO    3. For patients aged 39-70: Has the patient had a colonoscopy? Yes - no Care Gap present     If the patient is female:    4. For patients aged 41-77: Has the patient had a mammogram within the past 2 years? Yes - no Care Gap present    5. For patients aged 21-65: Has the patient had a pap smear?  No Not a candidate

## 2022-01-20 NOTE — TELEPHONE ENCOUNTER
----- Message from Jesi Cuellar MD sent at 1/20/2022 11:47 AM EST -----  Pls call  Camila Mcmahon may interact with lexapro and plaquenil  Will send ceftin instead

## 2022-03-10 NOTE — PROGRESS NOTES
[facility]  [address]  [place], [address]  [phone] fax    Physical Therapy Progress Note  Patient name: Duyen Carmona Start of Care: 2021   Referral source: Wayne Maradiaga DPM : 1960                Medical Diagnosis: Achilles tendinitis, right leg [M76.61]  Payor: [employer] / Plan: [address] [place] [address] / Product Type: PPO /  Onset Date: 2020                Treatment Diagnosis: right ankle pain    Prior Hospitalization: see medical history Provider#: [id number]   Medications: Verified on Patient summary List    Comorbidities: Arthritis, High Blood Pressure, Other disorders, Prior Surgery, Sleep dysfunction   Prior Level of Function: no limitations, walking ~2 miles [place] couple times a week\"                  Visits from Start of Care: 13    Missed Visits: 0    Key Functional Changes:   1. Patient will demonstrate at least 15 seconds SLS balance on right LE on uneven surface without increase in pain to improve ease with stair negotiation and walking on uneven surfaces such as the beach.               PN Progressed to 15\" but on even ground              Current: 9\"      2. Patient will increase right ankle strength grossly to 5-/5 to improve ease with overall mobility.                Eval: 4+/5 DF with increased pain, 4/5 PF with minimal pain, 4-/5 inversion and eversion without pain              Current: Met with all but plantar flexion 3-/5 MMT    3. Patient will increase FOTO survey score to 52 to improve ease with daily tasks and promote return to PLOF.                PN: 35              Current: progressed to 43  3. The patient will demonstrate right heel eccentric heel raise 3 cm heel excursion to improve ease of ambulation.              PN:  0 cm              Current: Progressed to 2 cm heel raise single leg10/15/2021    Updated Goals: to be achieved in 2 weeks:   1.  The patient will demonstrate right heel eccentric heel raise 3 cm heel excursion to improve ease of ambulation.              PN:  Progressed to 2 cm heel raise single leg10/15/2021   2. Patient will increase FOTO survey score to 52 to improve ease with daily tasks and promote return to PLOF.                PN:  progressed to 43    3. The patient will perform SLS on airex 15\" without LOB to maximize stability during ambulation. ASSESSMENT/RECOMMENDATIONS:  [x]Continue therapy per initial plan/protocol at a frequency of  2 x per week for 2 weeks  []Continue therapy with the following recommended changes:_____________________      _____________________________________________________________________  []Discontinue therapy progressing towards or have reached established goals  []Discontinue therapy due to lack of appreciable progress towards goals  []Discontinue therapy due to lack of attendance or compliance  []Await Physician's recommendations/decisions regarding therapy  []Other:________________________________________________________________    Thank you for this referral.    Damian Lakhani, PT 10/15/2021 2:58 PM  NOTE TO PHYSICIAN:  [address]   FAX TO [facility]: [phone]  If you are unable to process this request in 24 hours please contact our office: [phone]    ? I have read the above report and request that my patient continue as recommended. ? I have read the above report and request that my patient continue therapy with the following changes/special instructions:__________________________________________________________  ? I have read the above report and request that my patient be discharged from therapy.     Physicians signature: ______________________________Date: ______Time:______     Lacey Hoover DPM no

## 2022-03-18 PROBLEM — M06.09 RHEUMATOID ARTHRITIS OF MULTIPLE SITES WITH NEGATIVE RHEUMATOID FACTOR (HCC): Status: ACTIVE | Noted: 2019-03-10

## 2022-03-19 PROBLEM — F51.04 CHRONIC INSOMNIA: Status: ACTIVE | Noted: 2019-06-17

## 2022-03-19 PROBLEM — M79.7 FIBROMYALGIA: Status: ACTIVE | Noted: 2019-03-10

## 2022-03-19 PROBLEM — N39.3 SUI (STRESS URINARY INCONTINENCE, FEMALE): Status: ACTIVE | Noted: 2017-11-13

## 2022-04-25 ENCOUNTER — PATIENT MESSAGE (OUTPATIENT)
Dept: INTERNAL MEDICINE CLINIC | Age: 62
End: 2022-04-25

## 2022-04-25 RX ORDER — NEOMYCIN SULFATE, POLYMYXIN B SULFATE AND DEXAMETHASONE 3.5; 10000; 1 MG/ML; [USP'U]/ML; MG/ML
1 SUSPENSION/ DROPS OPHTHALMIC 2 TIMES DAILY
Qty: 1 EACH | Refills: 0 | Status: SHIPPED | OUTPATIENT
Start: 2022-04-25 | End: 2022-06-28 | Stop reason: ALTCHOICE

## 2022-04-25 NOTE — TELEPHONE ENCOUNTER
From: Neetu Villalta  To: Carina Reid MD  Sent: 4/25/2022 11:07 AM EDT  Subject: Possible contact/allergic dermatitis    Redness below both eyes since Saturday with burning. Have used Maxitrol (generic) ophthalmic ointment in the past for this. Can you send RX to [facility] at [facility]?

## 2022-04-26 NOTE — TELEPHONE ENCOUNTER
Regarding: Possible contact/allergic dermatitis  Please change med to ophthalmic ointment- at [phone]

## 2022-04-27 NOTE — TELEPHONE ENCOUNTER
pls call pharmacy  Only preparation available on [employer] was eye drop - can they get the cream filled out - apply as per  recommendations

## 2022-04-28 ENCOUNTER — TELEPHONE (OUTPATIENT)
Dept: INTERNAL MEDICINE CLINIC | Age: 62
End: 2022-04-28

## 2022-04-28 NOTE — TELEPHONE ENCOUNTER
Spoke with Pharmacist and had RX changed. Left voicemail on patients phone letter her know that it was successfully changed and they are filling it now. Advised her to call back if she has any questions.

## 2022-04-29 ENCOUNTER — PATIENT MESSAGE (OUTPATIENT)
Dept: INTERNAL MEDICINE CLINIC | Age: 62
End: 2022-04-29

## 2022-04-29 DIAGNOSIS — E66.3 OVERWEIGHT (BMI 25.0-29.9): Primary | ICD-10-CM

## 2022-05-02 NOTE — TELEPHONE ENCOUNTER
Only other thing would be contrave but might interfere with the lexapro  The injectables for wt loss are generally not covered due to cost (ozempic type med - >1K/mo)

## 2022-05-02 NOTE — TELEPHONE ENCOUNTER
From: Ambika Sessions  To: Carmen Freitas MD  Sent: 4/29/2022 4:25 PM EDT  Subject: Diet meds    Dr Ara Jara   My weight is now up to 155 and I have not been able to lose weight. We tried Qysema ? a few years ago - didnt work. But wondering if we can try something else.   Damaris Sánchez

## 2022-05-03 ENCOUNTER — TRANSCRIBE ORDER (OUTPATIENT)
Dept: SCHEDULING | Age: 62
End: 2022-05-03

## 2022-05-03 DIAGNOSIS — Z12.31 VISIT FOR SCREENING MAMMOGRAM: Primary | ICD-10-CM

## 2022-05-03 NOTE — TELEPHONE ENCOUNTER
would try 1 month first  This need to be titrated  Once we know she tolerates, we can send in the mail away  Sent to local

## 2022-05-03 NOTE — TELEPHONE ENCOUNTER
Per pharmacy, Contrave is not covered. Plan prefers Rehan Sherman. Please advise and pend new Rx if appropriate.

## 2022-05-04 ENCOUNTER — PATIENT MESSAGE (OUTPATIENT)
Dept: INTERNAL MEDICINE CLINIC | Age: 62
End: 2022-05-04

## 2022-05-04 DIAGNOSIS — E66.9 OBESITY (BMI 30.0-34.9): Primary | ICD-10-CM

## 2022-05-04 NOTE — TELEPHONE ENCOUNTER
pls call    i'm surprised they are covering the injectables  BUT, saxenda or wegovy might be contraindicated  She has seen dr Flor Villalta in past for thyroid nodules  pls have her call his office and ask if she can take wegovy DISPLAY PLAN FREE TEXT

## 2022-05-04 NOTE — TELEPHONE ENCOUNTER
[facility] on [phone], patient returned call, she was given information, verbalized understanding.

## 2022-05-05 NOTE — TELEPHONE ENCOUNTER
----- Message from Alfreda Jarvis sent at 5/5/2022  4:07 PM EDT -----  Regarding: Weight Loss Medications  Dr Joan Nascimento says Terry Brochure is ok. Hopefully its available- I read that there is a problem with supply.

## 2022-05-09 NOTE — TELEPHONE ENCOUNTER
wegovy 0.25 weekly  Inc to 0.5 in a month  Then keep inc by 0.25 every month until total 2.4mg    1 mo sent - call for refill at higher dose in a month

## 2022-06-08 NOTE — TELEPHONE ENCOUNTER
We can try saxenda    0.6 mg daily injection - week 1  Inc to 1.2 daily after 1 week  Then 1.8 daily after 1 week  Then 2.4 daily after 1 week  Then max dose 3.0 daily thereatter    This may still not be covered /available or very expensive; [employer] product website to get coupons or try goodrx    There are videos online that she can access on how to manage/admin or the nurse can show her

## 2022-06-10 NOTE — TELEPHONE ENCOUNTER
New Rx for Novofine pen needles pended to accompany the Saxenda. Please sign if appropriate. Requested Prescriptions     Pending Prescriptions Disp Refills    Insulin Needles, Disposable, (Novofine 32) 32 gauge x 1/4\" ndle 100 Pen Needle 3     Sig: To use with Saxenda injections daily     Signed Prescriptions Disp Refills    liraglutide, weight loss, (SAXENDA) 3 mg/0.5 mL (18 mg/3 mL) pen 1 Each 5     Si.6 mg by SubCUTAneous route daily.  Increase by 0.6mg every 7 days until max dose of 3mg daily is reached     Authorizing Provider: Clifton Ellison in place:    Recommendation Provided To:    Intervention Detail: New Rx: 1, reason: Patient Preference   Gap Closed?:    Intervention Accepted By:   Yoshi Haji Time Spent (min): 5

## 2022-06-13 ENCOUNTER — HOSPITAL ENCOUNTER (OUTPATIENT)
Dept: MAMMOGRAPHY | Age: 62
Discharge: HOME | End: 2022-06-13
Attending: OBSTETRICS & GYNECOLOGY
Payer: COMMERCIAL

## 2022-06-13 DIAGNOSIS — Z12.31 VISIT FOR SCREENING MAMMOGRAM: ICD-10-CM

## 2022-06-13 PROCEDURE — 77063 BREAST TOMOSYNTHESIS BI: CPT

## 2022-06-14 RX ORDER — PEN NEEDLE, DIABETIC 31 GX3/16"
NEEDLE, DISPOSABLE MISCELLANEOUS
OUTPATIENT
Start: 2022-06-14

## 2022-06-14 RX ORDER — BLOOD SUGAR DIAGNOSTIC
STRIP MISCELLANEOUS
Qty: 100 PEN NEEDLE | Refills: 3 | Status: SHIPPED | OUTPATIENT
Start: 2022-06-14

## 2022-06-14 NOTE — PROGRESS NOTES
60 y.o. WHITE/NON- female who presents for evaluation. She wanted a trial of wt loss med, wegovy not covered so she tried saxenda but that gave her diarrhea so stopped using    Otherwise, she seems to be doing well. No cardiovascular complaints. Bp controlled when she checks. She is not able to do aerobic exercise due to achilles tendonitis that's recurrent.   She does do some yoga and mild toning    No sx referable to the thyroid, she was released by Dr Hays Grate continues to do well for her    Dr Maxime Jung changed her to enbrel as neither humira nor orencia helped    IF 6/22    Past Medical History:   Diagnosis Date    Allergic rhinitis     Dr Zoey Schuster Anxiety 4/14    KIMBERLEY-7 was 19/21    Chronic insomnia 06/17/2019    tried benadryl, trazodone; intol belsomra, doxepin; using ambien    Depression     has tried paxil, zoloft, wellbutrin, cymbalta, trazodone    Dyslipidemia     calculated 10 year risk score was 2.5% (4/15)    Endometriosis     Fibromyalgia     Dr. Maxime Jung in past    GERD (gastroesophageal reflux disease)     Hypertension     Multiple thyroid nodules 07/2009    neg FNA Dr. Nika Sofia now Dr Tamir Earl    Nephrolithiasis 1980s    Osteopenia     DEXA t score -0.8 spine, -1.6 hip (10/07);  -0.4 spine, -1.1 hip w FRAX 5.2/0.5 (10/12); -1.1 spine, -1.0 hip (4/15); -0.3 spine, -1.5 hip (9/20)  Dr Maxime Jung neg w/u secondary causes    Overweight (BMI 25.0-29.9)     peak weight 149 lbs, bmi 29.1 rom 9/16; failed qsymia and belviq; 24h U patria elev but LDST neg 9/16; intol glp; IF 6/22    PMR (polymyalgia rheumatica) (HCC)     Dr Maxime Jung    PUD (peptic ulcer disease) 2018    Dr Rochelle Modi on EGD; h pylori neg    RA (rheumatoid arthritis) ([facility] [address] 03/10/2019    Dr Maxime Jung 2018; ccp+; failed Scott Longs; now enbrel    Rosacea     YOSELIN (stress urinary incontinence, female) 2017    Dr Ronit Mason 2022    Trochanteric bursitis     Vertical diplopia 1/14    Vitamin D deficiency      Past Surgical History:   Procedure Laterality Date    COLONOSCOPY N/A 6/1/2016    Dr Kina Zazueta 2005 neg; Dr Lul Rmaos 2016 neg    EGD  2005    negative Dr. Kina Zazueta, biopsy neg for sprue also    HX APPENDECTOMY      HX CHOLECYSTECTOMY  9/15    Dr Corby Zazueta 2005 neg; Dr Lul Ramos 6/1/16 neg   Argenis Hence      Dr. Vanessa Chew 2003    HX HEENT  2/14    MRI negative    HX ORTHOPAEDIC  2017    Dr Jaime Donald; avulsion fx LEFT ankle    HX SEPTOPLASTY      HX JENNY AND BSO      NEUROLOGICAL PROCEDURE UNLISTED  6/14    MRA neck neg, MRA brain negative    NEUROLOGICAL PROCEDURE UNLISTED  6/14    MRI brain neg outside scattered minimal wm change     Social History     Socioeconomic History    Marital status:      Spouse name: Not on file    Number of children: 1    Years of education: Not on file    Highest education level: Not on file   Occupational History    Occupation: ophth tech [address]   Tobacco Use    Smoking status: Never Smoker    Smokeless tobacco: Never Used   Substance and Sexual Activity    Alcohol use: Yes     Comment: occasionally    Drug use: No    Sexual activity: Not on file   Other Topics Concern    Not on file   Social History Narrative    Not on file     Social Determinants of Health     Financial Resource Strain:     Difficulty of Paying Living Expenses: Not on file   Food Insecurity:     Worried About [address] in the Last Year: Not on file    [address] in the Last Year: Not on file   Transportation Needs:     Lack of Transportation (Medical): Not on file    Lack of Transportation (Non-Medical):  Not on file   Physical Activity:     Days of Exercise per Week: Not on file    Minutes of Exercise per Session: Not on file   Stress:     Feeling of Stress : Not on file   Social Connections:     Frequency of Communication with Friends and Family: Not on file    Frequency of Social Gatherings with Friends and Family: Not on file    Attends Taoist Services: Not on file   [facility] or Organizations: Not on file    Attends Club or Organization Meetings: Not on file    Marital Status: Not on file   Intimate Partner Violence:     Fear of Current or Ex-Partner: Not on file    Emotionally Abused: Not on file    Physically Abused: Not on file    Sexually Abused: Not on file   Housing Stability:     Unable to Pay for Housing in the Last Year: Not on file    Number of [place] in the Last Year: Not on file    Unstable Housing in the Last Year: Not on file     Current Outpatient Medications   Medication Sig    EnbreL SureClick 50 mg/mL (1 mL) injection     Insulin Needles, Disposable, (Novofine 32) 32 gauge x 1/4\" ndle To use with Saxenda injections daily    escitalopram oxalate (LEXAPRO) 20 mg tablet TAKE 1 TABLET DAILY    zolpidem (AMBIEN) 5 mg tablet TAKE 1 TABLET AT BEDTIME ASNEEDED    pantoprazole (PROTONIX) 40 mg tablet Take 1 Tablet by mouth daily.  benazepriL (LOTENSIN) 10 mg tablet TAKE 1 TABLET DAILY (SOLCO MFR)    azelastine (ASTELIN) 137 mcg (0.1 %) nasal spray 2 Sprays by Both Nostrils route two (2) times a day. Use in each nostril as directed    ergocalciferol (ERGOCALCIFEROL) 1,250 mcg (50,000 unit) capsule TAKE 1 CAPSULE EVERY 14    DAYS    hydrOXYchloroQUINE (Plaquenil) 200 mg tablet Take 200 mg by mouth two (2) times a day.  LORazepam (ATIVAN) 0.5 mg tablet Take 1 Tab by mouth every twelve (12) hours as needed for Anxiety. Max Daily Amount: 1 mg.  estradiol (ESTRACE) 0.5 mg tablet Take  by mouth daily.  multivitamin (ONE A DAY) tablet Take 1 Tab by mouth daily.  estradioL (ESTRACE) 1 mg tablet      No current facility-administered medications for this visit.      Allergies   Allergen Reactions    Augmentin [Amoxicillin-Pot Clavulanate] Rash    Avelox [Moxifloxacin] Rash    Belsomra [Suvorexant] Other (comments)     anxiety    Cyclobenzaprine Other (comments)    Cymbalta [Duloxetine] Unknown (comments)     Pt unsure      Elavil Unknown (comments)     Pt unsure      Lexapro [Escitalopram] Other (comments)     Wt gain    Lyrica [Pregabalin] Other (comments)     Wt gain and double vision    Motrin [Ibuprofen] Rash    Nsaids (Non-Steroidal Anti-Inflammatory Drug) Other (comments)     H/o PUD    Paxil [Paroxetine Hcl] Other (comments)     Weight gain    Pcn [Penicillins] Rash    Potassium Clavulanate Rash    Saxenda [Liraglutide] Diarrhea    Trazodone Unknown (comments)     Pt unsure       REVIEW OF SYSTEMS: mammo 6/22, gyn Dr Giovanni Christensen 2021, colo 6/16 Dr Lc Love, [address] 10/20  Ophtho - no vision change or eye pain  Oral - no mouth pain, tongue or tooth problems  Ears - no hearing loss, ear pain, fullness, no swallowing problems  Cardiac - no CP, PND, orthopnea, edema, palpitations or syncope  Chest - no breast masses  Resp - no wheezing, chronic coughing, dyspnea  GI - no heartburn, nausea, vomiting, bleeding, hemorrhoids  Urinary - no dysuria, hematuria, flank pain, urgency, frequency    Visit Vitals  /60   Pulse 76   Temp 97 °F (36.1 °C) (Temporal)   Resp 16   Ht 5' (1.524 m)   Wt 154 lb (69.9 kg)   SpO2 97%   BMI 30.08 kg/m²   A&O x3  Affect is appropriate. Mood stable  No apparent distress  Anicteric, no JVD, adenopathy or thyromegaly. No carotid bruits or radiated murmur  Lungs clear to auscultation, no wheezes or rales  Heart showed regular rate and rhythm. No murmur, rubs, gallops  Abdomen soft nontender, no hepatosplenomegaly or masses. Extremities without edema.   Pulses 1-2+ symmetrically    LABS  From 7/10 showed   gluc 96, cr 0.90, gfr>60, alt 61,        chol 210, tg 179, hdl 41, ldl-c 133, wbc 6.0, hb 14.3, plt 268, ua neg  From 6/12 showed   gluc 87, cr 0.80, gfr 84,  alt 23, ldl-p 1405,                            tsh 1.46, vit d 46.6  From 1/13 showed                 ldl-p 1483, chol 200, tg 122, hdl 57, ldl-c 119  From 8/13 showed   gluc 99, cr 0.90, alt 32,          chol 190, tg 68,   hdl 47, ldl-c 129, wbc 6.3, hb 13.7, plt 310, ra neg  From 2/14 showed                 ldl-p 1423, chol 173, tg 100, hdl 47, ldl-c 106  From 4/14 showed                                     vit d 52.7, b12 538, fol>20  From 4/14 showed                                 achR ab neg  From 6/14 showed                      lobo neg, esr neg, vgcc ab neg  From 10/14 showed                      ace nl,    musk ab neg, lyme wb neg  From 4/15 showed   gluc 92, cr 0.92, gfr 70,         chol 202, tg 151, hdl 42, ldl-c 130, wbc 6.2, hb 13.4, plt 323  From 9/15 showed             wbc 6.2, hb 13.8, plt 302, lip 201,    ck/trop-  From 4/16 showed   gluc 90, cr 0.93, gfr 68 alt 23,         chol 204, tg 187, hdl 54, ldl-c 113, wbc 6.7, hb 14.4, plt 347, tsh 2.08, vit d 40.9, ft4 0.85, 24 hr U ca 123, 24h U patria 53, spep neg  From 5/16 showed                                 pth 29, tTgAb neg  From 9/16 showed                                 LDST neg, ua neg pro  From 11/17 showed gluc 92, cr 0.88, gfr 73, alt 25,         chol 200, tg 174, hdl 61, ldl-c 104, wbc 6.5, hb 13.3, plt 305, hep c-   , vit d 81.3  From 12/18 showed gluc 94, cr 0.89, gfr 71, alt 25,                      vit d 52.2  From 5/19 showed                          esr 14, RA neg, ccp 170  From 6/20 showed   gluc 74, cr 1.00, gfr 56, alt 32,         chol 204, tg 201, hdl 75, ldl-c 88,   wbc 8.5, hb 13.4, plt 326    Results for orders placed or performed during the hospital encounter of 06/21/22   CBC W/O DIFF   Result Value Ref Range    WBC 7.9 4.6 - 13.2 K/uL    RBC 4.20 4. 20 - 5.30 M/uL    HGB 13.3 12.0 - 16.0 g/dL    HCT 41.3 35.0 - 45.0 %    MCV 98.3 78.0 - 100.0 FL    MCH 31.7 24.0 - 34.0 PG    MCHC 32.2 31.0 - 37.0 g/dL    RDW 14.0 11.6 - 14.5 %    PLATELET 462 609 - 655 K/uL    MPV 10.3 9.2 - 11.8 FL    NRBC 0.0 0  WBC    ABSOLUTE NRBC 0.00 0.00 - 0.02 K/uL   METABOLIC PANEL, COMPREHENSIVE   Result Value Ref Range    Sodium 142 136 - 145 mmol/L    Potassium 4.2 3.5 - 5.5 mmol/L    Chloride 109 100 - 111 mmol/L    CO2 28 21 - 32 mmol/L    Anion gap 5 3.0 - 18 mmol/L    Glucose 74 74 - 99 mg/dL    BUN 19 (H) 7.0 - 18 MG/DL    Creatinine 0.92 0.6 - 1.3 MG/DL    BUN/Creatinine ratio 21 (H) 12 - 20      GFR est AA >60 >60 ml/min/1.73m2    GFR est non-AA >60 >60 ml/min/1.73m2    Calcium 8.7 8.5 - 10.1 MG/DL    Bilirubin, total 0.4 0.2 - 1.0 MG/DL    ALT (SGPT) 28 13 - 56 U/L    AST (SGOT) 21 10 - 38 U/L    Alk. phosphatase 42 (L) 45 - 117 U/L    Protein, total 6.9 6.4 - 8.2 g/dL    Albumin 3.6 3.4 - 5.0 g/dL    Globulin 3.3 2.0 - 4.0 g/dL    A-G Ratio 1.1 0.8 - 1.7     LIPID PANEL   Result Value Ref Range    LIPID PROFILE          Cholesterol, total 170 <200 MG/DL    Triglyceride 175 (H) <150 MG/DL    HDL Cholesterol 76 (H) 40 - 60 MG/DL    LDL, calculated 59 0 - 100 MG/DL    VLDL, calculated 35 MG/DL    CHOL/HDL Ratio 2.2 0 - 5.0     TSH 3RD GENERATION   Result Value Ref Range    TSH 2.27 0.36 - 3.74 uIU/mL     We reviewed the patient's labs from the last several visits to point out trends in the numbers            Patient Active Problem List   Diagnosis Code    Depression and anxiety F32. A    Osteopenia 2007 Dr. Maxime Jung, FRAX 5.2/0.5 10/12 M85.80    Thyroid nodules neg bx Dr. Nika Sofia 2009 E04.1    Dyslipidemia E78.5    Essential hypertension I10    Gastroesophageal reflux disease without esophagitis K21.9    Overweight (BMI 25.0-29. 9) E66.3    Chronic constipation K59.09    YOSELIN (stress urinary incontinence, female) N39.3    Fibromyalgia M79.7    Rheumatoid arthritis of multiple sites with negative rheumatoid factor (HCC) M06.09    Chronic insomnia F51.04     Assessment and plan:  1. Allergic rhinitis. Continue current  2. GERD. PPI and avoidance measures  3. Hypertension. Continue current  4. Lipids. Lifestyle and dietary measures  5. Thyroid nodules. F/U Dr. Tamir Earl as needed  6. Osteopenia.  Continue current and f/u  [facility]  7.  RA. Per Dr Maicol Li  8. Depression and anxiety. Continue current  9. Constipation. Continue citrucel  10. Insomnia. Continue current regimen. 11.  Obesity. Intermittent fasting discussed at length. Explained the concepts in detail. Went over possible physiologic changes that could occur and how that would possibly impact his/her situation in a positive way. Discussed 16:8 program in particular. She will do some more research and consider implementing in the near future, standard handout given. Already long low carb approach, portion control        RTC 6/23    Above conditions discussed at length and patient vocalized understanding. All questions answered to patient satisfaction        ICD-10-CM ICD-9-CM    1. Physical exam  Z00.00 V70.9 CBC W/O DIFF      METABOLIC PANEL, COMPREHENSIVE      LIPID PANEL      T4, FREE      TSH 3RD GENERATION   2. Essential hypertension  I10 401.9    3. Gastroesophageal reflux disease without esophagitis  K21.9 530.81    4. Thyroid nodules neg bx Dr. Rosario Lipoma 2009  E04.1 241.0 T4, FREE      TSH 3RD GENERATION   5. Rheumatoid arthritis of multiple sites with negative rheumatoid factor (HCC)  M06.09 714.0    6. Dyslipidemia  E78.5 272.4    7. Depression, unspecified depression type  F32. A 311    8. Obesity (BMI 30.0-34. 9)  E66.9 278.00

## 2022-06-21 ENCOUNTER — APPOINTMENT (OUTPATIENT)
Dept: INTERNAL MEDICINE CLINIC | Age: 62
End: 2022-06-21

## 2022-06-21 ENCOUNTER — HOSPITAL ENCOUNTER (OUTPATIENT)
Dept: LAB | Age: 62
Discharge: HOME | End: 2022-06-21
Payer: COMMERCIAL

## 2022-06-21 DIAGNOSIS — Z00.00 PHYSICAL EXAM: ICD-10-CM

## 2022-06-21 LAB
ALBUMIN SERPL-MCNC: 3.6 G/DL (ref 3.4–5)
ALBUMIN/GLOB SERPL: 1.1 {RATIO} (ref 0.8–1.7)
ALP SERPL-CCNC: 42 U/L (ref 45–117)
ALT SERPL-CCNC: 28 U/L (ref 13–56)
ANION GAP SERPL CALC-SCNC: 5 MMOL/L (ref 3–18)
AST SERPL-CCNC: 21 U/L (ref 10–38)
BILIRUB SERPL-MCNC: 0.4 MG/DL (ref 0.2–1)
BUN SERPL-MCNC: 19 MG/DL (ref 7–18)
BUN/CREAT SERPL: 21 (ref 12–20)
CALCIUM SERPL-MCNC: 8.7 MG/DL (ref 8.5–10.1)
CHLORIDE SERPL-SCNC: 109 MMOL/L (ref 100–111)
CHOLEST SERPL-MCNC: 170 MG/DL
CO2 SERPL-SCNC: 28 MMOL/L (ref 21–32)
CREAT SERPL-MCNC: 0.92 MG/DL (ref 0.6–1.3)
ERYTHROCYTE [DISTWIDTH] IN BLOOD BY AUTOMATED COUNT: 14 % (ref 11.6–14.5)
GLOBULIN SER CALC-MCNC: 3.3 G/DL (ref 2–4)
GLUCOSE SERPL-MCNC: 74 MG/DL (ref 74–99)
HCT VFR BLD AUTO: 41.3 % (ref 35–45)
HDLC SERPL-MCNC: 76 MG/DL (ref 40–60)
HDLC SERPL: 2.2 {RATIO} (ref 0–5)
HGB BLD-MCNC: 13.3 G/DL (ref 12–16)
LDLC SERPL CALC-MCNC: 59 MG/DL (ref 0–100)
LIPID PROFILE,FLP: ABNORMAL
MCH RBC QN AUTO: 31.7 PG (ref 24–34)
MCHC RBC AUTO-ENTMCNC: 32.2 G/DL (ref 31–37)
MCV RBC AUTO: 98.3 FL (ref 78–100)
NRBC # BLD: 0 K/UL (ref 0–0.01)
NRBC BLD-RTO: 0 PER 100 WBC
PLATELET # BLD AUTO: 260 K/UL (ref 135–420)
PMV BLD AUTO: 10.3 FL (ref 9.2–11.8)
POTASSIUM SERPL-SCNC: 4.2 MMOL/L (ref 3.5–5.5)
PROT SERPL-MCNC: 6.9 G/DL (ref 6.4–8.2)
RBC # BLD AUTO: 4.2 M/UL (ref 4.2–5.3)
SODIUM SERPL-SCNC: 142 MMOL/L (ref 136–145)
TRIGL SERPL-MCNC: 175 MG/DL (ref ?–150)
TSH SERPL DL<=0.05 MIU/L-ACNC: 2.27 UIU/ML (ref 0.36–3.74)
VLDLC SERPL CALC-MCNC: 35 MG/DL
WBC # BLD AUTO: 7.9 K/UL (ref 4.6–13.2)

## 2022-06-21 PROCEDURE — 80053 COMPREHEN METABOLIC PANEL: CPT

## 2022-06-21 PROCEDURE — 85027 COMPLETE CBC AUTOMATED: CPT

## 2022-06-21 PROCEDURE — 84443 ASSAY THYROID STIM HORMONE: CPT

## 2022-06-21 PROCEDURE — 36415 COLL VENOUS BLD VENIPUNCTURE: CPT

## 2022-06-21 PROCEDURE — 80061 LIPID PANEL: CPT

## 2022-06-28 ENCOUNTER — OFFICE VISIT (OUTPATIENT)
Dept: INTERNAL MEDICINE CLINIC | Age: 62
End: 2022-06-28
Payer: COMMERCIAL

## 2022-06-28 VITALS
TEMPERATURE: 97 F | BODY MASS INDEX: 30.23 KG/M2 | HEIGHT: 60 IN | RESPIRATION RATE: 16 BRPM | DIASTOLIC BLOOD PRESSURE: 60 MMHG | SYSTOLIC BLOOD PRESSURE: 119 MMHG | HEART RATE: 76 BPM | OXYGEN SATURATION: 97 % | WEIGHT: 154 LBS

## 2022-06-28 DIAGNOSIS — E04.1 THYROID NODULE: ICD-10-CM

## 2022-06-28 DIAGNOSIS — E66.9 OBESITY (BMI 30.0-34.9): ICD-10-CM

## 2022-06-28 DIAGNOSIS — M06.09 RHEUMATOID ARTHRITIS OF MULTIPLE SITES WITH NEGATIVE RHEUMATOID FACTOR (HCC): ICD-10-CM

## 2022-06-28 DIAGNOSIS — Z00.00 PHYSICAL EXAM: Primary | ICD-10-CM

## 2022-06-28 DIAGNOSIS — F32.A DEPRESSION, UNSPECIFIED DEPRESSION TYPE: ICD-10-CM

## 2022-06-28 DIAGNOSIS — E78.5 DYSLIPIDEMIA: ICD-10-CM

## 2022-06-28 DIAGNOSIS — I10 ESSENTIAL HYPERTENSION: ICD-10-CM

## 2022-06-28 DIAGNOSIS — K21.9 GASTROESOPHAGEAL REFLUX DISEASE WITHOUT ESOPHAGITIS: ICD-10-CM

## 2022-06-28 PROCEDURE — 99396 PREV VISIT EST AGE 40-64: CPT | Performed by: INTERNAL MEDICINE

## 2022-06-28 RX ORDER — ETANERCEPT 50 MG/ML
SOLUTION SUBCUTANEOUS
COMMUNITY
Start: 2022-05-24

## 2022-06-28 RX ORDER — ESTRADIOL 1 MG/1
TABLET ORAL
COMMUNITY
Start: 2022-05-20

## 2022-06-28 NOTE — PROGRESS NOTES
Geralynn Apley presents with   Chief Complaint   Patient presents with   [employer] Physical    Hypertension    Labs     6-21-22          1. \"Have you been to the ER, urgent care clinic since your last visit? Hospitalized since your last visit? \" No    2. \"Have you seen or consulted any other health care providers outside of the [address] [place] [address] since your last visit? \" [facility]     3. For patients aged 39-70: Has the patient had a colonoscopy / FIT/ Cologuard? Yes - no Care Gap present      If the patient is female:    4. For patients aged 41-77: Has the patient had a mammogram within the past 2 years? Yes - no Care Gap present      5. For patients aged 21-65: Has the patient had a pap smear?  No Not a candidate

## 2022-07-08 DIAGNOSIS — F51.04 CHRONIC INSOMNIA: ICD-10-CM

## 2022-07-08 RX ORDER — ERGOCALCIFEROL 1.25 MG/1
CAPSULE ORAL
Qty: 6 CAPSULE | Refills: 3 | Status: SHIPPED | OUTPATIENT
Start: 2022-07-08

## 2022-07-10 RX ORDER — ZOLPIDEM TARTRATE 5 MG/1
TABLET ORAL
Qty: 90 TABLET | Refills: 1 | Status: SHIPPED | OUTPATIENT
Start: 2022-07-10

## 2022-07-18 ENCOUNTER — TELEPHONE (OUTPATIENT)
Dept: INTERNAL MEDICINE CLINIC | Age: 62
End: 2022-07-18

## 2022-07-18 DIAGNOSIS — U07.1 COVID-19 VIRUS INFECTION: Primary | ICD-10-CM

## 2022-07-18 NOTE — TELEPHONE ENCOUNTER
Patient stating she tested positive for COVID this morning. Symptoms started yesterday. Cough, congestion and fever.  Stating she is immunocompromised and wants to know if something can be sent in to [facility].

## 2022-07-18 NOTE — TELEPHONE ENCOUNTER
[facility]  If not available, call around to nearest [facility] as they carry it the most consistently  Follow pulse ox, go to er if sob and persistently<92%

## 2022-08-03 RX ORDER — AZELASTINE 1 MG/ML
SPRAY, METERED NASAL
Qty: 3 EACH | Refills: 3 | Status: SHIPPED | OUTPATIENT
Start: 2022-08-03

## 2022-10-19 ENCOUNTER — TELEPHONE (OUTPATIENT)
Dept: INTERNAL MEDICINE CLINIC | Age: 62
End: 2022-10-19

## 2022-10-19 NOTE — TELEPHONE ENCOUNTER
Sara Dumont is calling from [facility] in regards to the Lexapro 20 mg. Their system is indicating the patient is allergic or sensitive to Serotonin Reuptake Inhibitor  They need to know if she is she truly allergic or if it is ok to fill for the patient.     Ref #: [id number]    [phone] option 2

## 2022-10-20 ENCOUNTER — HOSPITAL ENCOUNTER (OUTPATIENT)
Dept: GENERAL RADIOLOGY | Age: 62
Discharge: HOME | End: 2022-10-20

## 2022-10-20 DIAGNOSIS — M06.09 RHEUMATOID ARTHRITIS WITHOUT RHEUMATOID FACTOR, MULTIPLE SITES (HCC): ICD-10-CM

## 2022-10-20 PROCEDURE — 73521 X-RAY EXAM HIPS BI 2 VIEWS: CPT

## 2022-10-20 PROCEDURE — 71046 X-RAY EXAM CHEST 2 VIEWS: CPT

## 2022-10-20 NOTE — TELEPHONE ENCOUNTER
[facility] pharmacy reached. Patient identity, identified with two identifiers (Name & ). Patient's prescription for escitalopram oxalate (LEXAPRO) 20 mg will be filled and mailed to patient.

## 2022-10-28 ENCOUNTER — E-VISIT (OUTPATIENT)
Dept: INTERNAL MEDICINE CLINIC | Age: 62
End: 2022-10-28
Payer: COMMERCIAL

## 2022-10-28 DIAGNOSIS — J06.9 UPPER RESPIRATORY TRACT INFECTION, UNSPECIFIED TYPE: Primary | ICD-10-CM

## 2022-10-28 PROCEDURE — 99441 PR PHYS/QHP TELEPHONE EVALUATION 5-10 MIN: CPT | Performed by: INTERNAL MEDICINE

## 2022-10-28 RX ORDER — BENZONATATE 100 MG/1
CAPSULE ORAL
Qty: 40 CAPSULE | Refills: 0 | Status: SHIPPED | OUTPATIENT
Start: 2022-10-28

## 2022-10-29 RX ORDER — DOXYCYCLINE 100 MG/1
100 TABLET ORAL 2 TIMES DAILY
Qty: 20 TABLET | Refills: 0 | Status: SHIPPED | OUTPATIENT
Start: 2022-10-29 | End: 2022-11-08

## 2022-10-29 NOTE — PROGRESS NOTES
Evisit documentation reviewed  Aleta Fisher called in   Email sent to pt and she will update us if no better

## 2022-11-18 ENCOUNTER — PATIENT MESSAGE (OUTPATIENT)
Dept: INTERNAL MEDICINE CLINIC | Age: 62
End: 2022-11-18

## 2022-11-18 DIAGNOSIS — K21.9 GASTROESOPHAGEAL REFLUX DISEASE WITHOUT ESOPHAGITIS: ICD-10-CM

## 2022-11-18 RX ORDER — PANTOPRAZOLE SODIUM 40 MG/1
40 TABLET, DELAYED RELEASE ORAL DAILY
Qty: 90 TABLET | Refills: 3 | Status: SHIPPED | OUTPATIENT
Start: 2022-11-18

## 2022-11-18 RX ORDER — BENAZEPRIL HYDROCHLORIDE 10 MG/1
TABLET ORAL
Qty: 90 TABLET | Refills: 3 | Status: SHIPPED | OUTPATIENT
Start: 2022-11-18

## 2022-11-18 RX ORDER — PANTOPRAZOLE SODIUM 40 MG/1
40 TABLET, DELAYED RELEASE ORAL DAILY
Qty: 90 TABLET | Refills: 3 | Status: SHIPPED | OUTPATIENT
Start: 2022-11-18 | End: 2022-11-18 | Stop reason: SDUPTHER

## 2022-11-18 RX ORDER — BENAZEPRIL HYDROCHLORIDE 10 MG/1
TABLET ORAL
Qty: 90 TABLET | Refills: 3 | Status: SHIPPED | OUTPATIENT
Start: 2022-11-18 | End: 2022-11-18 | Stop reason: SDUPTHER

## 2022-11-18 NOTE — TELEPHONE ENCOUNTER
Requested Prescriptions     Pending Prescriptions Disp Refills    benazepriL (LOTENSIN) 10 mg tablet 90 Tablet 3

## 2022-11-18 NOTE — TELEPHONE ENCOUNTER
From: Anton Spaulding  To: Man Valentin MD  Sent: 11/18/2022 1:25 PM EST  Subject: Medication sent to wrong pharmacy     Please correct the refills you sent for Lotensin and Protonix to [facility] for 90 day supply- they were sent to local [facility] - I get all regular meds at [facility] for 90 days.

## 2022-11-18 NOTE — TELEPHONE ENCOUNTER
Requested Prescriptions     Pending Prescriptions Disp Refills    pantoprazole (PROTONIX) 40 mg tablet 90 Tablet 3     Sig: Take 1 Tablet by mouth daily.

## 2023-01-05 DIAGNOSIS — F51.04 CHRONIC INSOMNIA: ICD-10-CM

## 2023-01-06 NOTE — TELEPHONE ENCOUNTER
[facility] reviewed 1/6/2023    The last fill date for zolpidem was 9/25/2022 for a 90 d/s qty 90      Last UDS: not on file    CSA Last signed: not on file        PCP: Fran Pardo MD    Last appt: 6/28/2022  Future Appointments   Date Time Provider Lian Bustamante   6/22/2023  7:55 AM [facility] LAB VISIT [facility] BS AMB   6/29/2023  8:00 AM Curt Falcon MD [facility] BS AMB       Requested Prescriptions     Pending Prescriptions Disp Refills    zolpidem (AMBIEN) 5 mg tablet 90 Tablet 1

## 2023-01-07 RX ORDER — ESCITALOPRAM OXALATE 20 MG/1
TABLET ORAL
Qty: 90 TABLET | Refills: 3 | Status: SHIPPED | OUTPATIENT
Start: 2023-01-07

## 2023-01-07 RX ORDER — ZOLPIDEM TARTRATE 5 MG/1
5 TABLET ORAL
Qty: 90 TABLET | Refills: 1 | Status: SHIPPED | OUTPATIENT
Start: 2023-01-07

## 2023-01-16 NOTE — TELEPHONE ENCOUNTER
PCP: Jadyn Parham MD    Last appt: 6/28/2022  Future Appointments   Date Time Provider Lian Bustamante   6/22/2023  7:55 AM [facility] LAB VISIT [facility] AMB   6/29/2023  8:00 AM Dot Falcon MD [facility] BS AMB       Requested Prescriptions     Pending Prescriptions Disp Refills    escitalopram oxalate (LEXAPRO) 20 mg tablet 90 Tablet 3     Sig: TAKE 1 TABLET DAILY

## 2023-01-17 RX ORDER — ESCITALOPRAM OXALATE 20 MG/1
TABLET ORAL
Qty: 90 TABLET | Refills: 3 | Status: SHIPPED | OUTPATIENT
Start: 2023-01-17

## 2023-03-09 ENCOUNTER — PATIENT MESSAGE (OUTPATIENT)
Age: 63
End: 2023-03-09

## 2023-03-10 RX ORDER — PANTOPRAZOLE SODIUM 40 MG/1
40 TABLET, DELAYED RELEASE ORAL DAILY
Qty: 30 TABLET | Refills: 0 | Status: SHIPPED | OUTPATIENT
Start: 2023-03-10

## 2023-03-10 NOTE — TELEPHONE ENCOUNTER
From: Bule Nava  To: Dr. Saldana Expose: 3/9/2023 3:37 PM EST  Subject: Pantoprazole     Please send a new RX to [facility]. My prescription insurance changed a they need a new RX and prior authorization to refill this.  Thanks

## 2023-05-16 ENCOUNTER — TELEPHONE (OUTPATIENT)
Age: 63
End: 2023-05-16

## 2023-05-16 DIAGNOSIS — Z00.00 PHYSICAL EXAM: Primary | ICD-10-CM

## 2023-05-16 NOTE — TELEPHONE ENCOUNTER
----- Message from Urvashi Diaz sent at 2023 10:52 AM EDT -----  Regarding: Pre op labs   Contact: [phone]  I am scheduled for the followin/8 9:40 pre-op   7:55 labs for regular physical    8:09 regular physical   My question-  Can I get the labs ordered at [facility] before my pre op ? And can we do the physical same day as pre op and cancel ?

## 2023-05-19 DIAGNOSIS — F51.04 CHRONIC INSOMNIA: Primary | ICD-10-CM

## 2023-05-23 RX ORDER — ZOLPIDEM TARTRATE 5 MG/1
TABLET ORAL
Qty: 90 TABLET | Refills: 1 | Status: SHIPPED | OUTPATIENT
Start: 2023-05-23 | End: 2023-11-19

## 2023-06-01 ENCOUNTER — HOSPITAL ENCOUNTER (OUTPATIENT)
Facility: HOSPITAL | Age: 63
Discharge: HOME | End: 2023-06-01
Payer: COMMERCIAL

## 2023-06-01 LAB
ALBUMIN SERPL-MCNC: 3.5 G/DL (ref 3.4–5)
ALBUMIN/GLOB SERPL: 1.1 (ref 0.8–1.7)
ALP SERPL-CCNC: 47 U/L (ref 45–117)
ALT SERPL-CCNC: 29 U/L (ref 13–56)
ANION GAP SERPL CALC-SCNC: 4 MMOL/L (ref 3–18)
AST SERPL-CCNC: 23 U/L (ref 10–38)
BASOPHILS # BLD: 0.1 K/UL (ref 0–0.1)
BASOPHILS NFR BLD: 1 % (ref 0–2)
BILIRUB SERPL-MCNC: 0.3 MG/DL (ref 0.2–1)
BUN SERPL-MCNC: 26 MG/DL (ref 7–18)
BUN/CREAT SERPL: 25 (ref 12–20)
CALCIUM SERPL-MCNC: 8.9 MG/DL (ref 8.5–10.1)
CHLORIDE SERPL-SCNC: 108 MMOL/L (ref 100–111)
CHOLEST SERPL-MCNC: 213 MG/DL
CO2 SERPL-SCNC: 31 MMOL/L (ref 21–32)
CREAT SERPL-MCNC: 1.05 MG/DL (ref 0.6–1.3)
DIFFERENTIAL METHOD BLD: ABNORMAL
EOSINOPHIL # BLD: 0.3 K/UL (ref 0–0.4)
EOSINOPHIL NFR BLD: 3 % (ref 0–5)
ERYTHROCYTE [DISTWIDTH] IN BLOOD BY AUTOMATED COUNT: 13.4 % (ref 11.6–14.5)
GLOBULIN SER CALC-MCNC: 3.3 G/DL (ref 2–4)
GLUCOSE SERPL-MCNC: 82 MG/DL (ref 74–99)
HBA1C MFR BLD: 5.1 % (ref 4.2–5.6)
HCT VFR BLD AUTO: 43.1 % (ref 35–45)
HDLC SERPL-MCNC: 91 MG/DL (ref 40–60)
HDLC SERPL: 2.3 (ref 0–5)
HGB BLD-MCNC: 13.8 G/DL (ref 12–16)
IMM GRANULOCYTES # BLD AUTO: 0 K/UL (ref 0–0.04)
IMM GRANULOCYTES NFR BLD AUTO: 0 % (ref 0–0.5)
LDLC SERPL CALC-MCNC: 92.6 MG/DL (ref 0–100)
LIPID PANEL: ABNORMAL
LYMPHOCYTES # BLD: 2.6 K/UL (ref 0.9–3.6)
LYMPHOCYTES NFR BLD: 30 % (ref 21–52)
MCH RBC QN AUTO: 31.7 PG (ref 24–34)
MCHC RBC AUTO-ENTMCNC: 32 G/DL (ref 31–37)
MCV RBC AUTO: 98.9 FL (ref 78–100)
MONOCYTES # BLD: 1.1 K/UL (ref 0.05–1.2)
MONOCYTES NFR BLD: 12 % (ref 3–10)
NEUTS SEG # BLD: 4.6 K/UL (ref 1.8–8)
NEUTS SEG NFR BLD: 53 % (ref 40–73)
NRBC # BLD: 0 K/UL (ref 0–0.01)
NRBC BLD-RTO: 0 PER 100 WBC
PLATELET # BLD AUTO: 300 K/UL (ref 135–420)
PMV BLD AUTO: 10 FL (ref 9.2–11.8)
POTASSIUM SERPL-SCNC: 4.2 MMOL/L (ref 3.5–5.5)
PROT SERPL-MCNC: 6.8 G/DL (ref 6.4–8.2)
RBC # BLD AUTO: 4.36 M/UL (ref 4.2–5.3)
SODIUM SERPL-SCNC: 143 MMOL/L (ref 136–145)
T4 FREE SERPL-MCNC: 1 NG/DL (ref 0.7–1.5)
TRIGL SERPL-MCNC: 147 MG/DL
TSH SERPL DL<=0.05 MIU/L-ACNC: 2.33 UIU/ML (ref 0.36–3.74)
VLDLC SERPL CALC-MCNC: 29.4 MG/DL
WBC # BLD AUTO: 8.6 K/UL (ref 4.6–13.2)

## 2023-06-01 PROCEDURE — 80061 LIPID PANEL: CPT

## 2023-06-01 PROCEDURE — 85025 COMPLETE CBC W/AUTO DIFF WBC: CPT

## 2023-06-01 PROCEDURE — 83036 HEMOGLOBIN GLYCOSYLATED A1C: CPT

## 2023-06-01 PROCEDURE — 36415 COLL VENOUS BLD VENIPUNCTURE: CPT

## 2023-06-01 PROCEDURE — 84443 ASSAY THYROID STIM HORMONE: CPT

## 2023-06-01 PROCEDURE — 84439 ASSAY OF FREE THYROXINE: CPT

## 2023-06-01 PROCEDURE — 80053 COMPREHEN METABOLIC PANEL: CPT

## 2023-06-07 NOTE — PROGRESS NOTES
Tatyana Acevedo presents today for   Chief Complaint   Patient presents with    Pre-op Exam     [facility]     1. \"Have you been to the ER, urgent care clinic since your last visit? Hospitalized since your last visit? \" no    2. \"Have you seen or consulted any other health care providers outside of the [address] [place] [address] since your last visit? \" no     3. For patients aged 39-70: Has the patient had a colonoscopy / FIT/ Cologuard? Yes - no Care Gap present      If the patient is female:    4. For patients aged 41-77: Has the patient had a mammogram within the past 2 years? Yes - no Care Gap present      5. For patients aged 21-65: Has the patient had a pap smear?  Yes - no Care Gap present

## 2023-06-08 ENCOUNTER — HOSPITAL ENCOUNTER (OUTPATIENT)
Facility: HOSPITAL | Age: 63
Setting detail: SPECIMEN
Discharge: HOME | End: 2023-06-08
Payer: COMMERCIAL

## 2023-06-08 ENCOUNTER — OFFICE VISIT (OUTPATIENT)
Age: 63
End: 2023-06-08

## 2023-06-08 VITALS
OXYGEN SATURATION: 98 % | TEMPERATURE: 97.6 F | HEIGHT: 61 IN | WEIGHT: 152 LBS | RESPIRATION RATE: 19 BRPM | BODY MASS INDEX: 28.7 KG/M2 | DIASTOLIC BLOOD PRESSURE: 69 MMHG | SYSTOLIC BLOOD PRESSURE: 118 MMHG | HEART RATE: 71 BPM

## 2023-06-08 DIAGNOSIS — K59.09 CHRONIC CONSTIPATION: ICD-10-CM

## 2023-06-08 DIAGNOSIS — K21.9 GASTROESOPHAGEAL REFLUX DISEASE WITHOUT ESOPHAGITIS: ICD-10-CM

## 2023-06-08 DIAGNOSIS — Z01.818 PRE-OP EXAM: ICD-10-CM

## 2023-06-08 DIAGNOSIS — E78.5 DYSLIPIDEMIA: ICD-10-CM

## 2023-06-08 DIAGNOSIS — Z01.818 PRE-OP EXAM: Primary | ICD-10-CM

## 2023-06-08 DIAGNOSIS — F32.A DEPRESSION, UNSPECIFIED DEPRESSION TYPE: ICD-10-CM

## 2023-06-08 DIAGNOSIS — I10 ESSENTIAL HYPERTENSION: ICD-10-CM

## 2023-06-08 DIAGNOSIS — M06.09 RHEUMATOID ARTHRITIS OF MULTIPLE SITES WITH NEGATIVE RHEUMATOID FACTOR (HCC): ICD-10-CM

## 2023-06-08 LAB
APPEARANCE UR: CLEAR
BILIRUB UR QL: NEGATIVE
COLOR UR: YELLOW
GLUCOSE UR STRIP.AUTO-MCNC: NEGATIVE MG/DL
HGB UR QL STRIP: NEGATIVE
KETONES UR QL STRIP.AUTO: NEGATIVE MG/DL
LEUKOCYTE ESTERASE UR QL STRIP.AUTO: NEGATIVE
NITRITE UR QL STRIP.AUTO: NEGATIVE
PH UR STRIP: 6 (ref 5–8)
PROT UR STRIP-MCNC: NEGATIVE MG/DL
SP GR UR REFRACTOMETRY: 1.01 (ref 1–1.03)
UROBILINOGEN UR QL STRIP.AUTO: 0.2 EU/DL (ref 0.2–1)

## 2023-06-08 PROCEDURE — 81003 URINALYSIS AUTO W/O SCOPE: CPT

## 2023-06-08 SDOH — ECONOMIC STABILITY: FOOD INSECURITY: WITHIN THE PAST 12 MONTHS, YOU WORRIED THAT YOUR FOOD WOULD RUN OUT BEFORE YOU GOT MONEY TO BUY MORE.: NEVER TRUE

## 2023-06-08 SDOH — ECONOMIC STABILITY: FOOD INSECURITY: WITHIN THE PAST 12 MONTHS, THE FOOD YOU BOUGHT JUST DIDN'T LAST AND YOU DIDN'T HAVE MONEY TO GET MORE.: NEVER TRUE

## 2023-06-08 SDOH — ECONOMIC STABILITY: HOUSING INSECURITY
IN THE LAST 12 MONTHS, WAS THERE A TIME WHEN YOU DID NOT HAVE A STEADY PLACE TO SLEEP OR SLEPT IN A SHELTER (INCLUDING NOW)?: NO

## 2023-06-08 SDOH — ECONOMIC STABILITY: INCOME INSECURITY: HOW HARD IS IT FOR YOU TO PAY FOR THE VERY BASICS LIKE FOOD, HOUSING, MEDICAL CARE, AND HEATING?: NOT HARD AT ALL

## 2023-06-08 ASSESSMENT — PATIENT HEALTH QUESTIONNAIRE - PHQ9
4. FEELING TIRED OR HAVING LITTLE ENERGY: 0
SUM OF ALL RESPONSES TO PHQ QUESTIONS 1-9: 0
8. MOVING OR SPEAKING SO SLOWLY THAT OTHER PEOPLE COULD HAVE NOTICED. OR THE OPPOSITE, BEING SO FIGETY OR RESTLESS THAT YOU HAVE BEEN MOVING AROUND A LOT MORE THAN USUAL: 0
7. TROUBLE CONCENTRATING ON THINGS, SUCH AS READING THE NEWSPAPER OR WATCHING TELEVISION: 0
2. FEELING DOWN, DEPRESSED OR HOPELESS: 0
SUM OF ALL RESPONSES TO PHQ QUESTIONS 1-9: 0
6. FEELING BAD ABOUT YOURSELF - OR THAT YOU ARE A FAILURE OR HAVE LET YOURSELF OR YOUR FAMILY DOWN: 0
10. IF YOU CHECKED OFF ANY PROBLEMS, HOW DIFFICULT HAVE THESE PROBLEMS MADE IT FOR YOU TO DO YOUR WORK, TAKE CARE OF THINGS AT HOME, OR GET ALONG WITH OTHER PEOPLE: 0
SUM OF ALL RESPONSES TO PHQ9 QUESTIONS 1 & 2: 0
3. TROUBLE FALLING OR STAYING ASLEEP: 0
SUM OF ALL RESPONSES TO PHQ QUESTIONS 1-9: 0
SUM OF ALL RESPONSES TO PHQ QUESTIONS 1-9: 0
5. POOR APPETITE OR OVEREATING: 0
9. THOUGHTS THAT YOU WOULD BE BETTER OFF DEAD, OR OF HURTING YOURSELF: 0
1. LITTLE INTEREST OR PLEASURE IN DOING THINGS: 0

## 2023-06-09 ENCOUNTER — TELEPHONE (OUTPATIENT)
Age: 63
End: 2023-06-09

## 2023-06-09 NOTE — TELEPHONE ENCOUNTER
Katlyn fong from [facility] in regards to patient pre op notes from when patient was in office yesterday. She was advise that they are waiting for her urinalysis and for the dr to finish the notes. Then the nurse will fax over notes.     Marcela Larose can be reached at [phone]  [phone]

## 2023-06-16 ENCOUNTER — HOSPITAL ENCOUNTER (OUTPATIENT)
Facility: HOSPITAL | Age: 63
Discharge: HOME | End: 2023-06-19
Attending: INTERNAL MEDICINE
Payer: COMMERCIAL

## 2023-06-16 DIAGNOSIS — Z12.39 BREAST SCREENING: ICD-10-CM

## 2023-06-16 PROCEDURE — 77063 BREAST TOMOSYNTHESIS BI: CPT

## 2023-07-02 RX ORDER — ERGOCALCIFEROL 1.25 MG/1
CAPSULE ORAL
Qty: 6 CAPSULE | Refills: 3 | Status: SHIPPED | OUTPATIENT
Start: 2023-07-02

## 2023-09-01 ENCOUNTER — TELEPHONE (OUTPATIENT)
Age: 63
End: 2023-09-01

## 2023-09-01 NOTE — TELEPHONE ENCOUNTER
----- Message from Анна Skaggs sent at 9/1/2023 12:16 AM EDT -----  Regarding: Ozempic  Contact: [phone]  I was wondering if Ozempic might be helpful for me. If I can try it, please send to [facility] [employer] [facility].   Thanks [employer]

## 2023-09-05 NOTE — TELEPHONE ENCOUNTER
Is this for wt loss?   Not covered by majority of insurances unless pt is diabetic  Out of pocket cost for wt loss usually>$1k/mo  She should double check with her insurance with above

## 2023-10-03 NOTE — PROGRESS NOTES
1. Have you been to the ER, urgent care clinic or hospitalized since your last visit? YES. Feb and Jan 2017 at [facility]    2. Have you seen or consulted any other health care providers outside of the [address] [place] [address] since your last visit (Include any pap smears or colon screening)? NO      Do you have an Advanced Directive? NO    Would you like information on Advanced Directives?  YES GLP-1 Agonists Antihyperglycemics Refill Protocol - 12 Month Protocol Passed 10/03/2023 03:53 PM   Protocol Details  eGFR resulted within last 12 months -- IF CRITERIA FAILED REFER TO PROTOCOL DETAILS    Seen by prescribing provider or same department within the last 12 months or has a future appt in 3 months - IF FAILED PLEASE LOOK AT CHART REVIEW FOR LAST VISIT AND PROCEED ACCORDINGLY    Patient is not pregnant    Lipid Panel resulted within last 15 months    Hgb A1c less than 8 within last 12 months looking at last value -- IF CRITERIA FAILED REFER TO PROTOCOL DETAILS    eGFR greater than 29 within last 12 months looking at last value    Medication (including dose and sig) on current meds list

## 2023-10-30 NOTE — TELEPHONE ENCOUNTER
Please refill if appropriate or refuse medication if not appropriate.     PCP: Idalia Wilson MD     Last appt: 6/8/23    Last refill: 11/18/22      Future Appointments   Date Time Provider [address] [place] [address]   6/3/2024  8:00 AM [facility] LAB VISIT [facility] BS AMB   6/10/2024  9:00 AM Rosibel Roman MD [facility] BS AMB         Requested Prescriptions     Pending Prescriptions Disp Refills    benazepril (LOTENSIN) 10 MG tablet [Pharmacy Med Name: BENAZEPRIL TAB 10MG] 90 tablet 3     Sig: TAKE 1 TABLET DAILY (SOLCO MFR)

## 2023-10-31 RX ORDER — BENAZEPRIL HYDROCHLORIDE 10 MG/1
TABLET ORAL
Qty: 90 TABLET | Refills: 3 | Status: SHIPPED | OUTPATIENT
Start: 2023-10-31

## 2023-11-01 DIAGNOSIS — F51.04 CHRONIC INSOMNIA: ICD-10-CM

## 2023-11-01 DIAGNOSIS — F41.9 ANXIETY: ICD-10-CM

## 2023-11-02 NOTE — TELEPHONE ENCOUNTER
[facility] reviewed 11/1/2023    The last fill date for Zolpidem Tartrate 5 Mg Tablet was 8/29/2023 for a 90 d/s qty 90    Last fill date for Lorazepam 0.5 Mg Tablet was 6/1/2023 for a 60 d/ qty 100    Last UDS: not on file     CSA Last signed: not on file         PCP: Nini Worley MD      Future Appointments   Date Time Provider [address] [place] [address]   6/3/2024  8:00 AM [facility] LAB V[facility]IT [facility] BS AMB   6/10/2024  9:00 AM Nini Worley MD [facility] BS AMB       Requested Prescriptions     Pending Prescriptions Disp Refills    LORazepam (ATIVAN) 0.5 MG tablet [Pharmacy Med Name: LORAZEPAM TAB 0.5MG] 100 tablet 0     Sig: TAKE 1 TABLET 2 TIMES DAILYAS NEEDED FOR ANXIETY FOR  UP TO 60 DAYS. MAXIMUM     DAILY AMOUNT: 1MG    zolpidem (AMBIEN) 5 MG tablet [Pharmacy Med Name: ZOLPIDEM TAB 5MG] 90 tablet 1     Sig: TAKE 1 TABLET NIGHTLY AS   NEEDED FOR SLEEP. MAXIMUM  DAILY AMOUNT: 5MG.

## 2023-11-03 RX ORDER — LORAZEPAM 0.5 MG/1
TABLET ORAL
Qty: 100 TABLET | Refills: 0 | Status: SHIPPED | OUTPATIENT
Start: 2023-11-03 | End: 2024-01-02

## 2023-11-03 RX ORDER — ZOLPIDEM TARTRATE 5 MG/1
TABLET ORAL
Qty: 90 TABLET | Refills: 1 | Status: SHIPPED | OUTPATIENT
Start: 2023-11-03 | End: 2024-05-01

## 2023-12-06 ENCOUNTER — PATIENT MESSAGE (OUTPATIENT)
Age: 63
End: 2023-12-06

## 2023-12-29 RX ORDER — ESCITALOPRAM OXALATE 20 MG/1
TABLET ORAL
Qty: 90 TABLET | Refills: 3 | Status: SHIPPED | OUTPATIENT
Start: 2023-12-29

## 2024-01-16 ENCOUNTER — OFFICE VISIT (OUTPATIENT)
Age: 64
End: 2024-01-16
Payer: MEDICARE

## 2024-01-16 VITALS
HEART RATE: 70 BPM | TEMPERATURE: 96.6 F | HEIGHT: 61 IN | OXYGEN SATURATION: 98 % | DIASTOLIC BLOOD PRESSURE: 61 MMHG | BODY MASS INDEX: 28.32 KG/M2 | WEIGHT: 150 LBS | SYSTOLIC BLOOD PRESSURE: 127 MMHG | RESPIRATION RATE: 16 BRPM

## 2024-01-16 DIAGNOSIS — F41.9 ANXIETY: Primary | ICD-10-CM

## 2024-01-16 PROCEDURE — 1036F TOBACCO NON-USER: CPT | Performed by: INTERNAL MEDICINE

## 2024-01-16 PROCEDURE — 3078F DIAST BP <80 MM HG: CPT | Performed by: INTERNAL MEDICINE

## 2024-01-16 PROCEDURE — G8419 CALC BMI OUT NRM PARAM NOF/U: HCPCS | Performed by: INTERNAL MEDICINE

## 2024-01-16 PROCEDURE — G8427 DOCREV CUR MEDS BY ELIG CLIN: HCPCS | Performed by: INTERNAL MEDICINE

## 2024-01-16 PROCEDURE — 3017F COLORECTAL CA SCREEN DOC REV: CPT | Performed by: INTERNAL MEDICINE

## 2024-01-16 PROCEDURE — 99213 OFFICE O/P EST LOW 20 MIN: CPT | Performed by: INTERNAL MEDICINE

## 2024-01-16 PROCEDURE — G8484 FLU IMMUNIZE NO ADMIN: HCPCS | Performed by: INTERNAL MEDICINE

## 2024-01-16 PROCEDURE — 99211 OFF/OP EST MAY X REQ PHY/QHP: CPT | Performed by: INTERNAL MEDICINE

## 2024-01-16 PROCEDURE — 3074F SYST BP LT 130 MM HG: CPT | Performed by: INTERNAL MEDICINE

## 2024-01-16 RX ORDER — DULOXETIN HYDROCHLORIDE 20 MG/1
20 CAPSULE, DELAYED RELEASE ORAL DAILY
Qty: 30 CAPSULE | Refills: 5 | Status: SHIPPED | OUTPATIENT
Start: 2024-01-16

## 2024-01-16 NOTE — PROGRESS NOTES
Maria Ines Das presents today for   Chief Complaint   Patient presents with    Medication Check     Anxiety medication    Follow-up     Routine Existing Conditions       States anxiety has gotten worse, would like to discuss medication.    1. \"Have you been to the ER, urgent care clinic since your last visit?  Hospitalized since your last visit?\" Yes    2. \"Have you seen or consulted any other health care providers outside of the [facility] since your last visit?\" No     3. For patients aged 45-75: Has the patient had a colonoscopy / FIT/ Cologuard? Yes - no Care Gap present      If the patient is female:    4. For patients aged 40-74: Has the patient had a mammogram within the past 2 years? Yes - no Care Gap present      5. For patients aged 21-65: Has the patient had a pap smear? Yes - no Care Gap present

## 2024-01-16 NOTE — PROGRESS NOTES
63 y.o. female who presents for evaluation.    She reports that her anxiety issues have been getting worse over the last few months.  It went to a boil New Year's when her mom got admitted to the hospital with bad pneumonia and mental status change.  She is currently on Lexapro 20 mg, has tried numerous other medications as noted below.  She is wanting to see if adding Cymbalta will be of help.  Of note, she had previously reported some type of intolerance to it but she wants to take it.  She is currently using the Ativan just about daily.  No vi or hallucinations, depression symptoms.    Past Medical History:   Diagnosis Date    Allergic rhinitis     Dr Mast    Anxiety 04/2014    ANNELISE-7 was 19/21    Chronic insomnia 06/17/2019    tried benadryl, trazodone; intol belsomra, doxepin; using ambien    Depression     has tried paxil, zoloft, wellbutrin, cymbalta, trazodone    Dyslipidemia     calculated 10 year risk score was 2.5% (4/15); 4.7% (6/23)    Endometriosis     Fibromyalgia     Dr. Garcia in past    GERD (gastroesophageal reflux disease)     Hypertension     Multiple thyroid nodules 07/2009    neg FNA Dr. Hilario now Dr Gutierrez    Nephrolithiasis 1980s    Osteopenia     DEXA t score -0.8 spine, -1.6 hip (10/07);  -0.4 spine, -1.1 hip w FRAX 5.2/0.5 (10/12); -1.1 spine, -1.0 hip (4/15); -0.3 spine, -1.5 hip (9/20); -0.3 spine,. -1.4 hip (10/22);  Dr Garcia neg w/u secondary causes    Overweight (BMI 25.0-29.9)     peak weight 149 lbs, bmi 29.1 rom 9/16; failed qsymia and belviq; 24h U sigifredo elev but LDST neg 9/16; intol glp; IF 6/22    PMR (polymyalgia rheumatica) ([facility])     Dr Garcia    PUD (peptic ulcer disease) 2018    Dr Petty on EGD; h pylori neg    RA (rheumatoid arthritis) ([facility]) 03/10/2019    Dr Garcia 2018; ccp+; failed hunmira, orencia; now enbrel    Rosacemoses     CAMPOS (stress urinary incontinence, female) 2017    Dr Woodruff 2022    Trochanteric bursitis     Vertical diplopia 01/2014    Vitamin D

## 2024-02-15 ENCOUNTER — PATIENT MESSAGE (OUTPATIENT)
Age: 64
End: 2024-02-15

## 2024-02-15 DIAGNOSIS — F51.04 CHRONIC INSOMNIA: ICD-10-CM

## 2024-02-15 NOTE — TELEPHONE ENCOUNTER
From: Maria Ines Das  To: Dr. Dandre Roman  Sent: 2/15/2024 1:54 PM EST  Subject: New pharmacy - [facility]     Please send RX for the following to [facility]  Zolipidem 5mg  Pantoprazole sodium 40mg  Benazepril   Vit D 50,000  Estradiol 1mg tab  Thanks

## 2024-02-15 NOTE — TELEPHONE ENCOUNTER
----- Message from Maria Ines Das sent at 2/15/2024  1:54 PM EST -----  Regarding: New pharmacy - [facility]   Contact: [phone]  Please send RX for the following to [facility]  Zolipidem 5mg  Pantoprazole sodium 40mg  Benazepril   Vit D 50,000  Estradiol 1mg tab  Thanks

## 2024-02-21 RX ORDER — BENAZEPRIL HYDROCHLORIDE 10 MG/1
TABLET ORAL
Qty: 90 TABLET | Refills: 3 | Status: SHIPPED | OUTPATIENT
Start: 2024-02-21

## 2024-02-21 RX ORDER — ESTRADIOL 1 MG/1
1 TABLET ORAL DAILY
Qty: 21 TABLET | Refills: 1 | Status: SHIPPED | OUTPATIENT
Start: 2024-02-21

## 2024-02-21 RX ORDER — BENAZEPRIL HYDROCHLORIDE 10 MG/1
TABLET ORAL
Qty: 90 TABLET | Refills: 3 | OUTPATIENT
Start: 2024-02-21

## 2024-02-21 RX ORDER — PANTOPRAZOLE SODIUM 40 MG/1
40 TABLET, DELAYED RELEASE ORAL DAILY
Qty: 90 TABLET | Refills: 3 | Status: SHIPPED | OUTPATIENT
Start: 2024-02-21

## 2024-02-21 RX ORDER — ZOLPIDEM TARTRATE 5 MG/1
TABLET ORAL
Qty: 90 TABLET | Refills: 1 | OUTPATIENT
Start: 2024-02-21

## 2024-02-21 RX ORDER — ERGOCALCIFEROL 1.25 MG/1
CAPSULE ORAL
Qty: 6 CAPSULE | Refills: 3 | OUTPATIENT
Start: 2024-02-21

## 2024-02-21 RX ORDER — ZOLPIDEM TARTRATE 5 MG/1
TABLET ORAL
Qty: 90 TABLET | Refills: 0 | Status: SHIPPED | OUTPATIENT
Start: 2024-02-21 | End: 2024-04-04

## 2024-02-21 RX ORDER — ERGOCALCIFEROL 1.25 MG/1
CAPSULE ORAL
Qty: 6 CAPSULE | Refills: 3 | Status: SHIPPED | OUTPATIENT
Start: 2024-02-21

## 2024-02-21 RX ORDER — ESTRADIOL 1 MG/1
1 TABLET ORAL DAILY
Qty: 90 TABLET | Refills: 3 | OUTPATIENT
Start: 2024-02-21

## 2024-02-21 RX ORDER — PANTOPRAZOLE SODIUM 40 MG/1
40 TABLET, DELAYED RELEASE ORAL DAILY
Qty: 90 TABLET | Refills: 3 | OUTPATIENT
Start: 2024-02-21

## 2024-02-26 DIAGNOSIS — F41.9 ANXIETY: ICD-10-CM

## 2024-02-26 NOTE — TELEPHONE ENCOUNTER
Last Office Visit: 01/16/24  Next Office Visit: 06/10/24  Last Refill: 01/16/24    Patient requesting medication sent to new pharmacy.

## 2024-02-28 RX ORDER — DULOXETIN HYDROCHLORIDE 20 MG/1
20 CAPSULE, DELAYED RELEASE ORAL DAILY
Qty: 90 CAPSULE | Refills: 3 | Status: SHIPPED | OUTPATIENT
Start: 2024-02-28

## 2024-05-04 DIAGNOSIS — F51.04 CHRONIC INSOMNIA: ICD-10-CM

## 2024-05-06 RX ORDER — ZOLPIDEM TARTRATE 5 MG/1
TABLET ORAL
Qty: 90 TABLET | Refills: 0 | Status: SHIPPED | OUTPATIENT
Start: 2024-05-06 | End: 2024-08-06

## 2024-06-03 ENCOUNTER — HOSPITAL ENCOUNTER (OUTPATIENT)
Facility: HOSPITAL | Age: 64
Setting detail: SPECIMEN
Discharge: HOME | End: 2024-06-06
Payer: MEDICARE

## 2024-06-03 DIAGNOSIS — F51.04 CHRONIC INSOMNIA: ICD-10-CM

## 2024-06-03 DIAGNOSIS — E78.5 DYSLIPIDEMIA: ICD-10-CM

## 2024-06-03 LAB
ALBUMIN SERPL-MCNC: 3.5 G/DL (ref 3.4–5)
ALBUMIN/GLOB SERPL: 1.1 (ref 0.8–1.7)
ALP SERPL-CCNC: 52 U/L (ref 45–117)
ALT SERPL-CCNC: 26 U/L (ref 13–56)
AMPHET UR QL SCN: NEGATIVE
ANION GAP SERPL CALC-SCNC: 7 MMOL/L (ref 3–18)
AST SERPL-CCNC: 21 U/L (ref 10–38)
BARBITURATES UR QL SCN: NEGATIVE
BASOPHILS # BLD: 0.1 K/UL (ref 0–0.1)
BASOPHILS NFR BLD: 1 % (ref 0–2)
BENZODIAZ UR QL: NEGATIVE
BILIRUB SERPL-MCNC: 0.4 MG/DL (ref 0.2–1)
BUN SERPL-MCNC: 19 MG/DL (ref 7–18)
BUN/CREAT SERPL: 18 (ref 12–20)
CALCIUM SERPL-MCNC: 8.9 MG/DL (ref 8.5–10.1)
CANNABINOIDS UR QL SCN: NEGATIVE
CHLORIDE SERPL-SCNC: 104 MMOL/L (ref 100–111)
CHOLEST SERPL-MCNC: 213 MG/DL
CO2 SERPL-SCNC: 28 MMOL/L (ref 21–32)
COCAINE UR QL SCN: NEGATIVE
CREAT SERPL-MCNC: 1.04 MG/DL (ref 0.6–1.3)
DIFFERENTIAL METHOD BLD: ABNORMAL
EOSINOPHIL # BLD: 0.2 K/UL (ref 0–0.4)
EOSINOPHIL NFR BLD: 2 % (ref 0–5)
ERYTHROCYTE [DISTWIDTH] IN BLOOD BY AUTOMATED COUNT: 14.1 % (ref 11.6–14.5)
GLOBULIN SER CALC-MCNC: 3.3 G/DL (ref 2–4)
GLUCOSE SERPL-MCNC: 82 MG/DL (ref 74–99)
HCT VFR BLD AUTO: 39.9 % (ref 35–45)
HDLC SERPL-MCNC: 77 MG/DL (ref 40–60)
HDLC SERPL: 2.8 (ref 0–5)
HGB BLD-MCNC: 12.9 G/DL (ref 12–16)
IMM GRANULOCYTES # BLD AUTO: 0 K/UL (ref 0–0.04)
IMM GRANULOCYTES NFR BLD AUTO: 0 % (ref 0–0.5)
LDLC SERPL CALC-MCNC: 102.8 MG/DL (ref 0–100)
LIPID PANEL: ABNORMAL
LYMPHOCYTES # BLD: 2.6 K/UL (ref 0.9–3.6)
LYMPHOCYTES NFR BLD: 24 % (ref 21–52)
Lab: NORMAL
MCH RBC QN AUTO: 32.2 PG (ref 24–34)
MCHC RBC AUTO-ENTMCNC: 32.3 G/DL (ref 31–37)
MCV RBC AUTO: 99.5 FL (ref 78–100)
METHADONE UR QL: NEGATIVE
MONOCYTES # BLD: 1.1 K/UL (ref 0.05–1.2)
MONOCYTES NFR BLD: 10 % (ref 3–10)
NEUTS SEG # BLD: 6.8 K/UL (ref 1.8–8)
NEUTS SEG NFR BLD: 63 % (ref 40–73)
NRBC # BLD: 0 K/UL (ref 0–0.01)
NRBC BLD-RTO: 0 PER 100 WBC
OPIATES UR QL: NEGATIVE
PCP UR QL: NEGATIVE
PLATELET # BLD AUTO: 333 K/UL (ref 135–420)
PMV BLD AUTO: 10.1 FL (ref 9.2–11.8)
POTASSIUM SERPL-SCNC: 4.3 MMOL/L (ref 3.5–5.5)
PROT SERPL-MCNC: 6.8 G/DL (ref 6.4–8.2)
RBC # BLD AUTO: 4.01 M/UL (ref 4.2–5.3)
SODIUM SERPL-SCNC: 139 MMOL/L (ref 136–145)
TRIGL SERPL-MCNC: 166 MG/DL
VLDLC SERPL CALC-MCNC: 33.2 MG/DL
WBC # BLD AUTO: 10.9 K/UL (ref 4.6–13.2)

## 2024-06-03 PROCEDURE — 80061 LIPID PANEL: CPT

## 2024-06-03 PROCEDURE — 36415 COLL VENOUS BLD VENIPUNCTURE: CPT

## 2024-06-03 PROCEDURE — 85025 COMPLETE CBC W/AUTO DIFF WBC: CPT

## 2024-06-03 PROCEDURE — 80307 DRUG TEST PRSMV CHEM ANLYZR: CPT

## 2024-06-03 PROCEDURE — 80053 COMPREHEN METABOLIC PANEL: CPT

## 2024-06-29 DIAGNOSIS — E78.5 DYSLIPIDEMIA: ICD-10-CM

## 2024-07-02 RX ORDER — PRAVASTATIN SODIUM 10 MG
10 TABLET ORAL DAILY
Qty: 90 TABLET | Refills: 3 | Status: SHIPPED | OUTPATIENT
Start: 2024-07-02

## 2024-07-03 ENCOUNTER — HOSPITAL ENCOUNTER (OUTPATIENT)
Facility: HOSPITAL | Age: 64
Discharge: HOME | End: 2024-07-06
Payer: MEDICARE

## 2024-07-03 VITALS — HEIGHT: 61 IN | BODY MASS INDEX: 27.41 KG/M2

## 2024-07-03 DIAGNOSIS — Z12.39 BREAST SCREENING: ICD-10-CM

## 2024-07-03 PROCEDURE — 77063 BREAST TOMOSYNTHESIS BI: CPT

## 2024-07-03 RX ORDER — ESCITALOPRAM OXALATE 20 MG/1
20 TABLET ORAL DAILY
Qty: 90 TABLET | Refills: 3 | Status: SHIPPED | OUTPATIENT
Start: 2024-07-03

## 2024-07-05 ENCOUNTER — TELEPHONE (OUTPATIENT)
Facility: CLINIC | Age: 64
End: 2024-07-05

## 2024-07-05 NOTE — TELEPHONE ENCOUNTER
Order# [id number]  [facility] called in states there is a drug interaction  warning for Paxel and Plaquenil and  are also concerned about the dosage prescribed for escitalopram (LEXAPRO) 20 MG tablet  and state Pts over 65 they do not recommend to fill no more than 10MG tablets.     Please advise if provider still wants to fill the Rx for escitalopram (LEXAPRO) 20 MG tablet

## 2024-08-16 ENCOUNTER — PATIENT MESSAGE (OUTPATIENT)
Facility: CLINIC | Age: 64
End: 2024-08-16

## 2024-08-16 DIAGNOSIS — H93.19 TINNITUS, UNSPECIFIED LATERALITY: ICD-10-CM

## 2024-08-16 DIAGNOSIS — H92.09 OTALGIA, UNSPECIFIED LATERALITY: ICD-10-CM

## 2024-08-16 DIAGNOSIS — R42 DIZZINESS: Primary | ICD-10-CM

## 2024-08-19 RX ORDER — DIAZEPAM 5 MG/1
5 TABLET ORAL EVERY 8 HOURS PRN
Qty: 60 TABLET | Refills: 0 | Status: SHIPPED | OUTPATIENT
Start: 2024-08-19 | End: 2024-09-08

## 2024-08-19 NOTE — TELEPHONE ENCOUNTER
Will need to see ENT - she has seen Dr Mast in past  Would try valium in place of the meclizine as well       Diagnosis Orders   1. Dizziness  External Referral To ENT    diazePAM (VALIUM) 5 MG tablet      2. Tinnitus, unspecified laterality  External Referral To ENT      3. Otalgia, unspecified laterality  External Referral To ENT

## 2024-08-21 DIAGNOSIS — F51.04 CHRONIC INSOMNIA: ICD-10-CM

## 2024-08-21 RX ORDER — ZOLPIDEM TARTRATE 5 MG/1
TABLET ORAL
Qty: 90 TABLET | Refills: 0 | Status: SHIPPED | OUTPATIENT
Start: 2024-08-21 | End: 2024-11-21

## 2024-08-27 ENCOUNTER — PATIENT MESSAGE (OUTPATIENT)
Facility: CLINIC | Age: 64
End: 2024-08-27

## 2024-08-27 DIAGNOSIS — L71.9 ROSACEA: Primary | ICD-10-CM

## 2024-08-27 RX ORDER — DOXYCYCLINE 50 MG/1
50 TABLET ORAL 2 TIMES DAILY
Qty: 30 TABLET | Refills: 5 | Status: SHIPPED | OUTPATIENT
Start: 2024-08-27

## 2024-10-03 ENCOUNTER — HOSPITAL ENCOUNTER (OUTPATIENT)
Facility: HOSPITAL | Age: 64
Setting detail: SPECIMEN
Discharge: HOME | End: 2024-10-06
Payer: MEDICARE

## 2024-10-03 DIAGNOSIS — Z00.00 PHYSICAL EXAM: ICD-10-CM

## 2024-10-03 LAB
ALBUMIN SERPL-MCNC: 3.5 G/DL (ref 3.4–5)
ALBUMIN/GLOB SERPL: 1.3 (ref 0.8–1.7)
ALP SERPL-CCNC: 43 U/L (ref 45–117)
ALT SERPL-CCNC: 30 U/L (ref 13–56)
ANION GAP SERPL CALC-SCNC: 4 MMOL/L (ref 3–18)
AST SERPL-CCNC: 27 U/L (ref 10–38)
BASOPHILS # BLD: 0.1 K/UL (ref 0–0.1)
BASOPHILS NFR BLD: 2 % (ref 0–2)
BILIRUB SERPL-MCNC: 0.4 MG/DL (ref 0.2–1)
BUN SERPL-MCNC: 15 MG/DL (ref 7–18)
BUN/CREAT SERPL: 16 (ref 12–20)
CALCIUM SERPL-MCNC: 8.7 MG/DL (ref 8.5–10.1)
CHLORIDE SERPL-SCNC: 106 MMOL/L (ref 100–111)
CHOLEST SERPL-MCNC: 175 MG/DL
CO2 SERPL-SCNC: 28 MMOL/L (ref 21–32)
CREAT SERPL-MCNC: 0.91 MG/DL (ref 0.6–1.3)
DIFFERENTIAL METHOD BLD: ABNORMAL
EOSINOPHIL # BLD: 0.2 K/UL (ref 0–0.4)
EOSINOPHIL NFR BLD: 4 % (ref 0–5)
ERYTHROCYTE [DISTWIDTH] IN BLOOD BY AUTOMATED COUNT: 14 % (ref 11.6–14.5)
GLOBULIN SER CALC-MCNC: 2.8 G/DL (ref 2–4)
GLUCOSE SERPL-MCNC: 83 MG/DL (ref 74–99)
HBA1C MFR BLD: 5.2 % (ref 4.2–5.6)
HCT VFR BLD AUTO: 42.5 % (ref 35–45)
HDLC SERPL-MCNC: 89 MG/DL (ref 40–60)
HDLC SERPL: 2 (ref 0–5)
HGB BLD-MCNC: 13.6 G/DL (ref 12–16)
IMM GRANULOCYTES # BLD AUTO: 0 K/UL (ref 0–0.04)
IMM GRANULOCYTES NFR BLD AUTO: 0 % (ref 0–0.5)
LDLC SERPL CALC-MCNC: 59 MG/DL (ref 0–100)
LIPID PANEL: ABNORMAL
LYMPHOCYTES # BLD: 1.8 K/UL (ref 0.9–3.6)
LYMPHOCYTES NFR BLD: 29 % (ref 21–52)
MCH RBC QN AUTO: 31.8 PG (ref 24–34)
MCHC RBC AUTO-ENTMCNC: 32 G/DL (ref 31–37)
MCV RBC AUTO: 99.3 FL (ref 78–100)
MONOCYTES # BLD: 0.7 K/UL (ref 0.05–1.2)
MONOCYTES NFR BLD: 11 % (ref 3–10)
NEUTS SEG # BLD: 3.5 K/UL (ref 1.8–8)
NEUTS SEG NFR BLD: 55 % (ref 40–73)
NRBC # BLD: 0 K/UL (ref 0–0.01)
NRBC BLD-RTO: 0 PER 100 WBC
PLATELET # BLD AUTO: 309 K/UL (ref 135–420)
PMV BLD AUTO: 10.2 FL (ref 9.2–11.8)
POTASSIUM SERPL-SCNC: 4.3 MMOL/L (ref 3.5–5.5)
PROT SERPL-MCNC: 6.3 G/DL (ref 6.4–8.2)
RBC # BLD AUTO: 4.28 M/UL (ref 4.2–5.3)
SODIUM SERPL-SCNC: 138 MMOL/L (ref 136–145)
T4 FREE SERPL-MCNC: 0.9 NG/DL (ref 0.7–1.5)
TRIGL SERPL-MCNC: 135 MG/DL
TSH SERPL DL<=0.05 MIU/L-ACNC: 2.2 UIU/ML (ref 0.36–3.74)
VLDLC SERPL CALC-MCNC: 27 MG/DL
WBC # BLD AUTO: 6.3 K/UL (ref 4.6–13.2)

## 2024-10-03 PROCEDURE — 83036 HEMOGLOBIN GLYCOSYLATED A1C: CPT

## 2024-10-03 PROCEDURE — 84443 ASSAY THYROID STIM HORMONE: CPT

## 2024-10-03 PROCEDURE — 36415 COLL VENOUS BLD VENIPUNCTURE: CPT

## 2024-10-03 PROCEDURE — 80053 COMPREHEN METABOLIC PANEL: CPT

## 2024-10-03 PROCEDURE — 85025 COMPLETE CBC W/AUTO DIFF WBC: CPT

## 2024-10-03 PROCEDURE — 80061 LIPID PANEL: CPT

## 2024-10-03 PROCEDURE — 84439 ASSAY OF FREE THYROXINE: CPT

## 2024-10-10 ENCOUNTER — OFFICE VISIT (OUTPATIENT)
Facility: CLINIC | Age: 64
End: 2024-10-10

## 2024-10-10 VITALS
TEMPERATURE: 97.5 F | DIASTOLIC BLOOD PRESSURE: 59 MMHG | HEIGHT: 61 IN | HEART RATE: 76 BPM | SYSTOLIC BLOOD PRESSURE: 116 MMHG | WEIGHT: 151 LBS | OXYGEN SATURATION: 98 % | BODY MASS INDEX: 28.51 KG/M2 | RESPIRATION RATE: 16 BRPM

## 2024-10-10 DIAGNOSIS — R42 DIZZINESS: Primary | ICD-10-CM

## 2024-10-10 DIAGNOSIS — E66.3 OVERWEIGHT (BMI 25.0-29.9): ICD-10-CM

## 2024-10-10 DIAGNOSIS — I10 ESSENTIAL HYPERTENSION: ICD-10-CM

## 2024-10-10 DIAGNOSIS — E78.5 DYSLIPIDEMIA: ICD-10-CM

## 2024-10-10 DIAGNOSIS — H81.09 MENIERE'S DISEASE, UNSPECIFIED LATERALITY: ICD-10-CM

## 2024-10-10 RX ORDER — DIAZEPAM 10 MG
10 TABLET ORAL EVERY 12 HOURS PRN
Qty: 20 TABLET | Refills: 0 | Status: SHIPPED | OUTPATIENT
Start: 2024-10-10 | End: 2024-10-20

## 2024-10-10 NOTE — PROGRESS NOTES
Maria Ines Das presents today for   Chief Complaint   Patient presents with    4 Month Follow-Up    Hypertension       \"Have you been to the ER, urgent care clinic since your last visit?  Hospitalized since your last visit?\"    NO    “Have you seen or consulted any other health care providers outside of [facility] since your last visit?”    NO             
13.4, plt 323  From 9/15 showed               wbc 6.2, hb 13.8, plt 302, lip 201,   ck/trop-  From 4/16 showed   gluc 90, cr 0.93, gfr 68 alt 23,          chol 204, tg 187, hdl 54, ldl-c 113, wbc 6.7, hb 14.4, plt 347, tsh 2.08, vit d 40.9, ft4 0.85, 24 hr U ca 123, 24h U sigifredo 53, spep neg  From 5/16 showed                                   pth 29, tTgAb neg  From 9/16 showed                                   LDST neg, ua neg pro  From 11/17 showed gluc 92, cr 0.88, gfr 73, alt 25,          chol 200, tg 174, hdl 61, ldl-c 104, wbc 6.5, hb 13.3, plt 305, hep c-  ,  vit d 81.3  From 12/18 showed gluc 94, cr 0.89, gfr 71, alt 25,                        vit d 52.2  From 5/19 showed                            esr 14, RA neg, ccp 170  From 6/20 showed   gluc 74, cr 1.00, gfr 56,  alt 32,         chol 204, tg 201, hdl 75, ldl-c 88,   wbc 8.5, hb 13.4, plt 326  From 6/22 showed   gluc 74, cr 0.92, gfr>60, alt 28,         chol 170, tg 175, hdl 76, ldl-c 59,   wbc 7.9, hb 13.3, plt 260, tsh 2.27,   From 6/23 showed   gluc 82, cr 1.05, gfr 59,  alt 29, hba1c 5.1, chol 213, tg 147, hdl 91, ldl-c 93,   wbc 6.6, hb 13.8, plt 300, tsh 2.33, ft4 1.00, ua neg  From 6/24 showed   gluc 82, cr 1.04, gfr 60,  alt 26,         chol 213, tg 166, hdl 77, ldl-c 103, wb 10.9, hb 12.9, plt 333    Results for orders placed or performed during the hospital encounter of 10/03/24 (from the past 2160 hour(s))   CBC with Auto Differential   Result Value Ref Range    WBC 6.3 4.6 - 13.2 K/uL    RBC 4.28 4.20 - 5.30 M/uL    Hemoglobin 13.6 12.0 - 16.0 g/dL    Hematocrit 42.5 35.0 - 45.0 %    MCV 99.3 78.0 - 100.0 FL    MCH 31.8 24.0 - 34.0 PG    MCHC 32.0 31.0 - 37.0 g/dL    RDW 14.0 11.6 - 14.5 %    Platelets 309 135 - 420 K/uL    MPV 10.2 9.2 - 11.8 FL    Nucleated RBCs 0.0 0  WBC    nRBC 0.00 0.00 - 0.01 K/uL    Neutrophils % 55 40 - 73 %    Lymphocytes % 29 21 - 52 %    Monocytes % 11 (H) 3 - 10 %    Eosinophils % 4 0 - 5 %    Basophils % 2

## 2024-10-30 ENCOUNTER — PATIENT MESSAGE (OUTPATIENT)
Facility: CLINIC | Age: 64
End: 2024-10-30

## 2024-10-30 RX ORDER — ESTRADIOL 1 MG/1
1 TABLET ORAL DAILY
Qty: 90 TABLET | Refills: 3 | Status: SHIPPED | OUTPATIENT
Start: 2024-10-30

## 2025-01-10 RX ORDER — ESCITALOPRAM OXALATE 20 MG/1
20 TABLET ORAL DAILY
Qty: 90 TABLET | Refills: 3 | Status: SHIPPED | OUTPATIENT
Start: 2025-01-10

## 2025-01-22 DIAGNOSIS — E78.5 DYSLIPIDEMIA: ICD-10-CM

## 2025-01-24 ENCOUNTER — PATIENT MESSAGE (OUTPATIENT)
Facility: CLINIC | Age: 65
End: 2025-01-24

## 2025-01-24 DIAGNOSIS — F51.04 CHRONIC INSOMNIA: Primary | ICD-10-CM

## 2025-01-24 RX ORDER — ERGOCALCIFEROL 1.25 MG/1
CAPSULE, LIQUID FILLED ORAL
Qty: 6 CAPSULE | Refills: 3 | Status: SHIPPED | OUTPATIENT
Start: 2025-01-24

## 2025-01-24 RX ORDER — BENAZEPRIL HYDROCHLORIDE 10 MG/1
TABLET ORAL
Qty: 90 TABLET | Refills: 3 | Status: SHIPPED | OUTPATIENT
Start: 2025-01-24

## 2025-01-24 RX ORDER — ESTRADIOL 1 MG/1
1 TABLET ORAL DAILY
Qty: 90 TABLET | Refills: 3 | Status: SHIPPED | OUTPATIENT
Start: 2025-01-24

## 2025-01-24 RX ORDER — PANTOPRAZOLE SODIUM 40 MG/1
40 TABLET, DELAYED RELEASE ORAL DAILY
Qty: 90 TABLET | Refills: 3 | Status: SHIPPED | OUTPATIENT
Start: 2025-01-24

## 2025-01-24 RX ORDER — PRAVASTATIN SODIUM 10 MG
10 TABLET ORAL DAILY
Qty: 90 TABLET | Refills: 3 | Status: SHIPPED | OUTPATIENT
Start: 2025-01-24

## 2025-01-27 RX ORDER — ZOLPIDEM TARTRATE 5 MG/1
5 TABLET ORAL NIGHTLY PRN
Qty: 90 TABLET | Refills: 0 | Status: SHIPPED | OUTPATIENT
Start: 2025-01-27 | End: 2025-04-27

## 2025-01-27 NOTE — TELEPHONE ENCOUNTER
[facility]    The last fill date for Zolpidem was 08/22/2024 for a 90 d/s with qty 90    Last UDS: 06/03/2024   CSA Last signed: 06/11/2024     PCP: Dandre Roman MD    Last appointment: 10/10/2024  Future Appointments   Date Time Provider Department Center   4/7/2025  8:45 AM [facility] LAB VISIT [facility] DEP   4/14/2025 11:00 AM Dandre Roman MD [facility] DEP       Requested Prescriptions     Pending Prescriptions Disp Refills    zolpidem (AMBIEN) 5 MG tablet 90 tablet 0     Sig: Take 1 tablet by mouth nightly as needed for Sleep. Max Daily Amount: 5 mg

## 2025-02-11 ENCOUNTER — TELEMEDICINE (OUTPATIENT)
Facility: CLINIC | Age: 65
End: 2025-02-11

## 2025-02-11 ENCOUNTER — TELEPHONE (OUTPATIENT)
Facility: CLINIC | Age: 65
End: 2025-02-11

## 2025-02-11 DIAGNOSIS — H65.92 LEFT NON-SUPPURATIVE OTITIS MEDIA: Primary | ICD-10-CM

## 2025-02-11 RX ORDER — AZITHROMYCIN 250 MG/1
TABLET, FILM COATED ORAL
Qty: 6 TABLET | Refills: 0 | Status: SHIPPED | OUTPATIENT
Start: 2025-02-11 | End: 2025-02-21

## 2025-02-11 NOTE — PROGRESS NOTES
Maria Ines WILLIAM Das 65 y.o. who was seen by synchronous (real-time) audio-video technology for   Chief Complaint   Patient presents with    Ear Pain                    Assessment & Plan:      Diagnosis Orders   1. Left non-suppurative otitis media  azithromycin (ZITHROMAX) 250 MG tablet          712  Subjective:   They were on vacation in [place] last week.  She started having sinus symptoms as they were leaving.  Over the last few days, its gotten worse, with congestion in the sinuses, discolored drainage, no sore throat or fever however.  Denied any difficulty swallowing or cough.  She does report increasingly muffled hearing, crackly sensation, increasing ear pain on the left side.  She has tried OTC meds without much relief    Objective:     Past Medical History:   Diagnosis Date    Allergic rhinitis     Dr Mast    Anxiety 04/2014    ANNELISE-7 was 19/21    Chronic insomnia 06/17/2019    tried benadryl, trazodone; intol belsomra, doxepin; using ambien    Depression     has tried paxil, zoloft, wellbutrin, cymbalta, trazodone    Dyslipidemia     calculated 10 year risk score was 2.5% (4/15); 4.7% (6/23)    Endometriosis     Fibromyalgia     Dr. Garcia in past    GERD (gastroesophageal reflux disease)     Hypertension     Meniere's disease 2024    Dr Mast    Multiple thyroid nodules 07/2009    neg FNA Dr. Hilario now Dr Gutierrez    Nephrolithiasis 1980s    Osteopenia     DEXA t score -0.8 spine, -1.6 hip (10/07);  -0.4 spine, -1.1 hip w FRAX 5.2/0.5 (10/12); -1.1 spine, -1.0 hip (4/15); -0.3 spine, -1.5 hip (9/20); -0.3 spine,. -1.4 hip (10/22);  -0.5 spine, -1.7 hip (11/24) Dr Garcia neg w/u secondary causes    Overweight (BMI 25.0-29.9)     peak weight 149 lbs, bmi 29.1 rom 9/16; failed qsymia and belviq; 24h U sigifredo elev but LDST neg 9/16; intol glp; IF 6/22    PMR (polymyalgia rheumatica) ([facility])     Dr Garcia    PUD (peptic ulcer disease) 2018    Dr Petty on EGD; h pylori neg    RA (rheumatoid arthritis) ([facility])

## 2025-02-11 NOTE — TELEPHONE ENCOUNTER
Pt called in had a cold last week tested for covid and flu negative for both. Pt states she flew in a plane last week. Pt states left ear is very painful and is experiencing hearing loss and wants to know of she needs to be seen.     Please advise call back req

## 2025-03-14 ENCOUNTER — HOSPITAL ENCOUNTER (OUTPATIENT)
Facility: HOSPITAL | Age: 65
Setting detail: RECURRING SERIES
Discharge: HOME | End: 2025-03-17
Payer: MEDICARE

## 2025-03-14 PROCEDURE — 97535 SELF CARE MNGMENT TRAINING: CPT

## 2025-03-14 PROCEDURE — 97161 PT EVAL LOW COMPLEX 20 MIN: CPT

## 2025-03-14 PROCEDURE — 97110 THERAPEUTIC EXERCISES: CPT

## 2025-03-14 NOTE — THERAPY EVALUATION
To be accomplished in 8 WEEKS  1 patient will have established and be I with HEP to aid with independence and self management at discharge  EVAL HEP issued at evaluation  2 patient will have LEFS 67 to aid with increase tolerance to ADLS and regular daily activities at home and in the community  EVAL 58  3 patient will have overall 50% reported improvement to aid with increase tolerance to her walking program  EVAL pain worse with walking  4 patient will have MMT B hip F and abd 5 to aid with increase tolerance to sit to stand transfers  EVAL  hip F  L 4, R 4+   hip abd L 4  R 4+        Frequency / Duration: Patient to be seen 2 times per week for 8 WEEKS    Patient/ Caregiver education and instruction: Diagnosis, prognosis, self care, activity modification, and exercises [x]  Plan of care has been reviewed with PTA    Certification Period: 3/14/25-5/14/25    Hallie Joseph, PT       3/14/2025       2:38 PM    Payor: MEDICARE / Plan: MEDICARE PART A AND B / Product Type: *No Product type* /     No Physician signature required; Signature on POC no longer required for Medicare as of 1/1/25  
tissue restrictions, analyze and cue for proper movement patterns, analyze and modify for postural abnormalities, and instruct in home and community integration to address functional deficits and attain remaining goals.       [x]  See Plan of Care for goals and assessment      PLAN  - Continue Plan of Care  - Upgrade activities as tolerated    Hallie Joseph, PT 3/14/2025  2:37 PM  If an interpreting service was utilized for treatment of this patient, the contents of this document represent the material reviewed with the patient via the .

## 2025-03-18 ENCOUNTER — HOSPITAL ENCOUNTER (OUTPATIENT)
Facility: HOSPITAL | Age: 65
Setting detail: RECURRING SERIES
Discharge: HOME | End: 2025-03-21
Payer: MEDICARE

## 2025-03-18 PROCEDURE — 97530 THERAPEUTIC ACTIVITIES: CPT

## 2025-03-18 PROCEDURE — 97110 THERAPEUTIC EXERCISES: CPT

## 2025-03-18 PROCEDURE — 97140 MANUAL THERAPY 1/> REGIONS: CPT

## 2025-03-18 NOTE — PROGRESS NOTES
today. 3/18/2025     Long Term Goals: To be accomplished in 8 WEEKS  1 patient will have established and be I with HEP to aid with independence and self management at discharge  EVAL HEP issued at evaluation  Current: Patient reports compliance with initial HEP. 3/18/2025     2 patient will have LEFS 67 to aid with increase tolerance to ADLS and regular daily activities at home and in the community  EVAL 58  Current: Ongoing, will assess at next PN. 3/18/2025    3 patient will have overall 50% reported improvement to aid with increase tolerance to her walking program  EVAL pain worse with walking  Current: Ongoing, will assess at next PN. 3/18/2025    4 patient will have MMT B hip F and abd 5 to aid with increase tolerance to sit to stand transfers  EVAL  hip F  L 4, R 4+   hip abd L 4  R 4+  Current: Ongoing, will assess at next PN. 3/18/2025       Next PN/ RC due 4/14/2025  Auth due (visit number/ date) MARTY[facility] 12/31/2025    PLAN  - Continue Plan of Care  - Upgrade activities as tolerated    Ivon Bolton PTA    3/18/2025    7:37 AM  If an interpreting service was utilized for treatment of this patient, the contents of this document represent the material reviewed with the patient via the .     Future Appointments   Date Time Provider Department Center   3/18/2025  9:30 AM Ivon Bolton PTA [facility]   3/20/2025  9:30 AM Maggie Noe PTA [facility]   3/25/2025 10:10 AM Ivon Bolton PTA [facility]   3/27/2025 10:10 AM Ivon Bolton PTA [facility]   4/1/2025 10:10 AM Ivon Bolton PTA [facility]   4/3/2025  9:30 AM Ivon Bolton PTA [facility]   4/7/2025  8:45 AM [facility] LAB VISIT [facility] DEP   4/8/2025  9:30 AM Hallie Joseph, PT [facility]   4/10/2025  9:30 AM Hallie Joseph, PT [facility]   4/14/2025 11:00 AM Dandre Roman MD [facility] DEP   4/15/2025  9:30 AM Hallie Joseph, PT [facility]   4/17/2025  9:30 AM Ivon Bolton, PTA [facility]

## 2025-03-20 ENCOUNTER — APPOINTMENT (OUTPATIENT)
Facility: HOSPITAL | Age: 65
End: 2025-03-20
Payer: MEDICARE

## 2025-03-20 ENCOUNTER — TELEPHONE (OUTPATIENT)
Facility: HOSPITAL | Age: 65
End: 2025-03-20

## 2025-03-20 NOTE — TELEPHONE ENCOUNTER
Patient cancelled appt. on 3/20/25 at 7:47am due to the patient not feeling well and she is not able to make her appt. today.

## 2025-03-25 ENCOUNTER — HOSPITAL ENCOUNTER (OUTPATIENT)
Facility: HOSPITAL | Age: 65
Setting detail: RECURRING SERIES
Discharge: HOME | End: 2025-03-28
Payer: MEDICARE

## 2025-03-25 PROCEDURE — 97530 THERAPEUTIC ACTIVITIES: CPT

## 2025-03-25 PROCEDURE — 97140 MANUAL THERAPY 1/> REGIONS: CPT

## 2025-03-25 PROCEDURE — 97110 THERAPEUTIC EXERCISES: CPT

## 2025-03-25 NOTE — PROGRESS NOTES
PHYSICAL / OCCUPATIONAL THERAPY - DAILY TREATMENT NOTE    Patient Name: Maria Ines Das    Date: 3/25/2025    : 1960  Insurance: Payor: MEDICARE / Plan: MEDICARE PART A AND B / Product Type: *No Product type* /      Patient  verified Yes     Visit #   Current / Total 3 16   Time   In / Out 1010 am  1102 am    Pain   In / Out 0/10 0/10    Subjective Functional Status/Changes: Patient reports increased hip pain after last session. Patient reports that she was able to return back to walking after a couple days and was able to go on a road trip to [place] with no reports of pain.      TREATMENT AREA =  Trochanteric bursitis of both hips  Pain in right hip  Pain in left hip    OBJECTIVE    Modalities Rationale:     decrease pain and increase tissue extensibility to improve patient's ability to progress to PLOF and address remaining functional goals.     min [] Estim Unattended, type/location:                                      []  w/ice    []  w/heat    min [] Estim Attended, type/location:                                     []  w/US     []  w/ice    []  w/heat    []  TENS insruct      min []  Mechanical Traction: type/lbs                   []  pro   []  sup   []  int   []  cont    []  before manual    []  after manual    min []  Ultrasound, settings/location:      min  unbill []  Ice     []  Heat    location/position:     min []  Paraffin,  details:     min []  Vasopneumatic Device, press/temp:     min []  Whirlpool / Fluido:    If using vaso (only need to measure limb vaso being performed on)      pre-treatment girth :       post-treatment girth :       measured at (landmark location) :      min []  Other:    Skin assessment post-treatment:   Intact      Therapeutic Procedures:    Tx Min Billable or 1:1 Min (if diff from Tx Min) Procedure, Rationale, Specifics   12   [id number] Therapeutic Activity (timed):  use of dynamic activities replicating functional movements to increase ROM, strength,

## 2025-03-27 ENCOUNTER — HOSPITAL ENCOUNTER (OUTPATIENT)
Facility: HOSPITAL | Age: 65
Setting detail: RECURRING SERIES
Discharge: HOME | End: 2025-03-30
Payer: MEDICARE

## 2025-03-27 PROCEDURE — 97530 THERAPEUTIC ACTIVITIES: CPT

## 2025-03-27 PROCEDURE — 97140 MANUAL THERAPY 1/> REGIONS: CPT

## 2025-03-27 PROCEDURE — 97110 THERAPEUTIC EXERCISES: CPT

## 2025-03-27 NOTE — PROGRESS NOTES
PHYSICAL / OCCUPATIONAL THERAPY - DAILY TREATMENT NOTE    Patient Name: Maria Ines Das    Date: 3/27/2025    : 1960  Insurance: Payor: MEDICARE / Plan: MEDICARE PART A AND B / Product Type: *No Product type* /      Patient  verified Yes     Visit #   Current / Total 4 16   Time   In / Out 1007 am  1103 am    Pain   In / Out 2/10  010    Subjective Functional Status/Changes: Patient reports that he left thigh is aching today.     TREATMENT AREA =  Trochanteric bursitis of both hips  Pain in right hip  Pain in left hip    OBJECTIVE    Modalities Rationale:     decrease pain and increase tissue extensibility to improve patient's ability to progress to PLOF and address remaining functional goals.     min [] Estim Unattended, type/location:                                      []  w/ice    []  w/heat    min [] Estim Attended, type/location:                                     []  w/US     []  w/ice    []  w/heat    []  TENS insruct      min []  Mechanical Traction: type/lbs                   []  pro   []  sup   []  int   []  cont    []  before manual    []  after manual    min []  Ultrasound, settings/location:     10  min  unbill []  Ice     [x]  Heat    location/position: Prone to L/S    min []  Paraffin,  details:     min []  Vasopneumatic Device, press/temp:     min []  Whirlpool / Fluido:    If using vaso (only need to measure limb vaso being performed on)      pre-treatment girth :       post-treatment girth :       measured at (landmark location) :      min []  Other:    Skin assessment post-treatment:   Intact      Therapeutic Procedures:    Tx Min Billable or 1:1 Min (if diff from Tx Min) Procedure, Rationale, Specifics   16   [id number] Therapeutic Activity (timed):  use of dynamic activities replicating functional movements to increase ROM, strength, coordination, balance, and proprioception in order to improve patient's ability to progress to PLOF and address remaining functional goals.  (see flow

## 2025-04-01 ENCOUNTER — HOSPITAL ENCOUNTER (OUTPATIENT)
Facility: HOSPITAL | Age: 65
Setting detail: RECURRING SERIES
Discharge: HOME | End: 2025-04-04
Payer: MEDICARE

## 2025-04-01 PROCEDURE — 97110 THERAPEUTIC EXERCISES: CPT

## 2025-04-01 PROCEDURE — 97530 THERAPEUTIC ACTIVITIES: CPT

## 2025-04-01 PROCEDURE — 97140 MANUAL THERAPY 1/> REGIONS: CPT

## 2025-04-01 NOTE — PROGRESS NOTES
PHYSICAL / OCCUPATIONAL THERAPY - DAILY TREATMENT NOTE    Patient Name: Maria Ines Das    Date: 2025    : 1960  Insurance: Payor: MEDICARE / Plan: MEDICARE PART A AND B / Product Type: *No Product type* /      Patient  verified Yes     Visit #   Current / Total 5 16   Time   In / Out 1010 am 1105 am    Pain   In / Out 2/10  010    Subjective Functional Status/Changes: Patient reports that PT is helping overall. Patient states having some tightness and soreness around both hips today.      TREATMENT AREA =  Trochanteric bursitis of both hips  Pain in right hip  Pain in left hip    OBJECTIVE    Modalities Rationale:     decrease pain and increase tissue extensibility to improve patient's ability to progress to PLOF and address remaining functional goals.     min [] Estim Unattended, type/location:                                      []  w/ice    []  w/heat    min [] Estim Attended, type/location:                                     []  w/US     []  w/ice    []  w/heat    []  TENS insruct      min []  Mechanical Traction: type/lbs                   []  pro   []  sup   []  int   []  cont    []  before manual    []  after manual    min []  Ultrasound, settings/location:     10  min  unbill []  Ice     [x]  Heat    location/position: Prone to L/S    min []  Paraffin,  details:     min []  Vasopneumatic Device, press/temp:     min []  Whirlpool / Fluido:    If using vaso (only need to measure limb vaso being performed on)      pre-treatment girth :       post-treatment girth :       measured at (landmark location) :      min []  Other:    Skin assessment post-treatment:   Intact      Therapeutic Procedures:    Tx Min Billable or 1:1 Min (if diff from Tx Min) Procedure, Rationale, Specifics   15   25838 Therapeutic Activity (timed):  use of dynamic activities replicating functional movements to increase ROM, strength, coordination, balance, and proprioception in order to improve patient's ability to

## 2025-04-03 ENCOUNTER — HOSPITAL ENCOUNTER (OUTPATIENT)
Facility: HOSPITAL | Age: 65
Setting detail: RECURRING SERIES
Discharge: HOME | End: 2025-04-06
Payer: MEDICARE

## 2025-04-03 PROCEDURE — 97530 THERAPEUTIC ACTIVITIES: CPT

## 2025-04-03 PROCEDURE — 97110 THERAPEUTIC EXERCISES: CPT

## 2025-04-03 PROCEDURE — 97140 MANUAL THERAPY 1/> REGIONS: CPT

## 2025-04-03 NOTE — PROGRESS NOTES
(visit number/ date) [facility] 12/31/2025    PLAN  - Continue Plan of Care  - Upgrade activities as tolerated    Ivon Bolton PTA    4/3/2025    8:07 AM  If an interpreting service was utilized for treatment of this patient, the contents of this document represent the material reviewed with the patient via the .     Future Appointments   Date Time Provider Department Center   4/3/2025  9:30 AM Ivon Bolton PTA [facility]   4/7/2025  8:45 AM [facility] LAB VISIT [facility]   4/8/2025  9:30 AM Hallie Joseph, PT [facility]   4/10/2025  9:30 AM Hallie Joseph, PT [facility]   4/14/2025 11:00 AM Dandre Roman MD [facility]   4/15/2025  9:30 AM Bernice Josephe, PT [facility]   4/17/2025  9:30 AM Ivon Bolton PTA [facility]   4/22/2025  9:30 AM JosephCristyHallie, PT [facility]   4/24/2025  9:30 AM JosephCristy Cuevaserie, PT [facility]   4/29/2025  9:30 AM JosephBernice Cuevase, PT [facility]   5/1/2025  9:30 AM JosephCristyHallie, PT [facility]

## 2025-04-04 DIAGNOSIS — F51.04 CHRONIC INSOMNIA: ICD-10-CM

## 2025-04-07 ENCOUNTER — HOSPITAL ENCOUNTER (OUTPATIENT)
Facility: HOSPITAL | Age: 65
Setting detail: SPECIMEN
Discharge: HOME | End: 2025-04-10
Payer: MEDICARE

## 2025-04-07 DIAGNOSIS — I10 ESSENTIAL HYPERTENSION: ICD-10-CM

## 2025-04-07 DIAGNOSIS — E78.5 DYSLIPIDEMIA: ICD-10-CM

## 2025-04-07 LAB
ALBUMIN SERPL-MCNC: 3.5 G/DL (ref 3.4–5)
ALBUMIN/GLOB SERPL: 1.2 (ref 0.8–1.7)
ALP SERPL-CCNC: 48 U/L (ref 45–117)
ALT SERPL-CCNC: 27 U/L (ref 13–56)
ANION GAP SERPL CALC-SCNC: 5 MMOL/L (ref 3–18)
AST SERPL-CCNC: 27 U/L (ref 10–38)
BILIRUB SERPL-MCNC: 0.3 MG/DL (ref 0.2–1)
BUN SERPL-MCNC: 23 MG/DL (ref 7–18)
BUN/CREAT SERPL: 22 (ref 12–20)
CALCIUM SERPL-MCNC: 9.4 MG/DL (ref 8.5–10.1)
CHLORIDE SERPL-SCNC: 105 MMOL/L (ref 100–111)
CHOLEST SERPL-MCNC: 168 MG/DL
CO2 SERPL-SCNC: 29 MMOL/L (ref 21–32)
CREAT SERPL-MCNC: 1.03 MG/DL (ref 0.6–1.3)
GLOBULIN SER CALC-MCNC: 3 G/DL (ref 2–4)
GLUCOSE SERPL-MCNC: 79 MG/DL (ref 74–99)
HDLC SERPL-MCNC: 85 MG/DL (ref 40–60)
HDLC SERPL: 2 (ref 0–5)
LDLC SERPL CALC-MCNC: 58.4 MG/DL (ref 0–100)
LIPID PANEL: ABNORMAL
POTASSIUM SERPL-SCNC: 4.3 MMOL/L (ref 3.5–5.5)
PROT SERPL-MCNC: 6.5 G/DL (ref 6.4–8.2)
SODIUM SERPL-SCNC: 139 MMOL/L (ref 136–145)
TRIGL SERPL-MCNC: 123 MG/DL
VLDLC SERPL CALC-MCNC: 24.6 MG/DL

## 2025-04-07 PROCEDURE — 36415 COLL VENOUS BLD VENIPUNCTURE: CPT

## 2025-04-07 PROCEDURE — 80061 LIPID PANEL: CPT

## 2025-04-07 PROCEDURE — 80053 COMPREHEN METABOLIC PANEL: CPT

## 2025-04-07 RX ORDER — ERGOCALCIFEROL 1.25 MG/1
CAPSULE, LIQUID FILLED ORAL
Qty: 6 CAPSULE | Refills: 3 | Status: SHIPPED | OUTPATIENT
Start: 2025-04-07

## 2025-04-07 RX ORDER — ZOLPIDEM TARTRATE 5 MG/1
5 TABLET ORAL NIGHTLY PRN
Qty: 90 TABLET | Refills: 0 | Status: SHIPPED | OUTPATIENT
Start: 2025-04-07 | End: 2025-07-06

## 2025-04-07 NOTE — TELEPHONE ENCOUNTER
[facility]  [place] 4/7/2025     The last fill date for Zolpidem Tartrate 5 Mg Tablet  was 1/27/2025 for a 90 d/s qty 90      Last UDS: completed     CSA Last signed: 6/11/2024        PCP: Dandre Roman MD    Last appt:  2/11/2025  Future Appointments   Date Time Provider Department Center   4/8/2025  9:30 AM Hallie Joseph PT [facility]   4/10/2025  9:30 AM Hallie Joseph, PT [facility]   4/14/2025 11:00 AM Dandre Roman MD [facility]   4/15/2025  9:30 AM Hallie Joseph, PT [facility]   4/17/2025  9:30 AM Ivon Bolton, PTA [facility]   4/22/2025  9:30 AM Hallie Joseph, PT [facility]   4/24/2025  9:30 AM Hallie Joseph, PT [facility]   4/29/2025  9:30 AM Hallie Joseph, PT [facility]   5/1/2025  9:30 AM Hallie Joseph PT [facility]       Requested Prescriptions     Pending Prescriptions Disp Refills    zolpidem (AMBIEN) 5 MG tablet 90 tablet 0     Sig: Take 1 tablet by mouth nightly as needed for Sleep for up to 90 days. Max Daily Amount: 5 mg

## 2025-04-08 ENCOUNTER — APPOINTMENT (OUTPATIENT)
Facility: HOSPITAL | Age: 65
End: 2025-04-08
Payer: MEDICARE

## 2025-04-10 ENCOUNTER — HOSPITAL ENCOUNTER (OUTPATIENT)
Facility: HOSPITAL | Age: 65
Setting detail: RECURRING SERIES
Discharge: HOME | End: 2025-04-13
Payer: MEDICARE

## 2025-04-10 PROCEDURE — 97140 MANUAL THERAPY 1/> REGIONS: CPT

## 2025-04-10 PROCEDURE — 97530 THERAPEUTIC ACTIVITIES: CPT

## 2025-04-10 PROCEDURE — 97110 THERAPEUTIC EXERCISES: CPT

## 2025-04-10 NOTE — THERAPY RECERTIFICATION
[facility]  [address], [address], [place] [address]  Ph:[phone] Fx:[phone]  PHYSICAL THERAPY PROGRESS NOTE  Patient Name: Maria Ines Das : 1960   Medical/Treatment Diagnosis: Trochanteric bursitis of both hips  Pain in right hip  Pain in left hip   Referral Source: Fausto Garcia MD     Date of Initial Visit: 3/14/2025 Attended Visits: 7 Missed Visits: 1     SUMMARY OF TREATMENT  PT intervention has consisted of therapeutic exercise, functional activities, manual therapy, neuromuscular re-education and HEP to improve B hip strength mobility to improve patient's ability to perform recreational and functional activities with ease. MHP utilized for pain management.      CURRENT STATUS    Patient has attended 7 total PT sessions and is making good progress towards  PT goals. Patient is experiencing less pain with activity and sleeping and she is able stand and walk longer with no increased reports of B hip pain. Patient continues to have increased TTP at B hips, is unable to sit for longer than 20 minutes and reports increased difficulty carrying weighted objects and carrying object up the stairs. Patient reports 30% improvement with PT and will continue to benefit from additional PT to further address remaining deficits.     Short Term Goals: To be accomplished in 4 WEEKS  1 patient will have established and be I with HEP to aid with independence and self management at discharge  EVAL HEP issued at evaluation  Current: Patient reports compliance with HEP. MET      2 patient will have pain 1/10 to aid with increase tolerance to ADLS and regular daily activities at home and in the community  EVAL 2  Current: Patient reports 1/10 pain today and tolerates ADL's and regular daily activities at home and in the community with no difficulty. MET      Long Term Goals: To be accomplished in 8 WEEKS  1 patient will have established and be I with

## 2025-04-10 NOTE — PROGRESS NOTES
PHYSICAL / OCCUPATIONAL THERAPY - DAILY TREATMENT NOTE    Patient Name: Maria Ines Das    Date: 4/10/2025    : 1960  Insurance: Payor: MEDICARE / Plan: MEDICARE PART A AND B / Product Type: *No Product type* /      Patient  verified Yes     Visit #   Current / Total 7 16   Time   In / Out 930 am  1015 am    Pain   In / Out 1/10 0/10    Subjective Functional Status/Changes:  \"I have not been able to due my exercises the last couple days because my  is in [facility].\"      TREATMENT AREA =  Trochanteric bursitis of both hips  Pain in right hip  Pain in left hip    OBJECTIVE      Therapeutic Procedures:    Tx Min Billable or 1:1 Min (if diff from Tx Min) Procedure, Rationale, Specifics   15   90312 Therapeutic Activity (timed):  use of dynamic activities replicating functional movements to increase ROM, strength, coordination, balance, and proprioception in order to improve patient's ability to progress to PLOF and address remaining functional goals.  (see flow sheet as applicable)      Details if applicable:          99348 Therapeutic Exercise (timed):  increase ROM, strength, coordination, balance, and proprioception to improve patient's ability to progress to PLOF and address remaining functional goals. (see flow sheet as applicable)      Details if applicable:     10   42806 Manual Therapy (timed):  decrease pain, increase ROM, increase tissue extensibility, and decrease trigger points to improve patient's ability to progress to PLOF and address remaining functional goals.  The manual therapy interventions were performed at a separate and distinct time from the therapeutic activities interventions . (see flow sheet as applicable)      Details if applicable:  Shot gun mobs/MET; DTM and stretching (B) piriformis and glute med           Details if applicable:            Details if applicable:     45  [facility] Totals Reminder: bill using total billable min of TIMED therapeutic procedures (example:

## 2025-04-11 SDOH — ECONOMIC STABILITY: TRANSPORTATION INSECURITY
IN THE PAST 12 MONTHS, HAS LACK OF TRANSPORTATION KEPT YOU FROM MEETINGS, WORK, OR FROM GETTING THINGS NEEDED FOR DAILY LIVING?: NO

## 2025-04-11 SDOH — ECONOMIC STABILITY: FOOD INSECURITY: WITHIN THE PAST 12 MONTHS, YOU WORRIED THAT YOUR FOOD WOULD RUN OUT BEFORE YOU GOT MONEY TO BUY MORE.: NEVER TRUE

## 2025-04-11 SDOH — ECONOMIC STABILITY: FOOD INSECURITY: WITHIN THE PAST 12 MONTHS, THE FOOD YOU BOUGHT JUST DIDN'T LAST AND YOU DIDN'T HAVE MONEY TO GET MORE.: NEVER TRUE

## 2025-04-11 SDOH — ECONOMIC STABILITY: INCOME INSECURITY: IN THE LAST 12 MONTHS, WAS THERE A TIME WHEN YOU WERE NOT ABLE TO PAY THE MORTGAGE OR RENT ON TIME?: NO

## 2025-04-11 SDOH — ECONOMIC STABILITY: TRANSPORTATION INSECURITY
IN THE PAST 12 MONTHS, HAS THE LACK OF TRANSPORTATION KEPT YOU FROM MEDICAL APPOINTMENTS OR FROM GETTING MEDICATIONS?: NO

## 2025-04-14 ENCOUNTER — OFFICE VISIT (OUTPATIENT)
Facility: CLINIC | Age: 65
End: 2025-04-14
Payer: MEDICARE

## 2025-04-14 VITALS
BODY MASS INDEX: 27 KG/M2 | HEIGHT: 61 IN | WEIGHT: 143 LBS | RESPIRATION RATE: 16 BRPM | DIASTOLIC BLOOD PRESSURE: 62 MMHG | TEMPERATURE: 98.1 F | SYSTOLIC BLOOD PRESSURE: 129 MMHG | HEART RATE: 73 BPM | OXYGEN SATURATION: 96 %

## 2025-04-14 DIAGNOSIS — H81.09 MENIERE'S DISEASE, UNSPECIFIED LATERALITY: ICD-10-CM

## 2025-04-14 DIAGNOSIS — M06.09 RHEUMATOID ARTHRITIS OF MULTIPLE SITES WITH NEGATIVE RHEUMATOID FACTOR (HCC): ICD-10-CM

## 2025-04-14 DIAGNOSIS — I10 ESSENTIAL HYPERTENSION: Primary | ICD-10-CM

## 2025-04-14 DIAGNOSIS — E78.5 DYSLIPIDEMIA: ICD-10-CM

## 2025-04-14 DIAGNOSIS — F32.A DEPRESSION, UNSPECIFIED DEPRESSION TYPE: ICD-10-CM

## 2025-04-14 DIAGNOSIS — K21.9 GASTROESOPHAGEAL REFLUX DISEASE WITHOUT ESOPHAGITIS: ICD-10-CM

## 2025-04-14 DIAGNOSIS — E04.1 THYROID NODULE: ICD-10-CM

## 2025-04-14 PROCEDURE — 1159F MED LIST DOCD IN RCRD: CPT | Performed by: INTERNAL MEDICINE

## 2025-04-14 PROCEDURE — G8419 CALC BMI OUT NRM PARAM NOF/U: HCPCS | Performed by: INTERNAL MEDICINE

## 2025-04-14 PROCEDURE — G8399 PT W/DXA RESULTS DOCUMENT: HCPCS | Performed by: INTERNAL MEDICINE

## 2025-04-14 PROCEDURE — 1123F ACP DISCUSS/DSCN MKR DOCD: CPT | Performed by: INTERNAL MEDICINE

## 2025-04-14 PROCEDURE — 1090F PRES/ABSN URINE INCON ASSESS: CPT | Performed by: INTERNAL MEDICINE

## 2025-04-14 PROCEDURE — 3074F SYST BP LT 130 MM HG: CPT | Performed by: INTERNAL MEDICINE

## 2025-04-14 PROCEDURE — G2211 COMPLEX E/M VISIT ADD ON: HCPCS | Performed by: INTERNAL MEDICINE

## 2025-04-14 PROCEDURE — 99214 OFFICE O/P EST MOD 30 MIN: CPT | Performed by: INTERNAL MEDICINE

## 2025-04-14 PROCEDURE — 3017F COLORECTAL CA SCREEN DOC REV: CPT | Performed by: INTERNAL MEDICINE

## 2025-04-14 PROCEDURE — 3078F DIAST BP <80 MM HG: CPT | Performed by: INTERNAL MEDICINE

## 2025-04-14 PROCEDURE — G8427 DOCREV CUR MEDS BY ELIG CLIN: HCPCS | Performed by: INTERNAL MEDICINE

## 2025-04-14 PROCEDURE — 1036F TOBACCO NON-USER: CPT | Performed by: INTERNAL MEDICINE

## 2025-04-14 RX ORDER — LIFITEGRAST 50 MG/ML
1 SOLUTION/ DROPS OPHTHALMIC NIGHTLY
COMMUNITY

## 2025-04-14 RX ORDER — CARBOXYMETHYLCELLULOSE SODIUM 5 MG/ML
1 SOLUTION/ DROPS OPHTHALMIC PRN
COMMUNITY

## 2025-04-14 ASSESSMENT — PATIENT HEALTH QUESTIONNAIRE - PHQ9
SUM OF ALL RESPONSES TO PHQ QUESTIONS 1-9: 0
1. LITTLE INTEREST OR PLEASURE IN DOING THINGS: NOT AT ALL
SUM OF ALL RESPONSES TO PHQ QUESTIONS 1-9: 0
8. MOVING OR SPEAKING SO SLOWLY THAT OTHER PEOPLE COULD HAVE NOTICED. OR THE OPPOSITE, BEING SO FIGETY OR RESTLESS THAT YOU HAVE BEEN MOVING AROUND A LOT MORE THAN USUAL: NOT AT ALL
3. TROUBLE FALLING OR STAYING ASLEEP: NOT AT ALL
5. POOR APPETITE OR OVEREATING: NOT AT ALL
10. IF YOU CHECKED OFF ANY PROBLEMS, HOW DIFFICULT HAVE THESE PROBLEMS MADE IT FOR YOU TO DO YOUR WORK, TAKE CARE OF THINGS AT HOME, OR GET ALONG WITH OTHER PEOPLE: NOT DIFFICULT AT ALL
4. FEELING TIRED OR HAVING LITTLE ENERGY: NOT AT ALL
SUM OF ALL RESPONSES TO PHQ QUESTIONS 1-9: 0
6. FEELING BAD ABOUT YOURSELF - OR THAT YOU ARE A FAILURE OR HAVE LET YOURSELF OR YOUR FAMILY DOWN: NOT AT ALL
7. TROUBLE CONCENTRATING ON THINGS, SUCH AS READING THE NEWSPAPER OR WATCHING TELEVISION: NOT AT ALL
2. FEELING DOWN, DEPRESSED OR HOPELESS: NOT AT ALL
SUM OF ALL RESPONSES TO PHQ QUESTIONS 1-9: 0
9. THOUGHTS THAT YOU WOULD BE BETTER OFF DEAD, OR OF HURTING YOURSELF: NOT AT ALL

## 2025-04-14 NOTE — PROGRESS NOTES
Maria Ines Das presents today for   Chief Complaint   Patient presents with    6 Month Follow-Up    Hypertension       \"Have you been to the ER, urgent care clinic since your last visit?  Hospitalized since your last visit?\"    NO    “Have you seen or consulted any other health care providers outside of [facility] since your last visit?”    YES - When: approximately 2 months ago.  Where and Why: [facility]/Dr. Garcia.             
(H) 12 - 20      Est, Glom Filt Rate 60 (L) >60 ml/min/1.73m2    Calcium 9.4 8.5 - 10.1 MG/DL    Total Bilirubin 0.3 0.2 - 1.0 MG/DL    ALT 27 13 - 56 U/L    AST 27 10 - 38 U/L    Alk Phosphatase 48 45 - 117 U/L    Total Protein 6.5 6.4 - 8.2 g/dL    Albumin 3.5 3.4 - 5.0 g/dL    Globulin 3.0 2.0 - 4.0 g/dL    Albumin/Globulin Ratio 1.2 0.8 - 1.7     Lipid Panel   Result Value Ref Range    LIPID PANEL        Cholesterol, Total 168 <200 MG/DL    Triglycerides 123 <150 MG/DL    HDL 85 (H) 40 - 60 MG/DL    LDL Cholesterol 58.4 0 - 100 MG/DL    VLDL Cholesterol Calculated 24.6 MG/DL    Chol/HDL Ratio 2.0 0 - 5.0       We reviewed the patient's labs from the last several visits to point out trends in the numbers      Patient Active Problem List   Diagnosis    Dyslipidemia    Essential hypertension    Gastroesophageal reflux disease without esophagitis    Chronic constipation    Rheumatoid arthritis of multiple sites with negative rheumatoid factor (HCC)    Overweight (BMI 25.0-29.9)    Depression    Osteopenia    Thyroid nodule    Chronic insomnia    CAMPOS (stress urinary incontinence, female)    Fibromyalgia    Meniere's disease       Assessment and plan:  1.  Allergic rhinitis.  Continue current  2.  GERD.  PPI and avoidance measures  3.  Hypertension.  Controlled on current  4.  Obesity. Lifestyle and dietary measures.  Portion control reiterated.  5.  Thyroid nodules.  F/U Dr. Gutierrez as needed  6.  Osteopenia. Continue current and f/u Dr Garcia  7.  RA.  Per Dr Garcia  8.  Depression and anxiety.  Continue lexapro  9.  Constipation.  Continue citrucel  10.  Insomnia.  Continue current regimen.  11.  Hyperlipidemia.  Continue prava  12.  Meniere's  per ENT        RTC 4/26      Above conditions discussed at length and patient vocalized understanding.  All questions answered to patient satisfaction     Diagnosis Orders   1. Essential hypertension  Comprehensive Metabolic Panel    CBC with Auto Differential      2.

## 2025-04-15 ENCOUNTER — HOSPITAL ENCOUNTER (OUTPATIENT)
Facility: HOSPITAL | Age: 65
Setting detail: RECURRING SERIES
Discharge: HOME | End: 2025-04-18
Payer: MEDICARE

## 2025-04-15 PROCEDURE — 97140 MANUAL THERAPY 1/> REGIONS: CPT

## 2025-04-15 PROCEDURE — 97110 THERAPEUTIC EXERCISES: CPT

## 2025-04-15 PROCEDURE — 97530 THERAPEUTIC ACTIVITIES: CPT

## 2025-04-15 NOTE — PROGRESS NOTES
(timed):  increase ROM, strength, coordination, balance, and proprioception to improve patient's ability to progress to PLOF and address remaining functional goals. (see flow sheet as applicable)     Details if applicable:       12 12 97530 Therapeutic Activity (timed):  use of dynamic activities replicating functional movements to increase ROM, strength, coordination, balance, and proprioception in order to improve patient's ability to progress to PLOF and address remaining functional goals.  (see flow sheet as applicable)     Details if applicable:     12 12 97140 Manual Therapy (timed):  decrease pain, increase ROM, increase tissue extensibility, and decrease trigger points to improve patient's ability to progress to PLOF and address remaining functional goals.  The manual therapy interventions were performed at a separate and distinct time from the therapeutic activities interventions . (see flow sheet as applicable)     Details if applicable:  R SL for STM DTM Effleurage left piriformis and ITBand, SL superior glut stretch L and ITB stretch L           Details if applicable:            Details if applicable:     46 46 [facility] Totals Reminder: bill using total billable min of TIMED therapeutic procedures (example: do not include dry needle or estim unattended, both untimed codes, in totals to left)  8-22 min = 1 unit; 23-37 min = 2 units; 38-52 min = 3 units; 53-67 min = 4 units; 68-82 min = 5 units   Total Total     Charge Capture    [x]  Patient Education billed concurrently with other procedures   [x] Review HEP    [] Progressed/Changed HEP, detail:    [] Other detail:       Objective Information/Functional Measures/Assessment    VC exercises and tech  Progressing as able and expected  Decrease pain and improved sleep tolerance  CCO L>R hip and lateral thigh residual tightness    Patient will continue to benefit from skilled PT / OT services to modify and progress therapeutic interventions, analyze and address

## 2025-04-17 ENCOUNTER — HOSPITAL ENCOUNTER (OUTPATIENT)
Facility: HOSPITAL | Age: 65
Setting detail: RECURRING SERIES
Discharge: HOME | End: 2025-04-20
Payer: MEDICARE

## 2025-04-17 PROCEDURE — 97140 MANUAL THERAPY 1/> REGIONS: CPT

## 2025-04-17 PROCEDURE — 97530 THERAPEUTIC ACTIVITIES: CPT

## 2025-04-17 PROCEDURE — 97110 THERAPEUTIC EXERCISES: CPT

## 2025-04-17 NOTE — PROGRESS NOTES
PHYSICAL / OCCUPATIONAL THERAPY - DAILY TREATMENT NOTE    Patient Name: Maria Ines Das    Date: 2025    : 1960  Insurance: Payor: MEDICARE / Plan: MEDICARE PART A AND B / Product Type: *No Product type* /      Patient  verified Yes     Visit #   Current / Total 9 16   Time   In / Out 930 am  1030 am    Pain   In / Out 3-4/10  2/10    Subjective Functional Status/Changes: \"I'm really sore. I don't know if it's from the hip joints or the hip flexors muscle.\"      TREATMENT AREA =  Trochanteric bursitis of both hips  Pain in right hip  Pain in left hip    OBJECTIVE    Modalities Rationale:     decrease pain and increase tissue extensibility to improve patient's ability to progress to PLOF and address remaining functional goals.     min [] Estim Unattended, type/location:                                      []  w/ice    []  w/heat    min [] Estim Attended, type/location:                                     []  w/US     []  w/ice    []  w/heat    []  TENS insruct      min []  Mechanical Traction: type/lbs                   []  pro   []  sup   []  int   []  cont    []  before manual    []  after manual    min []  Ultrasound, settings/location:     10 min  unbill []  Ice     [x]  Heat    location/position: Prone to L/S    min []  Paraffin,  details:     min []  Vasopneumatic Device, press/temp:     min []  Whirlpool / Fluido:    If using vaso (only need to measure limb vaso being performed on)      pre-treatment girth :       post-treatment girth :       measured at (landmark location) :      min []  Other:    Skin assessment post-treatment:   Intact      Therapeutic Procedures:    Tx Min Billable or 1:1 Min (if diff from Tx Min) Procedure, Rationale, Specifics   18  38157 Therapeutic Exercise (timed):  increase ROM, strength, coordination, balance, and proprioception to improve patient's ability to progress to PLOF and address remaining functional goals. (see flow sheet as applicable)     Details if

## 2025-04-22 ENCOUNTER — HOSPITAL ENCOUNTER (OUTPATIENT)
Facility: HOSPITAL | Age: 65
Setting detail: RECURRING SERIES
Discharge: HOME | End: 2025-04-25
Payer: MEDICARE

## 2025-04-22 PROCEDURE — 97110 THERAPEUTIC EXERCISES: CPT

## 2025-04-22 PROCEDURE — 97530 THERAPEUTIC ACTIVITIES: CPT

## 2025-04-22 PROCEDURE — 97140 MANUAL THERAPY 1/> REGIONS: CPT

## 2025-04-22 NOTE — PROGRESS NOTES
PHYSICAL / OCCUPATIONAL THERAPY - DAILY TREATMENT NOTE    Patient Name: Maria Ines Das    Date: 2025    : 1960  Insurance: Payor: MEDICARE / Plan: MEDICARE PART A AND B / Product Type: *No Product type* /      Patient  verified Yes     Visit #   Current / Total 10 16   Time   In / Out 933 1028   Pain   In / Out 2 2   Subjective Functional Status/Changes: I feel like I am getting stronger, still sore along the side of the thighs, tenderness at the hip joint where the bursa is.        TREATMENT AREA =  Trochanteric bursitis of both hips  Pain in right hip  Pain in left hip    OBJECTIVE    Modalities Rationale:     decrease pain and increase tissue extensibility to improve patient's ability to progress to PLOF and address remaining functional goals.     min [] Estim Unattended, type/location:                                      []  w/ice    []  w/heat    min [] Estim Attended, type/location:                                     []  w/US     []  w/ice    []  w/heat    []  TENS insruct      min []  Mechanical Traction: type/lbs                   []  pro   []  sup   []  int   []  cont    []  before manual    []  after manual    min []  Ultrasound, settings/location:     10 min  unbill []  Ice     [x]  Heat    post location/position: Prone to B LS and gluts     min []  Paraffin,  details:     min []  Vasopneumatic Device, press/temp:     min []  Whirlpool / Fluido:    If using vaso (only need to measure limb vaso being performed on)      pre-treatment girth :       post-treatment girth :       measured at (landmark location) :      min []  Other:    Skin assessment post-treatment:   Intact      Therapeutic Procedures:    Tx Min Billable or 1:1 Min (if diff from Tx Min) Procedure, Rationale, Specifics    21884 Therapeutic Exercise (timed):  increase ROM, strength, coordination, balance, and proprioception to improve patient's ability to progress to PLOF and address remaining functional goals. (see

## 2025-04-24 ENCOUNTER — HOSPITAL ENCOUNTER (OUTPATIENT)
Facility: HOSPITAL | Age: 65
Setting detail: RECURRING SERIES
Discharge: HOME | End: 2025-04-27
Payer: MEDICARE

## 2025-04-24 PROCEDURE — 97140 MANUAL THERAPY 1/> REGIONS: CPT

## 2025-04-24 PROCEDURE — 97110 THERAPEUTIC EXERCISES: CPT

## 2025-04-24 PROCEDURE — 97530 THERAPEUTIC ACTIVITIES: CPT

## 2025-04-24 NOTE — PROGRESS NOTES
Next PN/ RC due 5/10/2025  Auth due (visit number/ date) [place] 12/31/2025    PLAN  - Continue Plan of Care  - Upgrade activities as tolerated    Hallie Joseph, PT    4/24/2025    9:34 AM  If an interpreting service was utilized for treatment of this patient, the contents of this document represent the material reviewed with the patient via the .     Future Appointments   Date Time Provider Department Center   4/29/2025  9:30 AM [facility]   5/1/2025  9:30 AM [facility]   4/8/2026  8:00 AM [facility] LAB VISIT [facility]   4/15/2026  9:00 AM Dandre Roman MD [facility]

## 2025-04-29 ENCOUNTER — HOSPITAL ENCOUNTER (OUTPATIENT)
Facility: HOSPITAL | Age: 65
Setting detail: RECURRING SERIES
Discharge: HOME | End: 2025-05-02
Payer: MEDICARE

## 2025-04-29 DIAGNOSIS — M85.80 OSTEOPENIA, UNSPECIFIED LOCATION: ICD-10-CM

## 2025-04-29 DIAGNOSIS — Z78.0 MENOPAUSE: ICD-10-CM

## 2025-04-29 DIAGNOSIS — F51.04 CHRONIC INSOMNIA: ICD-10-CM

## 2025-04-29 DIAGNOSIS — I10 ESSENTIAL HYPERTENSION: ICD-10-CM

## 2025-04-29 DIAGNOSIS — K21.9 GASTROESOPHAGEAL REFLUX DISEASE WITHOUT ESOPHAGITIS: Primary | ICD-10-CM

## 2025-04-29 PROCEDURE — 97035 APP MDLTY 1+ULTRASOUND EA 15: CPT

## 2025-04-29 PROCEDURE — 97110 THERAPEUTIC EXERCISES: CPT

## 2025-04-29 PROCEDURE — 97530 THERAPEUTIC ACTIVITIES: CPT

## 2025-04-29 NOTE — TELEPHONE ENCOUNTER
Last OV: 4/14/2025  Next OV: 4/15/2026  Last refill: 1/24/2025        reviewed 4/29/2025    Ambien last filled 1/27/2025 for a 90 d/s qty 90

## 2025-04-29 NOTE — PROGRESS NOTES
deficits, analyze and address soft tissue restrictions, analyze and cue for proper movement patterns, analyze and modify for postural abnormalities, and instruct in home and community integration to address functional deficits and attain remaining goals.    Progress toward goals / Updated goals:  []  See Progress Note/Recertification    Short Term Goals: To be accomplished in 4 WEEKS  1 patient will have established and be I with HEP to aid with independence and self management at discharge  EVAL HEP issued at evaluation  PN: Patient reports compliance with HEP. MET    Current still remains compliant 4/29/25     2 patient will have pain 1/10 to aid with increase tolerance to ADLS and regular daily activities at home and in the community  EVAL 2  PN Patient reports 1/10 pain today and tolerates ADL's and regular daily activities at home and in the community with no difficulty. MET    Current: Patient reports 3/10 pain today. 4/29/25     Long Term Goals: To be accomplished in 8 WEEKS  1 patient will have established and be I with HEP to aid with independence and self management at discharge  EVAL HEP issued at evaluation  PN  Patient reports compliance with HEP. MET   Current remains compliant 4/29/25     2 patient will have LEFS 67 to aid with increase tolerance to ADLS and regular daily activities at home and in the community  EVAL 58  PN LEFS 56. Regressed   Current: Ongoing, will assess at next PN. 4/29/2025      3 patient will have overall 50% reported improvement to aid with increase tolerance to her walking program  EVAL pain worse with walking   PN : Patient reports 30% improvement with PT and has returned back to walking.   Current: Ongoing, will assess at next PN. 4/29/2025      4 patient will have MMT B hip F and abd 5 to aid with increase tolerance to sit to stand transfers  EVAL: hip F  L 4, R 4+   hip abd L 4  R 4+  PN  hip F  L 4+, R 4+   hip abd L 4+  R 5. Progressing  Current: Ongoing, will assess at

## 2025-05-01 ENCOUNTER — HOSPITAL ENCOUNTER (OUTPATIENT)
Facility: HOSPITAL | Age: 65
Setting detail: RECURRING SERIES
Discharge: HOME | End: 2025-05-04
Payer: MEDICARE

## 2025-05-01 PROCEDURE — 97035 APP MDLTY 1+ULTRASOUND EA 15: CPT

## 2025-05-01 PROCEDURE — 97530 THERAPEUTIC ACTIVITIES: CPT

## 2025-05-01 PROCEDURE — 97110 THERAPEUTIC EXERCISES: CPT

## 2025-05-01 RX ORDER — BENAZEPRIL HYDROCHLORIDE 10 MG/1
10 TABLET ORAL DAILY
Qty: 90 TABLET | Refills: 3 | Status: SHIPPED | OUTPATIENT
Start: 2025-05-01

## 2025-05-01 RX ORDER — PANTOPRAZOLE SODIUM 40 MG/1
TABLET, DELAYED RELEASE ORAL
Qty: 90 TABLET | Refills: 3 | Status: SHIPPED | OUTPATIENT
Start: 2025-05-01

## 2025-05-01 RX ORDER — ESTRADIOL 1 MG/1
1 TABLET ORAL DAILY
Qty: 90 TABLET | Refills: 3 | Status: SHIPPED | OUTPATIENT
Start: 2025-05-01

## 2025-05-01 RX ORDER — ZOLPIDEM TARTRATE 5 MG/1
5 TABLET ORAL NIGHTLY PRN
Qty: 90 TABLET | Refills: 0 | Status: SHIPPED | OUTPATIENT
Start: 2025-05-01 | End: 2025-07-30

## 2025-05-01 RX ORDER — ERGOCALCIFEROL 1.25 MG/1
50000 CAPSULE, LIQUID FILLED ORAL WEEKLY
Qty: 12 CAPSULE | Refills: 2 | Status: SHIPPED | OUTPATIENT
Start: 2025-05-01

## 2025-05-01 NOTE — PROGRESS NOTES
PHYSICAL / OCCUPATIONAL THERAPY - DAILY TREATMENT NOTE    Patient Name: Maria Ines Das    Date: 2025    : 1960  Insurance: Payor: MEDICARE / Plan: MEDICARE PART A AND B / Product Type: *No Product type* /      Patient  verified Yes     Visit #   Current / Total 13 16   Time   In / Out 930 am  1016   Pain   In / Out 1/10  0/10   Subjective Functional Status/Changes: Patient reports feeling better after having the Ultrasound last session. \"I also got a massage yesterday. So that might have helped as well.\"      TREATMENT AREA =  Trochanteric bursitis of both hips  Pain in right hip  Pain in left hip    OBJECTIVE    Modalities Rationale:     decrease pain and increase tissue extensibility to improve patient's ability to progress to PLOF and address remaining functional goals.     min [] Estim Unattended, type/location:                                      []  w/ice    []  w/heat    min [] Estim Attended, type/location:                                     []  w/US     []  w/ice    []  w/heat    []  TENS insruct      min []  Mechanical Traction: type/lbs                   []  pro   []  sup   []  int   []  cont    []  before manual    []  after manual   6'/6' min []  Ultrasound, settings/location:  1.5 w/cm3 to B lateral hip joint    min  unbill []  Ice     []  Heat    location/position: Prone to L/S    min []  Paraffin,  details:     min []  Vasopneumatic Device, press/temp:     min []  Whirlpool / Fluido:    If using vaso (only need to measure limb vaso being performed on)      pre-treatment girth :       post-treatment girth :       measured at (landmark location) :      min []  Other:    Skin assessment post-treatment:   Intact      Therapeutic Procedures:    Tx Min Billable or 1:1 Min (if diff from Tx Min) Procedure, Rationale, Specifics   12  32283 Therapeutic Exercise (timed):  increase ROM, strength, coordination, balance, and proprioception to improve patient's ability to progress to PLOF and

## 2025-05-02 NOTE — TELEPHONE ENCOUNTER
Received a fax from pharmacy notifying that pantoprazole (PROTONIX) 40 MG tablet  missing sig on prescription.

## 2025-05-06 ENCOUNTER — HOSPITAL ENCOUNTER (OUTPATIENT)
Facility: HOSPITAL | Age: 65
Setting detail: RECURRING SERIES
Discharge: HOME | End: 2025-05-09
Payer: MEDICARE

## 2025-05-06 PROCEDURE — 97530 THERAPEUTIC ACTIVITIES: CPT

## 2025-05-06 PROCEDURE — 97110 THERAPEUTIC EXERCISES: CPT

## 2025-05-06 PROCEDURE — 97035 APP MDLTY 1+ULTRASOUND EA 15: CPT

## 2025-05-06 NOTE — PROGRESS NOTES
PHYSICAL / OCCUPATIONAL THERAPY - DAILY TREATMENT NOTE    Patient Name: Maria Ines Das    Date: 2025    : 1960  Insurance: Payor: MEDICARE / Plan: MEDICARE PART A AND B / Product Type: *No Product type* /      Patient  verified Yes     Visit #   Current / Total 14 16   Time   In / Out 810 858   Pain   In / Out 5 5   Subjective Functional Status/Changes: Reports that her hips feel hot and throbby. Onset increased pain last  and Fri and unsure why. B hips and upper agustina lateral thighs.      TREATMENT AREA =  Trochanteric bursitis of both hips  Pain in right hip  Pain in left hip    OBJECTIVE    Modalities Rationale:     decrease pain and increase tissue extensibility to improve patient's ability to progress to PLOF and address remaining functional goals.     min [] Estim Unattended, type/location:                                      []  w/ice    []  w/heat    min [] Estim Attended, type/location:                                     []  w/US     []  w/ice    []  w/heat    []  TENS insruct      min []  Mechanical Traction: type/lbs                   []  pro   []  sup   []  int   []  cont    []  before manual    []  after manual   12 min [x]  Ultrasound, settings/location:  B hips 1 Mhz,   1.0 W/cm2 in SL     min  unbill []  Ice     []  Heat    location/position:     min []  Paraffin,  details:     min []  Vasopneumatic Device, press/temp:     min []  Whirlpool / Fluido:    If using vaso (only need to measure limb vaso being performed on)      pre-treatment girth :       post-treatment girth :       measured at (landmark location) :      min []  Other:    Skin assessment post-treatment:   Intact      Therapeutic Procedures:    Tx Min Billable or 1:1 Min (if diff from Tx Min) Procedure, Rationale, Specifics   8 8 [id number] Therapeutic Activity (timed):  use of dynamic activities replicating functional movements to increase ROM, strength, coordination, balance, and proprioception in order to improve

## 2025-05-08 ENCOUNTER — HOSPITAL ENCOUNTER (OUTPATIENT)
Facility: HOSPITAL | Age: 65
Setting detail: RECURRING SERIES
Discharge: HOME | End: 2025-05-11
Payer: MEDICARE

## 2025-05-08 PROCEDURE — 97530 THERAPEUTIC ACTIVITIES: CPT

## 2025-05-08 PROCEDURE — 97535 SELF CARE MNGMENT TRAINING: CPT

## 2025-05-08 PROCEDURE — 97035 APP MDLTY 1+ULTRASOUND EA 15: CPT

## 2025-05-08 PROCEDURE — 97110 THERAPEUTIC EXERCISES: CPT

## 2025-05-08 NOTE — THERAPY DISCHARGE
Physical Therapy Discharge Instructions      [facility] - [address], [address]  [place] [address] [phone] fax    Patient: Maria Ines Das  : 1960      Continue Home Exercise Program 1-2 times per day for 3 weeks, then decrease to 3 times per week      Continue with    [x] Ice  as needed 1-2 times per day     [x] Heat           Follow up with MD:     [] Upon completion of therapy     [x] As needed      Recommendations:     []   Return to activity with home program    [x]   Return to activity with the following modifications:       []Post Rehab Program    []Join Independent aquatic program     []Return to/join local gym      Additional Comments: Please call office with any questions or concerns      Ivon Bolton PTA 2025 10:26 AM    
  Current:Patient reports 75% improvement with PT and has done some walking with no issues. MET      4 patient will have MMT B hip F and abd 5 to aid with increase tolerance to sit to stand transfers  EVAL: hip F  L 4, R 4+   hip abd L 4  R 4+  PN  hip F  L 4+, R 4+   hip abd L 4+  R 5. Progressing  Current: hip F 5/5   hip abd L 4+  R 5. Partially MET    RECOMMENDATIONS  Discontinue therapy. Progressing towards or have reached established goals.    Ivon Bolton, PTA       5/8/2025       8:14 AM  If an interpreting service was utilized for treatment of this patient, the contents of this document represent the material reviewed with the patient via the .     Payor: MEDICARE / Plan: MEDICARE PART A AND B / Product Type: *No Product type* /      Not Medicaid Ins, no signature required for DC

## 2025-05-13 ENCOUNTER — APPOINTMENT (OUTPATIENT)
Facility: HOSPITAL | Age: 65
End: 2025-05-13
Payer: MEDICARE

## 2025-07-02 ENCOUNTER — HOSPITAL ENCOUNTER (OUTPATIENT)
Facility: HOSPITAL | Age: 65
Setting detail: RECURRING SERIES
Discharge: HOME | End: 2025-07-05
Payer: MEDICARE

## 2025-07-02 PROCEDURE — 97162 PT EVAL MOD COMPLEX 30 MIN: CPT

## 2025-07-02 PROCEDURE — 97110 THERAPEUTIC EXERCISES: CPT

## 2025-07-02 PROCEDURE — 97535 SELF CARE MNGMENT TRAINING: CPT

## 2025-07-02 NOTE — THERAPY EVALUATION
[facility]  [address], [address], [place] [address]  Ph:[phone] Fx:[phone]  Plan of Care / Statement of Necessity for Physical Therapy Services     Patient Name: Maria Ines Das : 1960   Medical   Diagnosis: Trochanteric bursitis, unspecified hip Treatment Diagnosis:  M25.551  RIGHT HIP PAIN and M25.552  LEFT HIP PAIN    Onset Date: 10/2024     Referral Source: Anthony Juan MD Start of Care (SOC): 2025   Prior Hospitalization: See medical history Provider #: [id number]   Prior Level of Function: Independent with ADLs and IADLs; participates in a walking program   Comorbidities:  Musculoskeletal disorders, Social determinants of health: Alcohol, and Other: High cholesterol, HTN, RA, and polymyalgia rheumatica      Not post operative, standard auth procedure    Evaluation Complexity:  History:  HIGH Complexity :3+ comorbidities / personal factors will impact the outcome/ POC ; Examination:  MEDIUM Complexity : 3 Standardized tests and measures addressin body structure, function, activity limitation and / or participation in recreation  ;Presentation:  MEDIUM Complexity : Evolving with changing characteristics  ;Clinical Decision Making: Lower Extremity Functional Scale (LEFS) = 40 ; (40-60 Mild to Moderate Dysfunction) = MODERATE Complexity  Overall Complexity Rating: MEDIUM  Problem List: pain affecting function, decrease strength, decrease ADL/functional abilities, decrease activity tolerance, and decrease flexibility/joint mobility   Treatment Plan may include any combination of the followin Therapeutic Exercise, 64527 Neuromuscular Re-Education, 94591 Manual Therapy, 16090 Therapeutic Activity, 28250 Self Care/Home Management, 76992 Electrical Stim unattended /  (MCR), 93129 Electrical Stim attended, 32127 Ultrasound, and (Elective Self Pay) Needle Insertion w/o Injection (1 or 2 muscles), (3+ muscles)  Patient / 
muscles), (3+ muscles)  Patient / Family readiness to learn indicated by: asking questions, trying to perform skills, and return demonstration   Persons(s) to be included in education: patient (P)  Barriers to Learning/Limitations: none  Measures taken if barriers to learning present: N/A  Patient Goal (s): \"I want to lose this pain\" \"I want to be able to walk and go up and down stairs without pain\"  Patient Self Reported Health Status: good  Rehabilitation Potential: good    SUBJECTIVE  Pain Level (0-10 scale): Worst = 8/10      Best = 1/10       Today = 3/10 L  5/10 R  [x]constant []intermittent []improving [x]worsening []no change since onset    Subjective functional status/changes:     Current symptoms/Complaints: Pt reports having hip and back pain before and went to PT and resolved the back pain but the bursitis causes sharp/burning pain down to her knees still. Pt reports the injections only providing temporary relief for a couple of days.   Limitations to PLOF: difficulty going up stairs, walking for long periods cause soreness, unable to sleep comfortably because she is a side sleeper  Mechanism of Injury: N/A  Previous Treatment/Compliance: compliant with PT  PMHx/Surgical Hx: steroid injections 11/24, 2/25, and 5/25  Work Hx: retired  Living Situation: lives with   Hobbies: walking program    OBJECTIVE/EXAMINATION    Gait:  [x] Normal    [] Abnormal    [] Antalgic    [] NWB    Device:    Describe:         ROM/Strength:   Hip and knee AROM = WFL           Strength (1-5)  Hip Left Right   Flexion 4/5 4-/5   Extension 3+/5 3+/5   Abduction 3+/5 P! 3+/5 P!   ER 4/5 4/5   Knee Left Right   Extension 4/5 4-/5 P!   Flexion 4/5 4/5 P!        Flexibility: [] Unable to assess at this time  Hamstrings:    (L) Tightness= [] WNL   [x] Min   [] Mod   [] Severe    (R) Tightness= [] WNL   [] Min   [x] Mod   [] Severe  Quadriceps:    (L) Tightness= [] WNL   [x] Min   [] Mod   [] Severe    (R) Tightness= [] WNL

## 2025-07-09 ENCOUNTER — HOSPITAL ENCOUNTER (OUTPATIENT)
Facility: HOSPITAL | Age: 65
Setting detail: RECURRING SERIES
Discharge: HOME | End: 2025-07-12
Payer: MEDICARE

## 2025-07-09 PROCEDURE — 20560 NDL INSJ W/O NJX 1 OR 2 MUSC: CPT

## 2025-07-09 PROCEDURE — 97530 THERAPEUTIC ACTIVITIES: CPT

## 2025-07-09 PROCEDURE — 97112 NEUROMUSCULAR REEDUCATION: CPT

## 2025-07-09 PROCEDURE — 97110 THERAPEUTIC EXERCISES: CPT

## 2025-07-09 PROCEDURE — 97032 APPL MODALITY 1+ESTIM EA 15: CPT

## 2025-07-09 NOTE — PROGRESS NOTES
PHYSICAL / OCCUPATIONAL THERAPY - DAILY TREATMENT NOTE    Patient Name: Maria Ines Das    Date: 2025    : 1960  Insurance: Payor: MEDICARE / Plan: MEDICARE PART A AND B / Product Type: *No Product type* /      Patient  verified Yes     Visit #   Current / Total 2 16   Time   In / Out 10:06 am 10:55 am   Pain   In / Out 2-L  3-R 2 L  2 R    Subjective Functional Status/Changes: Pt reports that her hips are feeling ok today.      TREATMENT AREA =  Trochanteric bursitis, unspecified hip  Pain in left hip  Pain in right hip    OBJECTIVE    Modalities Rationale:     decrease pain and increase muscle contraction/control to improve patient's ability to progress to PLOF and address remaining functional goals.     min [] Estim Unattended, type/location:                                      []  w/ice    []  w/heat   8 min [x] Estim Attended, type/location:  W/ Dry Needling                                   []  w/US     []  w/ice    []  w/heat    []  TENS insruct      min []  Mechanical Traction: type/lbs                   []  pro   []  sup   []  int   []  cont    []  before manual    []  after manual    min []  Ultrasound, settings/location:      min  unbill []  Ice     []  Heat    location/position:     min []  Paraffin,  details:     min []  Vasopneumatic Device, press/temp:     min []  Whirlpool / Fluido:    If using vaso (only need to measure limb vaso being performed on)      pre-treatment girth :       post-treatment girth :       measured at (landmark location) :      min []  Other:    Skin assessment post-treatment:   Intact      Therapeutic Procedures:    Tx Min Billable or 1:1 Min (if diff from Tx Min) Procedure, Rationale, Specifics   10  [id number] Therapeutic Activity (timed):  use of dynamic activities replicating functional movements to increase ROM, strength, coordination, balance, and proprioception in order to improve patient's ability to progress to PLOF and address remaining functional goals.

## 2025-07-10 DIAGNOSIS — F51.04 CHRONIC INSOMNIA: ICD-10-CM

## 2025-07-11 NOTE — TELEPHONE ENCOUNTER
PCP: Dandre Roman MD    Last appt: 04/14/2025  Last RX:05/01/2025    Future Appointments   Date Time Provider Department Center   7/17/2025 11:30 AM Elmira Reynolds PT [facility]   7/22/2025 12:50 PM Elmira Reynolds PT [facility]   7/25/2025  9:40 AM [facility] 3 3D [facility]   7/25/2025 11:30 AM Elmira Reynolds PT [facility]   7/29/2025  9:30 AM Elmira Reynolds PT [facility]   4/8/2026  8:00 AM [facility] LAB VISIT [facility]   4/15/2026  9:00 AM Dandre Roman MD [facility]       Requested Prescriptions     Pending Prescriptions Disp Refills    zolpidem (AMBIEN) 5 MG tablet [Pharmacy Med Name: Zolpidem Tartrate Oral Tablet 5 MG] 90 tablet      Sig: Take 1 tablet by mouth nightly as needed for Sleep for up to 90 days. Max Daily Amount: 5 mg

## 2025-07-14 RX ORDER — ZOLPIDEM TARTRATE 5 MG/1
5 TABLET ORAL NIGHTLY PRN
Qty: 90 TABLET | Refills: 0 | Status: SHIPPED | OUTPATIENT
Start: 2025-07-14 | End: 2025-10-12

## 2025-07-17 ENCOUNTER — HOSPITAL ENCOUNTER (OUTPATIENT)
Facility: HOSPITAL | Age: 65
Setting detail: RECURRING SERIES
Discharge: HOME | End: 2025-07-20
Payer: MEDICARE

## 2025-07-17 PROCEDURE — 97530 THERAPEUTIC ACTIVITIES: CPT

## 2025-07-17 PROCEDURE — 97110 THERAPEUTIC EXERCISES: CPT

## 2025-07-17 PROCEDURE — 20560 NDL INSJ W/O NJX 1 OR 2 MUSC: CPT

## 2025-07-17 PROCEDURE — 97032 APPL MODALITY 1+ESTIM EA 15: CPT

## 2025-07-17 NOTE — PROGRESS NOTES
PHYSICAL / OCCUPATIONAL THERAPY - DAILY TREATMENT NOTE    Patient Name: Mraia Ines Das    Date: 2025    : 1960  Insurance: Payor: MEDICARE / Plan: MEDICARE PART A AND B / Product Type: *No Product type* /      Patient  verified Yes     Visit #   Current / Total 3 16   Time   In / Out 11:30 am 12:24 pm   Pain   In / Out  3 2   Subjective Functional Status/Changes: Pt reports that she didn't feel much of anything from first session of DN.      TREATMENT AREA =  Trochanteric bursitis, unspecified hip  Pain in left hip  Pain in right hip    OBJECTIVE    Modalities Rationale:     decrease pain and increase muscle contraction/control to improve patient's ability to progress to PLOF and address remaining functional goals.     min [] Estim Unattended, type/location:                                      []  w/ice    []  w/heat   8 min [x] Estim Attended, type/location:  W/ Dry Needling                                   []  w/US     []  w/ice    []  w/heat    []  TENS insruct      min []  Mechanical Traction: type/lbs                   []  pro   []  sup   []  int   []  cont    []  before manual    []  after manual    min []  Ultrasound, settings/location:      min  unbill []  Ice     []  Heat    location/position:     min []  Paraffin,  details:     min []  Vasopneumatic Device, press/temp:     min []  Whirlpool / Fluido:    If using vaso (only need to measure limb vaso being performed on)      pre-treatment girth :       post-treatment girth :       measured at (landmark location) :      min []  Other:    Skin assessment post-treatment:   Intact      Therapeutic Procedures:    Tx Min Billable or 1:1 Min (if diff from Tx Min) Procedure, Rationale, Specifics   [id number] [phone] [id number] Therapeutic Activity (timed):  use of dynamic activities replicating functional movements to increase ROM, strength, coordination, balance, and proprioception in order to improve patient's ability to progress to PLOF and address remaining

## 2025-07-22 ENCOUNTER — HOSPITAL ENCOUNTER (OUTPATIENT)
Facility: HOSPITAL | Age: 65
Setting detail: RECURRING SERIES
Discharge: HOME | End: 2025-07-25
Payer: MEDICARE

## 2025-07-22 PROCEDURE — 97032 APPL MODALITY 1+ESTIM EA 15: CPT

## 2025-07-22 PROCEDURE — 20560 NDL INSJ W/O NJX 1 OR 2 MUSC: CPT

## 2025-07-22 PROCEDURE — 97110 THERAPEUTIC EXERCISES: CPT

## 2025-07-22 PROCEDURE — 97112 NEUROMUSCULAR REEDUCATION: CPT

## 2025-07-22 NOTE — PROGRESS NOTES
PHYSICAL / OCCUPATIONAL THERAPY - DAILY TREATMENT NOTE    Patient Name: Maria Ines Das    Date: 2025    : 1960  Insurance: Payor: MEDICARE / Plan: MEDICARE PART A AND B / Product Type: *No Product type* /      Patient  verified Yes     Visit #   Current / Total 4 16   Time   In / Out 12:50 pm 1:35 pm   Pain   In / Out  2 2   Subjective Functional Status/Changes: Pt reports that she didn't feel much of anything from first session of DN.      TREATMENT AREA =  Trochanteric bursitis, unspecified hip  Pain in left hip  Pain in right hip    OBJECTIVE    Modalities Rationale:     decrease pain and increase muscle contraction/control to improve patient's ability to progress to PLOF and address remaining functional goals.     min [] Estim Unattended, type/location:                                      []  w/ice    []  w/heat   8 min [x] Estim Attended, type/location:  W/ Dry Needling                                   []  w/US     []  w/ice    []  w/heat    []  TENS insruct      min []  Mechanical Traction: type/lbs                   []  pro   []  sup   []  int   []  cont    []  before manual    []  after manual    min []  Ultrasound, settings/location:      min  unbill []  Ice     []  Heat    location/position:     min []  Paraffin,  details:     min []  Vasopneumatic Device, press/temp:     min []  Whirlpool / Fluido:    If using vaso (only need to measure limb vaso being performed on)      pre-treatment girth :       post-treatment girth :       measured at (landmark location) :      min []  Other:    Skin assessment post-treatment:   Intact      Therapeutic Procedures:    Tx Min Billable or 1:1 Min (if diff from Tx Min) Procedure, Rationale, Specifics          Details if applicable:        19208 Therapeutic Exercise (timed):  increase ROM, strength, coordination, balance, and proprioception to improve patient's ability to progress to PLOF and address remaining functional goals. (see flow sheet as

## 2025-07-24 DIAGNOSIS — F32.A DEPRESSION, UNSPECIFIED DEPRESSION TYPE: Primary | ICD-10-CM

## 2025-07-25 ENCOUNTER — HOSPITAL ENCOUNTER (OUTPATIENT)
Facility: HOSPITAL | Age: 65
Setting detail: RECURRING SERIES
End: 2025-07-25
Payer: MEDICARE

## 2025-07-25 DIAGNOSIS — E78.5 DYSLIPIDEMIA: ICD-10-CM

## 2025-07-25 NOTE — TELEPHONE ENCOUNTER
Last Office Visit: 04/14/2025  Next Office Visit: 04/15/2026  Last Refill: 01/24/2025 (different pharmacy)

## 2025-07-28 RX ORDER — ESCITALOPRAM OXALATE 20 MG/1
20 TABLET ORAL DAILY
Qty: 90 TABLET | Refills: 3 | Status: SHIPPED | OUTPATIENT
Start: 2025-07-28

## 2025-07-28 RX ORDER — PRAVASTATIN SODIUM 10 MG
10 TABLET ORAL DAILY
Qty: 90 TABLET | Refills: 3 | Status: SHIPPED | OUTPATIENT
Start: 2025-07-28

## 2025-07-29 ENCOUNTER — HOSPITAL ENCOUNTER (OUTPATIENT)
Facility: HOSPITAL | Age: 65
Setting detail: RECURRING SERIES
Discharge: HOME | End: 2025-08-01
Payer: MEDICARE

## 2025-07-29 PROCEDURE — 20560 NDL INSJ W/O NJX 1 OR 2 MUSC: CPT

## 2025-07-29 PROCEDURE — 97032 APPL MODALITY 1+ESTIM EA 15: CPT

## 2025-07-29 PROCEDURE — 97112 NEUROMUSCULAR REEDUCATION: CPT

## 2025-07-29 PROCEDURE — 97110 THERAPEUTIC EXERCISES: CPT

## 2025-07-29 PROCEDURE — 97530 THERAPEUTIC ACTIVITIES: CPT

## 2025-07-29 NOTE — THERAPY RECERTIFICATION
[facility]  [address], [address], [place] [address]  Ph:[phone] Fx:[phone]  PHYSICAL THERAPY PROGRESS NOTE  Patient Name: Maria Ines Das : 1960   Medical/Treatment Diagnosis: Trochanteric bursitis, unspecified hip  Pain in left hip  Pain in right hip   Referral Source: Anthony Juan MD     Date of Initial Visit: 25 Attended Visits: 5 Missed Visits: 1     SUMMARY OF TREATMENT    Patient has only attended 5 total PT sessions and missed 1 since IE. Patient reports 20% improvement with PT and will continue to benefit from skilled PT services to modify and progress therapeutic interventions, analyze and address functional mobility deficits, analyze and address ROM deficits, analyze and address strength deficits, analyze and address soft tissue restrictions, analyze and cue for proper movement patterns, analyze and modify for postural abnormalities, and instruct in home and community integration to address functional deficits and attain remaining goals.    CURRENT STATUS    Patient has noted less quad pain and lateral hip and thigh radicular symptoms from dry needling and that allows her to be able climb the stairs with reciprocal gait pattern but continues to have bilateral hip pain with sitting > 20 mins, and standing or walking >15 mins.     Short Term Goals: To be accomplished in  3-4 weeks:  1. Independent with HEP.  EVAL: N/A  PN: pt reports compliance  25; goal met   2. Decrease max pain to <3/10 to assist with patients ability to walk for prolonged periods and ambulate stairs without significant pain.  EVAL: 8/10 max pain; unable to walk prolonged distances and ambulate stairs without increasing pain  PN: 4-5/10 pain with walking for prolonged periods of time but patient reports that she can do stairs with reciprocal gait pattern now   25; improving  3. Pt can roll and turn over in bed without R hip pain.  EVAL: R

## 2025-08-05 ENCOUNTER — HOSPITAL ENCOUNTER (OUTPATIENT)
Facility: HOSPITAL | Age: 65
Setting detail: RECURRING SERIES
Discharge: HOME | End: 2025-08-08
Payer: MEDICARE

## 2025-08-05 PROCEDURE — 20560 NDL INSJ W/O NJX 1 OR 2 MUSC: CPT

## 2025-08-05 PROCEDURE — 97032 APPL MODALITY 1+ESTIM EA 15: CPT

## 2025-08-05 PROCEDURE — 97110 THERAPEUTIC EXERCISES: CPT

## 2025-08-05 PROCEDURE — 97112 NEUROMUSCULAR REEDUCATION: CPT

## 2025-08-08 ENCOUNTER — TELEPHONE (OUTPATIENT)
Facility: HOSPITAL | Age: 65
End: 2025-08-08

## 2025-08-11 ENCOUNTER — APPOINTMENT (OUTPATIENT)
Facility: HOSPITAL | Age: 65
End: 2025-08-11
Payer: MEDICARE

## 2025-08-17 ENCOUNTER — TELEPHONE (OUTPATIENT)
Facility: HOSPITAL | Age: 65
End: 2025-08-17

## 2025-08-18 ENCOUNTER — APPOINTMENT (OUTPATIENT)
Facility: HOSPITAL | Age: 65
End: 2025-08-18
Payer: MEDICARE

## 2025-08-25 ENCOUNTER — HOSPITAL ENCOUNTER (OUTPATIENT)
Facility: HOSPITAL | Age: 65
Setting detail: RECURRING SERIES
Discharge: HOME | End: 2025-08-28
Payer: MEDICARE

## 2025-08-25 PROCEDURE — 97110 THERAPEUTIC EXERCISES: CPT

## 2025-08-25 PROCEDURE — 97112 NEUROMUSCULAR REEDUCATION: CPT

## 2025-08-25 PROCEDURE — 97530 THERAPEUTIC ACTIVITIES: CPT

## 2025-09-03 ENCOUNTER — HOSPITAL ENCOUNTER (OUTPATIENT)
Facility: HOSPITAL | Age: 65
Discharge: HOME | End: 2025-09-06
Payer: MEDICARE

## 2025-09-03 VITALS — BODY MASS INDEX: 28.07 KG/M2 | WEIGHT: 143 LBS | HEIGHT: 60 IN

## 2025-09-03 DIAGNOSIS — Z12.39 BREAST SCREENING: ICD-10-CM

## 2025-09-03 PROCEDURE — 77063 BREAST TOMOSYNTHESIS BI: CPT
